# Patient Record
Sex: FEMALE | Race: WHITE | Employment: UNEMPLOYED
[De-identification: names, ages, dates, MRNs, and addresses within clinical notes are randomized per-mention and may not be internally consistent; named-entity substitution may affect disease eponyms.]

---

## 2017-01-24 ENCOUNTER — OFFICE VISIT (OUTPATIENT)
Facility: CLINIC | Age: 62
End: 2017-01-24

## 2017-01-24 VITALS
HEIGHT: 62 IN | SYSTOLIC BLOOD PRESSURE: 130 MMHG | WEIGHT: 250 LBS | BODY MASS INDEX: 46.01 KG/M2 | DIASTOLIC BLOOD PRESSURE: 72 MMHG | TEMPERATURE: 97.8 F

## 2017-01-24 DIAGNOSIS — E11.41 TYPE 2 DIABETES MELLITUS WITH DIABETIC MONONEUROPATHY, UNSPECIFIED LONG TERM INSULIN USE STATUS: Primary | ICD-10-CM

## 2017-01-24 DIAGNOSIS — J02.9 PHARYNGITIS, UNSPECIFIED ETIOLOGY: ICD-10-CM

## 2017-01-24 DIAGNOSIS — Z23 NEED FOR INFLUENZA VACCINATION: ICD-10-CM

## 2017-01-24 DIAGNOSIS — E78.1 HYPERTRIGLYCERIDEMIA: Chronic | ICD-10-CM

## 2017-01-24 DIAGNOSIS — Z23 NEED FOR PNEUMOCOCCAL VACCINATION: ICD-10-CM

## 2017-01-24 LAB — HBA1C MFR BLD: 8.5 %

## 2017-01-24 PROCEDURE — 83036 HEMOGLOBIN GLYCOSYLATED A1C: CPT | Performed by: NURSE PRACTITIONER

## 2017-01-24 PROCEDURE — 90686 IIV4 VACC NO PRSV 0.5 ML IM: CPT | Performed by: NURSE PRACTITIONER

## 2017-01-24 PROCEDURE — 90472 IMMUNIZATION ADMIN EACH ADD: CPT | Performed by: NURSE PRACTITIONER

## 2017-01-24 PROCEDURE — G8427 DOCREV CUR MEDS BY ELIG CLIN: HCPCS | Performed by: NURSE PRACTITIONER

## 2017-01-24 PROCEDURE — 99214 OFFICE O/P EST MOD 30 MIN: CPT | Performed by: NURSE PRACTITIONER

## 2017-01-24 PROCEDURE — 3017F COLORECTAL CA SCREEN DOC REV: CPT | Performed by: NURSE PRACTITIONER

## 2017-01-24 PROCEDURE — 1036F TOBACCO NON-USER: CPT | Performed by: NURSE PRACTITIONER

## 2017-01-24 PROCEDURE — 3045F PR MOST RECENT HEMOGLOBIN A1C LEVEL 7.0-9.0%: CPT | Performed by: NURSE PRACTITIONER

## 2017-01-24 PROCEDURE — G8598 ASA/ANTIPLAT THER USED: HCPCS | Performed by: NURSE PRACTITIONER

## 2017-01-24 PROCEDURE — G8419 CALC BMI OUT NRM PARAM NOF/U: HCPCS | Performed by: NURSE PRACTITIONER

## 2017-01-24 PROCEDURE — G8484 FLU IMMUNIZE NO ADMIN: HCPCS | Performed by: NURSE PRACTITIONER

## 2017-01-24 PROCEDURE — 90732 PPSV23 VACC 2 YRS+ SUBQ/IM: CPT | Performed by: NURSE PRACTITIONER

## 2017-01-24 PROCEDURE — 90471 IMMUNIZATION ADMIN: CPT | Performed by: NURSE PRACTITIONER

## 2017-01-24 PROCEDURE — 3014F SCREEN MAMMO DOC REV: CPT | Performed by: NURSE PRACTITIONER

## 2017-01-24 RX ORDER — AMOXICILLIN 500 MG/1
500 CAPSULE ORAL 3 TIMES DAILY
Qty: 30 CAPSULE | Refills: 0 | Status: SHIPPED | OUTPATIENT
Start: 2017-01-24 | End: 2017-02-03

## 2017-01-24 ASSESSMENT — ENCOUNTER SYMPTOMS
SINUS PRESSURE: 0
SORE THROAT: 1
COUGH: 0
RHINORRHEA: 0

## 2017-02-09 ENCOUNTER — TELEPHONE (OUTPATIENT)
Facility: CLINIC | Age: 62
End: 2017-02-09

## 2017-02-09 RX ORDER — AMOXICILLIN 500 MG/1
500 CAPSULE ORAL 3 TIMES DAILY
Qty: 30 CAPSULE | Refills: 0 | Status: SHIPPED | OUTPATIENT
Start: 2017-02-09 | End: 2017-02-19

## 2017-03-01 ENCOUNTER — OFFICE VISIT (OUTPATIENT)
Facility: CLINIC | Age: 62
End: 2017-03-01

## 2017-03-01 VITALS
DIASTOLIC BLOOD PRESSURE: 78 MMHG | HEIGHT: 62 IN | OXYGEN SATURATION: 96 % | SYSTOLIC BLOOD PRESSURE: 134 MMHG | HEART RATE: 84 BPM | TEMPERATURE: 98 F | WEIGHT: 248 LBS | BODY MASS INDEX: 45.64 KG/M2

## 2017-03-01 DIAGNOSIS — R30.0 DYSURIA: ICD-10-CM

## 2017-03-01 DIAGNOSIS — J06.9 UPPER RESPIRATORY TRACT INFECTION, UNSPECIFIED TYPE: Primary | ICD-10-CM

## 2017-03-01 DIAGNOSIS — N30.00 ACUTE CYSTITIS WITHOUT HEMATURIA: ICD-10-CM

## 2017-03-01 LAB
APPEARANCE FLUID: NORMAL
BILIRUBIN, POC: NORMAL
BLOOD URINE, POC: NORMAL
CLARITY, POC: NORMAL
COLOR, POC: NORMAL
GLUCOSE URINE, POC: >=1000
KETONES, POC: NORMAL
LEUKOCYTE EST, POC: NORMAL
NITRITE, POC: NORMAL
PH, POC: 5
PROTEIN, POC: NORMAL
SPECIFIC GRAVITY, POC: <=1.005
UROBILINOGEN, POC: 0.2

## 2017-03-01 PROCEDURE — 1036F TOBACCO NON-USER: CPT | Performed by: NURSE PRACTITIONER

## 2017-03-01 PROCEDURE — 3014F SCREEN MAMMO DOC REV: CPT | Performed by: NURSE PRACTITIONER

## 2017-03-01 PROCEDURE — G8598 ASA/ANTIPLAT THER USED: HCPCS | Performed by: NURSE PRACTITIONER

## 2017-03-01 PROCEDURE — G8427 DOCREV CUR MEDS BY ELIG CLIN: HCPCS | Performed by: NURSE PRACTITIONER

## 2017-03-01 PROCEDURE — 3017F COLORECTAL CA SCREEN DOC REV: CPT | Performed by: NURSE PRACTITIONER

## 2017-03-01 PROCEDURE — 99214 OFFICE O/P EST MOD 30 MIN: CPT | Performed by: NURSE PRACTITIONER

## 2017-03-01 PROCEDURE — 81003 URINALYSIS AUTO W/O SCOPE: CPT | Performed by: NURSE PRACTITIONER

## 2017-03-01 PROCEDURE — G8417 CALC BMI ABV UP PARAM F/U: HCPCS | Performed by: NURSE PRACTITIONER

## 2017-03-01 PROCEDURE — G8484 FLU IMMUNIZE NO ADMIN: HCPCS | Performed by: NURSE PRACTITIONER

## 2017-03-01 RX ORDER — SULFAMETHOXAZOLE AND TRIMETHOPRIM 800; 160 MG/1; MG/1
1 TABLET ORAL 2 TIMES DAILY
Qty: 20 TABLET | Refills: 0 | Status: SHIPPED | OUTPATIENT
Start: 2017-03-01 | End: 2017-03-11

## 2017-03-01 ASSESSMENT — ENCOUNTER SYMPTOMS
RHINORRHEA: 0
COUGH: 1
SORE THROAT: 1
SINUS PRESSURE: 0

## 2017-04-29 DIAGNOSIS — I48.91 ATRIAL FIBRILLATION, UNSPECIFIED TYPE (HCC): ICD-10-CM

## 2017-05-01 RX ORDER — SOTALOL HYDROCHLORIDE 80 MG/1
TABLET ORAL
Qty: 180 TABLET | Refills: 1 | Status: SHIPPED | OUTPATIENT
Start: 2017-05-01 | End: 2017-05-02 | Stop reason: SDUPTHER

## 2017-11-06 ENCOUNTER — HOSPITAL ENCOUNTER (OUTPATIENT)
Age: 62
Setting detail: SPECIMEN
Discharge: HOME | End: 2017-11-06
Payer: COMMERCIAL

## 2017-11-06 DIAGNOSIS — Z00.00 HEALTHCARE MAINTENANCE: ICD-10-CM

## 2017-11-06 LAB
ALBUMIN SERPL-MCNC: 4.1 G/DL (ref 3.5–5.2)
ALBUMIN/GLOBULIN RATIO: 1.1 (ref 1–2.5)
ALP BLD-CCNC: 70 U/L (ref 35–104)
ALT SERPL-CCNC: 35 U/L (ref 5–33)
ANION GAP SERPL CALCULATED.3IONS-SCNC: 14 MMOL/L (ref 9–17)
ANION GAP SERPL CALCULATED.3IONS-SCNC: 14 MMOL/L (ref 9–17)
AST SERPL-CCNC: 38 U/L
BILIRUB SERPL-MCNC: 0.5 MG/DL (ref 0.3–1.2)
BUN BLDV-MCNC: 13 MG/DL (ref 8–23)
BUN BLDV-MCNC: 13 MG/DL (ref 8–23)
BUN/CREAT BLD: ABNORMAL (ref 9–20)
BUN/CREAT BLD: ABNORMAL (ref 9–20)
CALCIUM SERPL-MCNC: 9.3 MG/DL (ref 8.6–10.4)
CALCIUM SERPL-MCNC: 9.3 MG/DL (ref 8.6–10.4)
CHLORIDE BLD-SCNC: 97 MMOL/L (ref 98–107)
CHLORIDE BLD-SCNC: 97 MMOL/L (ref 98–107)
CHOLESTEROL/HDL RATIO: 4.3
CHOLESTEROL: 171 MG/DL
CO2: 28 MMOL/L (ref 20–31)
CO2: 28 MMOL/L (ref 20–31)
CREAT SERPL-MCNC: 0.64 MG/DL (ref 0.5–0.9)
CREAT SERPL-MCNC: 0.64 MG/DL (ref 0.5–0.9)
GFR AFRICAN AMERICAN: >60 ML/MIN
GFR AFRICAN AMERICAN: >60 ML/MIN
GFR NON-AFRICAN AMERICAN: >60 ML/MIN
GFR NON-AFRICAN AMERICAN: >60 ML/MIN
GFR SERPL CREATININE-BSD FRML MDRD: ABNORMAL ML/MIN/{1.73_M2}
GLUCOSE BLD-MCNC: 96 MG/DL (ref 70–99)
GLUCOSE BLD-MCNC: 96 MG/DL (ref 70–99)
HCT VFR BLD CALC: 54.1 % (ref 36.3–47.1)
HDLC SERPL-MCNC: 40 MG/DL
HEMOGLOBIN: 16.3 G/DL (ref 11.9–15.1)
LDL CHOLESTEROL: 93 MG/DL (ref 0–130)
MCH RBC QN AUTO: 25.9 PG (ref 25.2–33.5)
MCHC RBC AUTO-ENTMCNC: 30.1 G/DL (ref 28.4–34.8)
MCV RBC AUTO: 86 FL (ref 82.6–102.9)
PDW BLD-RTO: 16.7 % (ref 11.8–14.4)
PLATELET # BLD: ABNORMAL K/UL (ref 138–453)
PLATELET, FLUORESCENCE: 141 K/UL (ref 138–453)
PLATELET, IMMATURE FRACTION: 9 % (ref 1.1–10.3)
PMV BLD AUTO: ABNORMAL FL (ref 8.1–13.5)
POTASSIUM SERPL-SCNC: 4.4 MMOL/L (ref 3.7–5.3)
POTASSIUM SERPL-SCNC: 4.4 MMOL/L (ref 3.7–5.3)
RBC # BLD: 6.29 M/UL (ref 3.95–5.11)
SODIUM BLD-SCNC: 139 MMOL/L (ref 135–144)
SODIUM BLD-SCNC: 139 MMOL/L (ref 135–144)
TOTAL PROTEIN: 7.8 G/DL (ref 6.4–8.3)
TRIGL SERPL-MCNC: 192 MG/DL
VLDLC SERPL CALC-MCNC: ABNORMAL MG/DL (ref 1–30)
WBC # BLD: 9.3 K/UL (ref 3.5–11.3)

## 2018-01-05 DIAGNOSIS — J34.89 SINUS PRESSURE: ICD-10-CM

## 2018-01-05 RX ORDER — MOMETASONE FUROATE 50 UG/1
SPRAY, METERED NASAL
Qty: 1 EACH | Refills: 2 | Status: SHIPPED | OUTPATIENT
Start: 2018-01-05 | End: 2018-02-14 | Stop reason: SDUPTHER

## 2018-02-14 ENCOUNTER — HOSPITAL ENCOUNTER (OUTPATIENT)
Age: 63
Setting detail: SPECIMEN
Discharge: HOME | End: 2018-02-14
Payer: COMMERCIAL

## 2018-02-14 DIAGNOSIS — N30.01 ACUTE CYSTITIS WITH HEMATURIA: ICD-10-CM

## 2018-02-14 DIAGNOSIS — Z79.4 TYPE 2 DIABETES MELLITUS WITH DIABETIC MONONEUROPATHY, WITH LONG-TERM CURRENT USE OF INSULIN (HCC): ICD-10-CM

## 2018-02-14 DIAGNOSIS — E11.41 TYPE 2 DIABETES MELLITUS WITH DIABETIC MONONEUROPATHY, WITH LONG-TERM CURRENT USE OF INSULIN (HCC): ICD-10-CM

## 2018-02-14 LAB
CREATININE URINE: 95.8 MG/DL (ref 28–217)
MICROALBUMIN/CREAT 24H UR: 24 MG/L
MICROALBUMIN/CREAT UR-RTO: 25 MCG/MG CREAT

## 2018-02-16 LAB
CULTURE: ABNORMAL
CULTURE: ABNORMAL
Lab: ABNORMAL
ORGANISM: ABNORMAL
SPECIMEN DESCRIPTION: ABNORMAL
STATUS: ABNORMAL

## 2018-03-15 PROBLEM — K62.5 RECTAL BLEEDING: Status: ACTIVE | Noted: 2018-03-15

## 2018-03-17 ENCOUNTER — APPOINTMENT (OUTPATIENT)
Dept: CT IMAGING | Age: 63
End: 2018-03-17
Payer: COMMERCIAL

## 2018-03-17 ENCOUNTER — HOSPITAL ENCOUNTER (EMERGENCY)
Age: 63
Discharge: HOME | End: 2018-03-17
Attending: EMERGENCY MEDICINE
Payer: COMMERCIAL

## 2018-03-17 VITALS
WEIGHT: 247 LBS | RESPIRATION RATE: 18 BRPM | TEMPERATURE: 98.1 F | HEART RATE: 77 BPM | SYSTOLIC BLOOD PRESSURE: 134 MMHG | HEIGHT: 62 IN | BODY MASS INDEX: 45.45 KG/M2 | DIASTOLIC BLOOD PRESSURE: 55 MMHG | OXYGEN SATURATION: 93 %

## 2018-03-17 DIAGNOSIS — R10.11 ABDOMINAL PAIN, RIGHT UPPER QUADRANT: Primary | ICD-10-CM

## 2018-03-17 DIAGNOSIS — N30.00 ACUTE CYSTITIS WITHOUT HEMATURIA: ICD-10-CM

## 2018-03-17 LAB
-: ABNORMAL
ABSOLUTE EOS #: 0.2 K/UL (ref 0–0.4)
ABSOLUTE IMMATURE GRANULOCYTE: ABNORMAL K/UL (ref 0–0.3)
ABSOLUTE LYMPH #: 2.3 K/UL (ref 1–4.8)
ABSOLUTE MONO #: 0.5 K/UL (ref 0.1–1.3)
ALBUMIN SERPL-MCNC: 4.1 G/DL (ref 3.5–5.2)
ALBUMIN/GLOBULIN RATIO: NORMAL (ref 1–2.5)
ALP BLD-CCNC: 70 U/L (ref 35–104)
ALT SERPL-CCNC: 23 U/L (ref 5–33)
AMORPHOUS: ABNORMAL
ANION GAP SERPL CALCULATED.3IONS-SCNC: 10 MMOL/L (ref 9–17)
AST SERPL-CCNC: 19 U/L
BACTERIA: ABNORMAL
BASOPHILS # BLD: 1 % (ref 0–2)
BASOPHILS ABSOLUTE: 0.1 K/UL (ref 0–0.2)
BILIRUB SERPL-MCNC: 0.4 MG/DL (ref 0.3–1.2)
BILIRUBIN DIRECT: 0.12 MG/DL
BILIRUBIN URINE: NEGATIVE
BILIRUBIN, INDIRECT: 0.28 MG/DL (ref 0–1)
BUN BLDV-MCNC: 18 MG/DL (ref 8–23)
BUN/CREAT BLD: ABNORMAL (ref 9–20)
CALCIUM SERPL-MCNC: 9.4 MG/DL (ref 8.6–10.4)
CASTS UA: ABNORMAL /LPF
CHLORIDE BLD-SCNC: 102 MMOL/L (ref 98–107)
CO2: 28 MMOL/L (ref 20–31)
COLOR: YELLOW
COMMENT UA: ABNORMAL
CREAT SERPL-MCNC: 0.63 MG/DL (ref 0.5–0.9)
CRYSTALS, UA: ABNORMAL /HPF
DIFFERENTIAL TYPE: ABNORMAL
EOSINOPHILS RELATIVE PERCENT: 2 % (ref 0–4)
EPITHELIAL CELLS UA: ABNORMAL /HPF
GFR AFRICAN AMERICAN: >60 ML/MIN
GFR NON-AFRICAN AMERICAN: >60 ML/MIN
GFR SERPL CREATININE-BSD FRML MDRD: ABNORMAL ML/MIN/{1.73_M2}
GFR SERPL CREATININE-BSD FRML MDRD: ABNORMAL ML/MIN/{1.73_M2}
GLOBULIN: NORMAL G/DL (ref 1.5–3.8)
GLUCOSE BLD-MCNC: 131 MG/DL (ref 70–99)
GLUCOSE URINE: ABNORMAL
HCT VFR BLD CALC: 47.1 % (ref 36–46)
HEMOGLOBIN: 15.3 G/DL (ref 12–16)
IMMATURE GRANULOCYTES: ABNORMAL %
KETONES, URINE: NEGATIVE
LEUKOCYTE ESTERASE, URINE: ABNORMAL
LIPASE: 28 U/L (ref 13–60)
LYMPHOCYTES # BLD: 28 % (ref 24–44)
MCH RBC QN AUTO: 26.7 PG (ref 26–34)
MCHC RBC AUTO-ENTMCNC: 32.4 G/DL (ref 31–37)
MCV RBC AUTO: 82.2 FL (ref 80–100)
MONOCYTES # BLD: 6 % (ref 1–7)
MUCUS: ABNORMAL
NITRITE, URINE: NEGATIVE
NRBC AUTOMATED: ABNORMAL PER 100 WBC
OTHER OBSERVATIONS UA: ABNORMAL
PDW BLD-RTO: 17.1 % (ref 11.5–14.9)
PH UA: 6 (ref 5–8)
PLATELET # BLD: 144 K/UL (ref 150–450)
PLATELET ESTIMATE: ABNORMAL
PMV BLD AUTO: 9.4 FL (ref 6–12)
POTASSIUM SERPL-SCNC: 4.1 MMOL/L (ref 3.7–5.3)
PROTEIN UA: NEGATIVE
RBC # BLD: 5.73 M/UL (ref 4–5.2)
RBC # BLD: ABNORMAL 10*6/UL
RBC UA: ABNORMAL /HPF
RENAL EPITHELIAL, UA: ABNORMAL /HPF
SEG NEUTROPHILS: 63 % (ref 36–66)
SEGMENTED NEUTROPHILS ABSOLUTE COUNT: 5.4 K/UL (ref 1.3–9.1)
SODIUM BLD-SCNC: 140 MMOL/L (ref 135–144)
SPECIFIC GRAVITY UA: 1.03 (ref 1–1.03)
TOTAL PROTEIN: 7.8 G/DL (ref 6.4–8.3)
TRICHOMONAS: ABNORMAL
TURBIDITY: ABNORMAL
URINE HGB: NEGATIVE
UROBILINOGEN, URINE: NORMAL
WBC # BLD: 8.5 K/UL (ref 3.5–11)
WBC # BLD: ABNORMAL 10*3/UL
WBC UA: ABNORMAL /HPF
YEAST: ABNORMAL

## 2018-03-17 PROCEDURE — 96374 THER/PROPH/DIAG INJ IV PUSH: CPT

## 2018-03-17 PROCEDURE — 81001 URINALYSIS AUTO W/SCOPE: CPT

## 2018-03-17 PROCEDURE — 99284 EMERGENCY DEPT VISIT MOD MDM: CPT

## 2018-03-17 PROCEDURE — 83690 ASSAY OF LIPASE: CPT

## 2018-03-17 PROCEDURE — 74177 CT ABD & PELVIS W/CONTRAST: CPT

## 2018-03-17 PROCEDURE — 87086 URINE CULTURE/COLONY COUNT: CPT

## 2018-03-17 PROCEDURE — 6360000004 HC RX CONTRAST MEDICATION: Performed by: EMERGENCY MEDICINE

## 2018-03-17 PROCEDURE — 6360000002 HC RX W HCPCS: Performed by: EMERGENCY MEDICINE

## 2018-03-17 PROCEDURE — 80076 HEPATIC FUNCTION PANEL: CPT

## 2018-03-17 PROCEDURE — 6370000000 HC RX 637 (ALT 250 FOR IP): Performed by: EMERGENCY MEDICINE

## 2018-03-17 PROCEDURE — 80048 BASIC METABOLIC PNL TOTAL CA: CPT

## 2018-03-17 PROCEDURE — 87077 CULTURE AEROBIC IDENTIFY: CPT

## 2018-03-17 PROCEDURE — 36415 COLL VENOUS BLD VENIPUNCTURE: CPT

## 2018-03-17 PROCEDURE — 85025 COMPLETE CBC W/AUTO DIFF WBC: CPT

## 2018-03-17 PROCEDURE — 87186 SC STD MICRODIL/AGAR DIL: CPT

## 2018-03-17 PROCEDURE — 2580000003 HC RX 258: Performed by: EMERGENCY MEDICINE

## 2018-03-17 RX ORDER — MORPHINE SULFATE 2 MG/ML
4 INJECTION, SOLUTION INTRAMUSCULAR; INTRAVENOUS ONCE
Status: COMPLETED | OUTPATIENT
Start: 2018-03-17 | End: 2018-03-17

## 2018-03-17 RX ORDER — 0.9 % SODIUM CHLORIDE 0.9 %
100 INTRAVENOUS SOLUTION INTRAVENOUS ONCE
Status: COMPLETED | OUTPATIENT
Start: 2018-03-17 | End: 2018-03-17

## 2018-03-17 RX ORDER — SODIUM CHLORIDE 0.9 % (FLUSH) 0.9 %
10 SYRINGE (ML) INJECTION PRN
Status: DISCONTINUED | OUTPATIENT
Start: 2018-03-17 | End: 2018-03-18 | Stop reason: HOSPADM

## 2018-03-17 RX ORDER — CEPHALEXIN 250 MG/1
500 CAPSULE ORAL ONCE
Status: COMPLETED | OUTPATIENT
Start: 2018-03-17 | End: 2018-03-17

## 2018-03-17 RX ORDER — CEPHALEXIN 500 MG/1
500 CAPSULE ORAL 4 TIMES DAILY
Qty: 28 CAPSULE | Refills: 0 | Status: SHIPPED | OUTPATIENT
Start: 2018-03-17 | End: 2018-03-24

## 2018-03-17 RX ADMIN — Medication 10 ML: at 20:49

## 2018-03-17 RX ADMIN — SODIUM CHLORIDE 100 ML: 9 INJECTION, SOLUTION INTRAVENOUS at 20:49

## 2018-03-17 RX ADMIN — CEPHALEXIN 500 MG: 250 CAPSULE ORAL at 22:01

## 2018-03-17 RX ADMIN — IOPAMIDOL 100 ML: 755 INJECTION, SOLUTION INTRAVENOUS at 20:49

## 2018-03-17 RX ADMIN — MORPHINE SULFATE 4 MG: 2 INJECTION, SOLUTION INTRAMUSCULAR; INTRAVENOUS at 19:59

## 2018-03-17 ASSESSMENT — PAIN DESCRIPTION - DESCRIPTORS
DESCRIPTORS: ACHING;CONSTANT
DESCRIPTORS: ACHING

## 2018-03-17 ASSESSMENT — ENCOUNTER SYMPTOMS
BLOOD IN STOOL: 1
EYES NEGATIVE: 1
RESPIRATORY NEGATIVE: 1
ABDOMINAL PAIN: 1
ALLERGIC/IMMUNOLOGIC NEGATIVE: 1

## 2018-03-17 ASSESSMENT — PAIN DESCRIPTION - ORIENTATION: ORIENTATION: RIGHT

## 2018-03-17 ASSESSMENT — PAIN DESCRIPTION - FREQUENCY: FREQUENCY: CONTINUOUS

## 2018-03-17 ASSESSMENT — PAIN SCALES - GENERAL
PAINLEVEL_OUTOF10: 9
PAINLEVEL_OUTOF10: 9
PAINLEVEL_OUTOF10: 7

## 2018-03-17 ASSESSMENT — PAIN DESCRIPTION - PAIN TYPE
TYPE: ACUTE PAIN
TYPE: ACUTE PAIN

## 2018-03-17 ASSESSMENT — PAIN DESCRIPTION - LOCATION
LOCATION: ABDOMEN
LOCATION: ABDOMEN

## 2018-03-17 ASSESSMENT — PAIN DESCRIPTION - PROGRESSION: CLINICAL_PROGRESSION: GRADUALLY IMPROVING

## 2018-03-17 NOTE — ED PROVIDER NOTES
apnea; Tubular adenoma; and Type II or unspecified type diabetes mellitus without mention of complication, not stated as uncontrolled. has a past surgical history that includes Pacemaker insertion; Appendectomy;  section; bladder suspension; Abdominal adhesion surgery; Cholecystectomy; Achilles tendon surgery; Hysterectomy; Upper gastrointestinal endoscopy (7 23 15); ERCP (7-24-15); Cardiac defibrillator placement; Cataract removal with implant (Bilateral); Knee arthroscopy (Left); Upper gastrointestinal endoscopy (7/30/15); skin biopsy; Endoscopy, colon, diagnostic; Colonoscopy; and Colonoscopy (3/16/16). Social History     Social History    Marital status:      Spouse name: N/A    Number of children: N/A    Years of education: N/A     Occupational History    Not on file. Social History Main Topics    Smoking status: Never Smoker    Smokeless tobacco: Never Used    Alcohol use No    Drug use: No    Sexual activity: Not on file     Other Topics Concern    Not on file     Social History Narrative    No narrative on file       Family History   Problem Relation Age of Onset    Cancer Father     Cancer Maternal Uncle     Cancer Paternal Uncle     Cancer Paternal Uncle      lung    Cancer Paternal Uncle      pancreas    Cancer Paternal Uncle      pancreas    Cancer Paternal Uncle      pancreas    Cancer Paternal Uncle     Cancer Maternal Uncle     Cancer Maternal Uncle     Heart Disease Mother     Heart Disease Maternal Grandmother     Heart Disease Maternal Grandfather     Heart Disease Paternal Grandmother        Allergies:  Demerol hcl [meperidine] and Lipitor [atorvastatin]    Home Medications:  Prior to Admission medications    Medication Sig Start Date End Date Taking?  Authorizing Provider   cephALEXin (KEFLEX) 500 MG capsule Take 1 capsule by mouth 4 times daily for 7 days 3/17/18 3/24/18 Yes Rosana Abdullahi MD   Empagliflozin-Linagliptin (GLYXAMBI) 25-5 MG ischemia, inflammatory bowel disease, spontaneous bacterial peritonitis    DIAGNOSTIC RESULTS / EMERGENCY DEPARTMENT COURSE / MDM     LABS:  Results for orders placed or performed during the hospital encounter of 03/17/18   CBC Auto Differential   Result Value Ref Range    WBC 8.5 3.5 - 11.0 k/uL    RBC 5.73 (H) 4.0 - 5.2 m/uL    Hemoglobin 15.3 12.0 - 16.0 g/dL    Hematocrit 47.1 (H) 36 - 46 %    MCV 82.2 80 - 100 fL    MCH 26.7 26 - 34 pg    MCHC 32.4 31 - 37 g/dL    RDW 17.1 (H) 11.5 - 14.9 %    Platelets 244 (L) 697 - 450 k/uL    MPV 9.4 6.0 - 12.0 fL    NRBC Automated NOT REPORTED per 100 WBC    Differential Type NOT REPORTED     Seg Neutrophils 63 36 - 66 %    Lymphocytes 28 24 - 44 %    Monocytes 6 1 - 7 %    Eosinophils % 2 0 - 4 %    Basophils 1 0 - 2 %    Immature Granulocytes NOT REPORTED 0 %    Segs Absolute 5.40 1.3 - 9.1 k/uL    Absolute Lymph # 2.30 1.0 - 4.8 k/uL    Absolute Mono # 0.50 0.1 - 1.3 k/uL    Absolute Eos # 0.20 0.0 - 0.4 k/uL    Basophils # 0.10 0.0 - 0.2 k/uL    Absolute Immature Granulocyte NOT REPORTED 0.00 - 0.30 k/uL    WBC Morphology NOT REPORTED     RBC Morphology NOT REPORTED     Platelet Estimate NOT REPORTED    Basic Metabolic Prof   Result Value Ref Range    Glucose 131 (H) 70 - 99 mg/dL    BUN 18 8 - 23 mg/dL    CREATININE 0.63 0.50 - 0.90 mg/dL    Bun/Cre Ratio NOT REPORTED 9 - 20    Calcium 9.4 8.6 - 10.4 mg/dL    Sodium 140 135 - 144 mmol/L    Potassium 4.1 3.7 - 5.3 mmol/L    Chloride 102 98 - 107 mmol/L    CO2 28 20 - 31 mmol/L    Anion Gap 10 9 - 17 mmol/L    GFR Non-African American >60 >60 mL/min    GFR African American >60 >60 mL/min    GFR Comment          GFR Staging NOT REPORTED    Hepatic Function Panel   Result Value Ref Range    Alb 4.1 3.5 - 5.2 g/dL    Alkaline Phosphatase 70 35 - 104 U/L    ALT 23 5 - 33 U/L    AST 19 <32 U/L    Total Bilirubin 0.40 0.3 - 1.2 mg/dL    Bilirubin, Direct 0.12 <0.31 mg/dL    Bilirubin, Indirect 0.28 0.00 - 1.00 mg/dL    Total NP  [address] [facility]  [address] [facility],[address]  [address] [facility] [address]  [phone]    Call in 1 day      [facility] [place] [facility]  [place] [address]  [phone]  Go to   As needed, If symptoms worsen    MD Donovan [address] [place] [address]  [address] [facility] [address] [phone]    Call   For Follow up appointment      DISCHARGE MEDICATIONS:  Discharge Medication List as of 3/17/2018  9:32 PM      START taking these medications    Details   cephALEXin (KEFLEX) 500 MG capsule Take 1 capsule by mouth 4 times daily for 7 days, Disp-28 capsule, R-0Print             Harjeet Mena MD  Emergency Medicine Resident    (Please note that portions of this note were completed with a voice recognition program.  Efforts were made to edit the dictations but occasionally words are mis-transcribed.)       Harjeet Mena MD  Resident  03/18/18 1517

## 2018-03-19 ENCOUNTER — OFFICE VISIT (OUTPATIENT)
Dept: GASTROENTEROLOGY | Age: 63
End: 2018-03-19
Payer: COMMERCIAL

## 2018-03-19 VITALS
HEART RATE: 81 BPM | SYSTOLIC BLOOD PRESSURE: 138 MMHG | HEIGHT: 62 IN | BODY MASS INDEX: 46.19 KG/M2 | WEIGHT: 251 LBS | OXYGEN SATURATION: 92 % | DIASTOLIC BLOOD PRESSURE: 84 MMHG | RESPIRATION RATE: 14 BRPM

## 2018-03-19 DIAGNOSIS — R10.11 RIGHT UPPER QUADRANT ABDOMINAL PAIN: ICD-10-CM

## 2018-03-19 DIAGNOSIS — D36.9 TUBULAR ADENOMA: ICD-10-CM

## 2018-03-19 DIAGNOSIS — K21.9 GASTROESOPHAGEAL REFLUX DISEASE WITHOUT ESOPHAGITIS: Primary | ICD-10-CM

## 2018-03-19 DIAGNOSIS — K62.5 RECTAL BLEEDING: ICD-10-CM

## 2018-03-19 LAB
CULTURE: ABNORMAL
CULTURE: ABNORMAL
Lab: ABNORMAL
ORGANISM: ABNORMAL
SPECIMEN DESCRIPTION: ABNORMAL
SPECIMEN DESCRIPTION: ABNORMAL
STATUS: ABNORMAL

## 2018-03-19 PROCEDURE — G8427 DOCREV CUR MEDS BY ELIG CLIN: HCPCS | Performed by: INTERNAL MEDICINE

## 2018-03-19 PROCEDURE — 3014F SCREEN MAMMO DOC REV: CPT | Performed by: INTERNAL MEDICINE

## 2018-03-19 PROCEDURE — 3017F COLORECTAL CA SCREEN DOC REV: CPT | Performed by: INTERNAL MEDICINE

## 2018-03-19 PROCEDURE — 1036F TOBACCO NON-USER: CPT | Performed by: INTERNAL MEDICINE

## 2018-03-19 PROCEDURE — G8598 ASA/ANTIPLAT THER USED: HCPCS | Performed by: INTERNAL MEDICINE

## 2018-03-19 PROCEDURE — G8484 FLU IMMUNIZE NO ADMIN: HCPCS | Performed by: INTERNAL MEDICINE

## 2018-03-19 PROCEDURE — 99214 OFFICE O/P EST MOD 30 MIN: CPT | Performed by: INTERNAL MEDICINE

## 2018-03-19 PROCEDURE — G8417 CALC BMI ABV UP PARAM F/U: HCPCS | Performed by: INTERNAL MEDICINE

## 2018-03-19 RX ORDER — SODIUM, POTASSIUM,MAG SULFATES 17.5-3.13G
SOLUTION, RECONSTITUTED, ORAL ORAL
Qty: 1 BOTTLE | Refills: 0 | Status: SHIPPED | OUTPATIENT
Start: 2018-03-19 | End: 2018-05-03

## 2018-03-19 RX ORDER — DICYCLOMINE HYDROCHLORIDE 10 MG/1
10 CAPSULE ORAL 4 TIMES DAILY
Qty: 120 CAPSULE | Refills: 3 | Status: SHIPPED | OUTPATIENT
Start: 2018-03-19 | End: 2018-08-06 | Stop reason: SDUPTHER

## 2018-03-19 ASSESSMENT — ENCOUNTER SYMPTOMS
ABDOMINAL DISTENTION: 1
RECTAL PAIN: 0
ABDOMINAL PAIN: 1
BLOOD IN STOOL: 1
ANAL BLEEDING: 1
DIARRHEA: 0
CONSTIPATION: 0
RESPIRATORY NEGATIVE: 1
VOMITING: 0
EYES NEGATIVE: 1
ALLERGIC/IMMUNOLOGIC NEGATIVE: 1
NAUSEA: 0

## 2018-04-04 ENCOUNTER — HOSPITAL ENCOUNTER (OUTPATIENT)
Age: 63
Setting detail: OUTPATIENT SURGERY
Discharge: HOME | End: 2018-04-04
Attending: INTERNAL MEDICINE | Admitting: INTERNAL MEDICINE
Payer: COMMERCIAL

## 2018-04-04 VITALS
BODY MASS INDEX: 45.45 KG/M2 | HEIGHT: 62 IN | TEMPERATURE: 97.9 F | HEART RATE: 66 BPM | DIASTOLIC BLOOD PRESSURE: 77 MMHG | OXYGEN SATURATION: 99 % | WEIGHT: 247 LBS | RESPIRATION RATE: 16 BRPM | SYSTOLIC BLOOD PRESSURE: 133 MMHG

## 2018-04-04 LAB — GLUCOSE BLD-MCNC: 107 MG/DL (ref 65–105)

## 2018-04-04 PROCEDURE — 88305 TISSUE EXAM BY PATHOLOGIST: CPT

## 2018-04-04 PROCEDURE — 82947 ASSAY GLUCOSE BLOOD QUANT: CPT

## 2018-04-04 PROCEDURE — 45385 COLONOSCOPY W/LESION REMOVAL: CPT | Performed by: INTERNAL MEDICINE

## 2018-04-04 PROCEDURE — 99153 MOD SED SAME PHYS/QHP EA: CPT | Performed by: INTERNAL MEDICINE

## 2018-04-04 PROCEDURE — 3609010600 HC COLONOSCOPY POLYPECTOMY SNARE/COLD BIOPSY: Performed by: INTERNAL MEDICINE

## 2018-04-04 PROCEDURE — 7100000010 HC PHASE II RECOVERY - FIRST 15 MIN: Performed by: INTERNAL MEDICINE

## 2018-04-04 PROCEDURE — 99152 MOD SED SAME PHYS/QHP 5/>YRS: CPT | Performed by: INTERNAL MEDICINE

## 2018-04-04 PROCEDURE — 2580000003 HC RX 258: Performed by: INTERNAL MEDICINE

## 2018-04-04 PROCEDURE — 6360000002 HC RX W HCPCS: Performed by: INTERNAL MEDICINE

## 2018-04-04 PROCEDURE — C1773 RET DEV, INSERTABLE: HCPCS | Performed by: INTERNAL MEDICINE

## 2018-04-04 PROCEDURE — 7100000011 HC PHASE II RECOVERY - ADDTL 15 MIN: Performed by: INTERNAL MEDICINE

## 2018-04-04 RX ORDER — FENTANYL CITRATE 50 UG/ML
INJECTION, SOLUTION INTRAMUSCULAR; INTRAVENOUS PRN
Status: DISCONTINUED | OUTPATIENT
Start: 2018-04-04 | End: 2018-04-04 | Stop reason: HOSPADM

## 2018-04-04 RX ORDER — MIDAZOLAM HYDROCHLORIDE 1 MG/ML
INJECTION INTRAMUSCULAR; INTRAVENOUS PRN
Status: DISCONTINUED | OUTPATIENT
Start: 2018-04-04 | End: 2018-04-04 | Stop reason: HOSPADM

## 2018-04-04 RX ORDER — SODIUM CHLORIDE, SODIUM LACTATE, POTASSIUM CHLORIDE, CALCIUM CHLORIDE 600; 310; 30; 20 MG/100ML; MG/100ML; MG/100ML; MG/100ML
INJECTION, SOLUTION INTRAVENOUS CONTINUOUS
Status: DISCONTINUED | OUTPATIENT
Start: 2018-04-04 | End: 2018-04-04 | Stop reason: HOSPADM

## 2018-04-04 RX ADMIN — SODIUM CHLORIDE, POTASSIUM CHLORIDE, SODIUM LACTATE AND CALCIUM CHLORIDE: 600; 310; 30; 20 INJECTION, SOLUTION INTRAVENOUS at 09:14

## 2018-04-04 ASSESSMENT — PAIN - FUNCTIONAL ASSESSMENT: PAIN_FUNCTIONAL_ASSESSMENT: 0-10

## 2018-04-04 ASSESSMENT — PAIN SCALES - GENERAL: PAINLEVEL_OUTOF10: 0

## 2018-04-05 LAB — SURGICAL PATHOLOGY REPORT: NORMAL

## 2018-05-03 ENCOUNTER — OFFICE VISIT (OUTPATIENT)
Dept: GASTROENTEROLOGY | Age: 63
End: 2018-05-03
Payer: COMMERCIAL

## 2018-05-03 VITALS
WEIGHT: 254 LBS | SYSTOLIC BLOOD PRESSURE: 135 MMHG | BODY MASS INDEX: 46.74 KG/M2 | HEART RATE: 78 BPM | HEIGHT: 62 IN | OXYGEN SATURATION: 95 % | RESPIRATION RATE: 14 BRPM | DIASTOLIC BLOOD PRESSURE: 78 MMHG

## 2018-05-03 DIAGNOSIS — D36.9 TUBULAR ADENOMA: Primary | ICD-10-CM

## 2018-05-03 DIAGNOSIS — K21.9 GASTROESOPHAGEAL REFLUX DISEASE WITHOUT ESOPHAGITIS: ICD-10-CM

## 2018-05-03 PROCEDURE — 1036F TOBACCO NON-USER: CPT | Performed by: INTERNAL MEDICINE

## 2018-05-03 PROCEDURE — 3017F COLORECTAL CA SCREEN DOC REV: CPT | Performed by: INTERNAL MEDICINE

## 2018-05-03 PROCEDURE — 99214 OFFICE O/P EST MOD 30 MIN: CPT | Performed by: INTERNAL MEDICINE

## 2018-05-03 PROCEDURE — G8598 ASA/ANTIPLAT THER USED: HCPCS | Performed by: INTERNAL MEDICINE

## 2018-05-03 PROCEDURE — G8417 CALC BMI ABV UP PARAM F/U: HCPCS | Performed by: INTERNAL MEDICINE

## 2018-05-03 PROCEDURE — G8427 DOCREV CUR MEDS BY ELIG CLIN: HCPCS | Performed by: INTERNAL MEDICINE

## 2018-05-03 ASSESSMENT — ENCOUNTER SYMPTOMS
EYES NEGATIVE: 1
ANAL BLEEDING: 0
ABDOMINAL PAIN: 0
RESPIRATORY NEGATIVE: 1
VOMITING: 0
RECTAL PAIN: 0
BLOOD IN STOOL: 0
GASTROINTESTINAL NEGATIVE: 1
NAUSEA: 0
DIARRHEA: 0
CONSTIPATION: 0
ALLERGIC/IMMUNOLOGIC NEGATIVE: 1
ABDOMINAL DISTENTION: 0

## 2018-05-30 DIAGNOSIS — J34.89 SINUS PRESSURE: ICD-10-CM

## 2018-05-30 RX ORDER — MOMETASONE FUROATE 50 UG/1
SPRAY, METERED NASAL
Qty: 1 INHALER | Refills: 2 | Status: SHIPPED | OUTPATIENT
Start: 2018-05-30 | End: 2018-08-29 | Stop reason: SDUPTHER

## 2018-05-31 ENCOUNTER — HOSPITAL ENCOUNTER (OUTPATIENT)
Facility: CLINIC | Age: 63
Discharge: HOME | End: 2018-06-02
Payer: COMMERCIAL

## 2018-05-31 ENCOUNTER — HOSPITAL ENCOUNTER (OUTPATIENT)
Dept: GENERAL RADIOLOGY | Facility: CLINIC | Age: 63
Discharge: HOME | End: 2018-06-02
Payer: COMMERCIAL

## 2018-05-31 DIAGNOSIS — R05.9 COUGH: ICD-10-CM

## 2018-05-31 PROCEDURE — 71046 X-RAY EXAM CHEST 2 VIEWS: CPT

## 2018-08-08 RX ORDER — DICYCLOMINE HYDROCHLORIDE 10 MG/1
10 CAPSULE ORAL 4 TIMES DAILY
Qty: 120 CAPSULE | Refills: 0 | Status: SHIPPED | OUTPATIENT
Start: 2018-08-08 | End: 2018-11-29

## 2018-11-29 ENCOUNTER — HOSPITAL ENCOUNTER (OUTPATIENT)
Age: 63
Setting detail: SPECIMEN
Discharge: HOME | End: 2018-11-29
Payer: COMMERCIAL

## 2018-11-29 ENCOUNTER — HOSPITAL ENCOUNTER (OUTPATIENT)
Facility: CLINIC | Age: 63
Discharge: HOME | End: 2018-12-01
Payer: COMMERCIAL

## 2018-11-29 ENCOUNTER — HOSPITAL ENCOUNTER (OUTPATIENT)
Dept: GENERAL RADIOLOGY | Facility: CLINIC | Age: 63
Discharge: HOME | End: 2018-12-01
Payer: COMMERCIAL

## 2018-11-29 DIAGNOSIS — M79.601 RIGHT ARM PAIN: ICD-10-CM

## 2018-11-29 DIAGNOSIS — R10.11 RUQ PAIN: ICD-10-CM

## 2018-11-29 DIAGNOSIS — R20.2 NUMBNESS AND TINGLING OF RIGHT ARM: ICD-10-CM

## 2018-11-29 DIAGNOSIS — R20.0 NUMBNESS AND TINGLING OF RIGHT ARM: ICD-10-CM

## 2018-11-29 LAB
AMYLASE: 41 U/L (ref 28–100)
LIPASE: 30 U/L (ref 13–60)

## 2018-11-29 PROCEDURE — 73080 X-RAY EXAM OF ELBOW: CPT

## 2018-11-29 PROCEDURE — 72040 X-RAY EXAM NECK SPINE 2-3 VW: CPT

## 2018-12-10 ENCOUNTER — HOSPITAL ENCOUNTER (OUTPATIENT)
Age: 63
Setting detail: SPECIMEN
Discharge: HOME | End: 2018-12-10
Payer: COMMERCIAL

## 2018-12-10 DIAGNOSIS — E78.1 HYPERTRIGLYCERIDEMIA: Chronic | ICD-10-CM

## 2018-12-10 LAB
ALBUMIN SERPL-MCNC: 4 G/DL (ref 3.5–5.2)
ALBUMIN/GLOBULIN RATIO: 1.3 (ref 1–2.5)
ALP BLD-CCNC: 71 U/L (ref 35–104)
ALT SERPL-CCNC: 20 U/L (ref 5–33)
ANION GAP SERPL CALCULATED.3IONS-SCNC: 12 MMOL/L (ref 9–17)
AST SERPL-CCNC: 19 U/L
BILIRUB SERPL-MCNC: 0.36 MG/DL (ref 0.3–1.2)
BUN BLDV-MCNC: 21 MG/DL (ref 8–23)
BUN/CREAT BLD: NORMAL (ref 9–20)
CALCIUM SERPL-MCNC: 9.5 MG/DL (ref 8.6–10.4)
CHLORIDE BLD-SCNC: 100 MMOL/L (ref 98–107)
CHOLESTEROL/HDL RATIO: 3
CHOLESTEROL: 170 MG/DL
CO2: 31 MMOL/L (ref 20–31)
CREAT SERPL-MCNC: 0.62 MG/DL (ref 0.5–0.9)
GFR AFRICAN AMERICAN: >60 ML/MIN
GFR NON-AFRICAN AMERICAN: >60 ML/MIN
GFR SERPL CREATININE-BSD FRML MDRD: NORMAL ML/MIN/{1.73_M2}
GFR SERPL CREATININE-BSD FRML MDRD: NORMAL ML/MIN/{1.73_M2}
GLUCOSE BLD-MCNC: 93 MG/DL (ref 70–99)
HCT VFR BLD CALC: 49.8 % (ref 36.3–47.1)
HDLC SERPL-MCNC: 56 MG/DL
HEMOGLOBIN: 14.8 G/DL (ref 11.9–15.1)
LDL CHOLESTEROL: 73 MG/DL (ref 0–130)
MCH RBC QN AUTO: 26.9 PG (ref 25.2–33.5)
MCHC RBC AUTO-ENTMCNC: 29.7 G/DL (ref 28.4–34.8)
MCV RBC AUTO: 90.4 FL (ref 82.6–102.9)
NRBC AUTOMATED: 0 PER 100 WBC
PDW BLD-RTO: 16.1 % (ref 11.8–14.4)
PLATELET # BLD: 170 K/UL (ref 138–453)
PMV BLD AUTO: 12.4 FL (ref 8.1–13.5)
POTASSIUM SERPL-SCNC: 5 MMOL/L (ref 3.7–5.3)
RBC # BLD: 5.51 M/UL (ref 3.95–5.11)
SODIUM BLD-SCNC: 143 MMOL/L (ref 135–144)
TOTAL PROTEIN: 7.2 G/DL (ref 6.4–8.3)
TRIGL SERPL-MCNC: 203 MG/DL
VLDLC SERPL CALC-MCNC: ABNORMAL MG/DL (ref 1–30)
WBC # BLD: 8.8 K/UL (ref 3.5–11.3)

## 2018-12-14 ENCOUNTER — CLINICAL DOCUMENTATION (OUTPATIENT)
Dept: PHYSICAL MEDICINE AND REHAB | Age: 63
End: 2018-12-14

## 2019-01-29 ENCOUNTER — HOSPITAL ENCOUNTER (OUTPATIENT)
Dept: NEUROLOGY | Age: 64
Discharge: HOME | End: 2019-01-29
Payer: COMMERCIAL

## 2019-01-29 PROCEDURE — 95908 NRV CNDJ TST 3-4 STUDIES: CPT | Performed by: PHYSICAL MEDICINE & REHABILITATION

## 2019-01-29 PROCEDURE — 95886 MUSC TEST DONE W/N TEST COMP: CPT | Performed by: PHYSICAL MEDICINE & REHABILITATION

## 2019-01-30 DIAGNOSIS — R20.2 NUMBNESS AND TINGLING OF RIGHT ARM: ICD-10-CM

## 2019-01-30 DIAGNOSIS — M79.601 RIGHT ARM PAIN: ICD-10-CM

## 2019-01-30 DIAGNOSIS — R20.0 NUMBNESS AND TINGLING OF RIGHT ARM: ICD-10-CM

## 2019-04-11 ENCOUNTER — OFFICE VISIT (OUTPATIENT)
Dept: ORTHOPEDIC SURGERY | Age: 64
End: 2019-04-11
Payer: COMMERCIAL

## 2019-04-11 VITALS — HEIGHT: 63 IN | BODY MASS INDEX: 45.36 KG/M2 | RESPIRATION RATE: 16 BRPM | OXYGEN SATURATION: 100 %

## 2019-04-11 DIAGNOSIS — G56.01 RIGHT CARPAL TUNNEL SYNDROME: Primary | ICD-10-CM

## 2019-04-11 PROCEDURE — G8598 ASA/ANTIPLAT THER USED: HCPCS | Performed by: ORTHOPAEDIC SURGERY

## 2019-04-11 PROCEDURE — 1036F TOBACCO NON-USER: CPT | Performed by: ORTHOPAEDIC SURGERY

## 2019-04-11 PROCEDURE — G8417 CALC BMI ABV UP PARAM F/U: HCPCS | Performed by: ORTHOPAEDIC SURGERY

## 2019-04-11 PROCEDURE — 99203 OFFICE O/P NEW LOW 30 MIN: CPT | Performed by: ORTHOPAEDIC SURGERY

## 2019-04-11 PROCEDURE — G8427 DOCREV CUR MEDS BY ELIG CLIN: HCPCS | Performed by: ORTHOPAEDIC SURGERY

## 2019-04-11 PROCEDURE — 3017F COLORECTAL CA SCREEN DOC REV: CPT | Performed by: ORTHOPAEDIC SURGERY

## 2019-04-11 NOTE — PROGRESS NOTES
Mariya Hudson M.D.            [address] [place] [address], [address] [facility]               Liana Luna             Dept Phone: [phone]             Dept Fax:  [phone] is a 59-year-old right-hand out of patient seen for right carpal tunnel symptoms. Patient states she's had symptoms for years. She has tried nocturnal splints to no avail. She did have an EMG and NCV which shows that she has severe carpal tunnel syndrome on the right. Examination patient right wrist notes no thenar atrophy. She has ongoing Phalen's. She has a positive Tinel's. She has slightly decreased  strength. Interosseous strength is good no Froment's no other contributory findings    The an EMG and NCV revealed severe carpal tunnel syndrome on the right    Impression  Severe carpal tunnel syndrome right    Plan  Patient has failed nocturnal splints. She wishes to proceed surgically. We discussed the risks and benefits. We'll see her back here postop        XR taken today:  No results found. Review of Systems   Constitutional: Negative. HENT: Negative. Respiratory: Negative. Cardiovascular: Negative. Neurological: Negative.     Musculoskeletal:   Carpal Tunnel          Current Outpatient Medications:     TRESIBA FLEXTOUCH 200 UNIT/ML SOPN, INJECT 65 UNITS INTO THE SKIN DAILY, Disp: 27 mL, Rfl: 3    SITagliptin (JANUVIA) 100 MG tablet, Take 1 tablet by mouth daily, Disp: 90 tablet, Rfl: 1    dapagliflozin (FARXIGA) 10 MG tablet, Take 1 tablet by mouth every morning, Disp: 90 tablet, Rfl: 1    omega-3 acid ethyl esters (LOVAZA) 1 g capsule, Take 2 capsules by mouth 2 times daily, Disp: 360 capsule, Rfl: 1    albuterol (PROVENTIL) (2.5 MG/3ML) 0.083% nebulizer solution, Take 3 mLs by nebulization every 6 hours as needed for Wheezing, Disp: 120 each, Rfl: 3    albuterol sulfate HFA (PROAIR HFA) 108 (90 Base) MCG/ACT inhaler, Inhale 2 puffs into the lungs every 6 hours as needed for Wheezing, Disp: 1 Inhaler, Rfl: 3    sotalol (BETAPACE) 80 MG tablet, TAKE 1 TABLET BY MOUTH 2 TIMES DAILY, Disp: 180 tablet, Rfl: 1    pantoprazole (PROTONIX) 40 MG tablet, Take 1 tablet by mouth daily, Disp: 90 tablet, Rfl: 1    amLODIPine (NORVASC) 5 MG tablet, TAKE 1 TABLET BY MOUTH EVERY DAY, Disp: 90 tablet, Rfl: 1    mometasone (NASONEX) 50 MCG/ACT nasal spray, SPRAY TWO SPRAYS IN EACH NOSTRIL ONCE DAILY, Disp: 1 Inhaler, Rfl: 2    pioglitazone (ACTOS) 30 MG tablet, Take 1 tablet by mouth daily, Disp: 90 tablet, Rfl: 1    budesonide-formoterol (SYMBICORT) 160-4.5 MCG/ACT AERO, TAKE 2 PUFFS BY MOUTH TWICE A DAY, Disp: 10.2 Inhaler, Rfl: 5    Semaglutide (OZEMPIC) 0.25 or 0.5 MG/DOSE SOPN, Inject 0.5 mg into the skin once a week, Disp: 2 pen, Rfl: 5    valsartan (DIOVAN) 320 MG tablet, TAKE 1 TABLET BY MOUTH DAILY, Disp: 90 tablet, Rfl: 1    predniSONE (DELTASONE) 10 MG tablet, 3 tabs for 3 days, 2 tabs for 3 days, 1 tab for 3 days, Disp: 18 tablet, Rfl: 0    Insulin Pen Needle 32G X 4 MM MISC, 1 each by Does not apply route daily, Disp: 90 each, Rfl: 3    aspirin 325 MG tablet, Take 325 mg by mouth every other day DUe 12/12/15, Disp: , Rfl:     Allergies   Allergen Reactions    Demerol Hcl [Meperidine]     Lipitor [Atorvastatin]      General aching  General aching    Metformin And Related        Social History     Socioeconomic History    Marital status:      Spouse name: Not on file    Number of children: Not on file    Years of education: Not on file    Highest education level: Not on file   Occupational History    Not on file   Social Needs    Financial resource strain: Not on file    Food insecurity:     Worry: Not on file     Inability: Not on file    Transportation needs:     Medical: Not on file     Non-medical: Not on file   Tobacco Use    Smoking status: Never Smoker    Smokeless tobacco: Never Used   Substance and Sexual Activity    Alcohol use: No    Drug use: No    Sexual activity: Not on file   Lifestyle    Physical activity:     Days per week: Not on file     Minutes per session: Not on file    Stress: Not on file   Relationships    Social connections:     Talks on phone: Not on file     Gets together: Not on file     Attends Adventism service: Not on file     Active member of club or organization: Not on file     Attends meetings of clubs or organizations: Not on file     Relationship status: Not on file    Intimate partner violence:     Fear of current or ex partner: Not on file     Emotionally abused: Not on file     Physically abused: Not on file     Forced sexual activity: Not on file   Other Topics Concern    Not on file   Social History Narrative    Not on file       Past Medical History:   Diagnosis Date    Asthma     Atrial fibrillation ([facility]     had albation    CAD (coronary artery disease)     Cancer ([facility]     skin    GERD (gastroesophageal reflux disease)     Hypertension     Morbid obesity ([facility]     Sleep apnea     uses cpap    Tubular adenoma     Type II or unspecified type diabetes mellitus without mention of complication, not stated as uncontrolled      Past Surgical History:   Procedure Laterality Date    ABDOMINAL ADHESION SURGERY      ACHILLES TENDON SURGERY      APPENDECTOMY      BLADDER SUSPENSION      CARDIAC DEFIBRILLATOR PLACEMENT      CARDIAC DEFIBRILLATOR PLACEMENT      CARDIAC DEFIBRILLATOR PLACEMENT      CATARACT REMOVAL WITH IMPLANT Bilateral      SECTION      CHOLECYSTECTOMY      COLONOSCOPY      x3    COLONOSCOPY  3/16/16    Poor prep , not optimal for patient with polyps, repeat colonoscopy in 1- 2 years , tubular adenoma     COLONOSCOPY N/A 2018    COLONOSCOPY POLYPECTOMY SNARE performed by López Lee MD at [address] [place] [address], COLON, DIAGNOSTIC      ERCP  7-24-15    biliary sphincterotomy, pacreatic duct stent placement    HYSTERECTOMY      KNEE ARTHROSCOPY Left     PACEMAKER INSERTION      AICD    SKIN BIOPSY      nose    UPPER GASTROINTESTINAL ENDOSCOPY  7 23 15    bx    UPPER GASTROINTESTINAL ENDOSCOPY  7/30/15     Family History   Problem Relation Age of Onset    Cancer Father     Cancer Maternal Uncle     Cancer Paternal Uncle         colon    Cancer Paternal Uncle         lung    Cancer Paternal Uncle         pancreas    Cancer Paternal Uncle         pancreas    Cancer Paternal Uncle         pancreas    Cancer Paternal Uncle     Cancer Maternal Uncle     Cancer Maternal Uncle     Heart Disease Mother     Heart Disease Maternal Grandmother     Heart Disease Maternal Grandfather     Heart Disease Paternal Grandmother          No orders of the defined types were placed in this encounter. 1. Right carpal tunnel syndrome            Sherri Guerra MD    Please note that this chart was generated using voice recognition Dragon dictation software. Although every effort was made to ensure the accuracy of this automated transcription, some errors in transcription may have occurred.

## 2019-04-18 ENCOUNTER — HOSPITAL ENCOUNTER (OUTPATIENT)
Dept: PREADMISSION TESTING | Age: 64
Discharge: HOME | End: 2019-04-22
Payer: COMMERCIAL

## 2019-04-18 VITALS
TEMPERATURE: 98.1 F | HEIGHT: 63 IN | HEART RATE: 69 BPM | OXYGEN SATURATION: 99 % | DIASTOLIC BLOOD PRESSURE: 73 MMHG | RESPIRATION RATE: 16 BRPM | SYSTOLIC BLOOD PRESSURE: 158 MMHG | WEIGHT: 270 LBS | BODY MASS INDEX: 47.84 KG/M2

## 2019-04-18 LAB
ABSOLUTE EOS #: 0.1 K/UL (ref 0–0.4)
ABSOLUTE IMMATURE GRANULOCYTE: ABNORMAL K/UL (ref 0–0.3)
ABSOLUTE LYMPH #: 1.8 K/UL (ref 1–4.8)
ABSOLUTE MONO #: 0.3 K/UL (ref 0.1–1.3)
ANION GAP SERPL CALCULATED.3IONS-SCNC: 10 MMOL/L (ref 9–17)
BASOPHILS # BLD: 1 % (ref 0–2)
BASOPHILS ABSOLUTE: 0 K/UL (ref 0–0.2)
BUN BLDV-MCNC: 16 MG/DL (ref 8–23)
BUN/CREAT BLD: ABNORMAL (ref 9–20)
CALCIUM SERPL-MCNC: 9.1 MG/DL (ref 8.6–10.4)
CHLORIDE BLD-SCNC: 102 MMOL/L (ref 98–107)
CO2: 29 MMOL/L (ref 20–31)
CREAT SERPL-MCNC: 0.91 MG/DL (ref 0.5–0.9)
DIFFERENTIAL TYPE: ABNORMAL
EOSINOPHILS RELATIVE PERCENT: 3 % (ref 0–4)
GFR AFRICAN AMERICAN: >60 ML/MIN
GFR NON-AFRICAN AMERICAN: >60 ML/MIN
GFR SERPL CREATININE-BSD FRML MDRD: ABNORMAL ML/MIN/{1.73_M2}
GFR SERPL CREATININE-BSD FRML MDRD: ABNORMAL ML/MIN/{1.73_M2}
GLUCOSE BLD-MCNC: 123 MG/DL (ref 70–99)
HCT VFR BLD CALC: 43.9 % (ref 36–46)
HEMOGLOBIN: 14.2 G/DL (ref 12–16)
IMMATURE GRANULOCYTES: ABNORMAL %
LYMPHOCYTES # BLD: 35 % (ref 24–44)
MCH RBC QN AUTO: 27.3 PG (ref 26–34)
MCHC RBC AUTO-ENTMCNC: 32.3 G/DL (ref 31–37)
MCV RBC AUTO: 84.5 FL (ref 80–100)
MONOCYTES # BLD: 6 % (ref 1–7)
NRBC AUTOMATED: ABNORMAL PER 100 WBC
PDW BLD-RTO: 16.4 % (ref 11.5–14.9)
PLATELET # BLD: 154 K/UL (ref 150–450)
PLATELET ESTIMATE: ABNORMAL
PMV BLD AUTO: 9.9 FL (ref 6–12)
POTASSIUM SERPL-SCNC: 4.3 MMOL/L (ref 3.7–5.3)
RBC # BLD: 5.2 M/UL (ref 4–5.2)
RBC # BLD: ABNORMAL 10*6/UL
SEG NEUTROPHILS: 55 % (ref 36–66)
SEGMENTED NEUTROPHILS ABSOLUTE COUNT: 3 K/UL (ref 1.3–9.1)
SODIUM BLD-SCNC: 141 MMOL/L (ref 135–144)
WBC # BLD: 5.3 K/UL (ref 3.5–11)
WBC # BLD: ABNORMAL 10*3/UL

## 2019-04-18 PROCEDURE — 80048 BASIC METABOLIC PNL TOTAL CA: CPT

## 2019-04-18 PROCEDURE — 93005 ELECTROCARDIOGRAM TRACING: CPT

## 2019-04-18 PROCEDURE — 85025 COMPLETE CBC W/AUTO DIFF WBC: CPT

## 2019-04-18 PROCEDURE — 36415 COLL VENOUS BLD VENIPUNCTURE: CPT

## 2019-04-18 ASSESSMENT — PAIN SCALES - GENERAL: PAINLEVEL_OUTOF10: 7

## 2019-04-18 ASSESSMENT — PAIN DESCRIPTION - ORIENTATION: ORIENTATION: RIGHT

## 2019-04-18 ASSESSMENT — PAIN DESCRIPTION - LOCATION: LOCATION: ARM;WRIST

## 2019-04-18 ASSESSMENT — PAIN DESCRIPTION - PAIN TYPE: TYPE: CHRONIC PAIN

## 2019-04-18 NOTE — H&P (VIEW-ONLY)
COLONOSCOPY      x3    COLONOSCOPY  3/16/16    Poor prep , not optimal for patient with polyps, repeat colonoscopy in 1- 2 years , tubular adenoma     COLONOSCOPY N/A 4/4/2018    COLONOSCOPY POLYPECTOMY SNARE performed by Brigid Giordano MD at [address] [place] [address] ERCP  07/24/2015    biliary sphincterotomy, pacreatic duct stent placement, it has been removed    EYE SURGERY Bilateral     cataract    HYSTERECTOMY      KNEE ARTHROSCOPY Left     PACEMAKER PLACEMENT  2012    AICD, had ablation first    SKIN BIOPSY      nose    UPPER GASTROINTESTINAL ENDOSCOPY  7 23 15    bx       FAMILY HISTORY       Family History   Problem Relation Age of Onset    Cancer Father     Cancer Maternal Uncle     Cancer Paternal Uncle         colon    Cancer Paternal Uncle         lung    Cancer Paternal Uncle         pancreas    Cancer Paternal Uncle         pancreas    Cancer Paternal Uncle         pancreas    Cancer Paternal Uncle     Cancer Maternal Uncle     Cancer Maternal Uncle     Heart Disease Mother     Heart Disease Maternal Grandmother     Heart Disease Maternal Grandfather     Heart Disease Paternal Grandmother        SOCIAL HISTORY       Social History     Socioeconomic History    Marital status:      Spouse name: None    Number of children: None    Years of education: None    Highest education level: None   Occupational History    None   Social Needs    Financial resource strain: None    Food insecurity:     Worry: None     Inability: None    Transportation needs:     Medical: None     Non-medical: None   Tobacco Use    Smoking status: Never Smoker    Smokeless tobacco: Never Used   Substance and Sexual Activity    Alcohol use: No    Drug use: No    Sexual activity: None   Lifestyle    Physical activity:     Days per week: None     Minutes per session: None    Stress: None   Relationships    Social connections:     Talks on phone: None     Gets together: None     Attends Gnosticism service: None Active member of club or organization: None     Attends meetings of clubs or organizations: None     Relationship status: None    Intimate partner violence:     Fear of current or ex partner: None     Emotionally abused: None     Physically abused: None     Forced sexual activity: None   Other Topics Concern    None   Social History Narrative    None           REVIEW OF SYSTEMS      Allergies   Allergen Reactions    Demerol Hcl [Meperidine]     Lipitor [Atorvastatin]      General aching  General aching    Metformin And Related        Current Outpatient Medications on File Prior to Encounter   Medication Sig Dispense Refill    TRESIBA FLEXTOUCH 200 UNIT/ML SOPN INJECT 65 UNITS INTO THE SKIN DAILY 27 mL 3    albuterol (PROVENTIL) (2.5 MG/3ML) 0.083% nebulizer solution Take 3 mLs by nebulization every 6 hours as needed for Wheezing 120 each 3    albuterol sulfate HFA (PROAIR HFA) 108 (90 Base) MCG/ACT inhaler Inhale 2 puffs into the lungs every 6 hours as needed for Wheezing 1 Inhaler 3    sotalol (BETAPACE) 80 MG tablet TAKE 1 TABLET BY MOUTH 2 TIMES DAILY 180 tablet 1    pantoprazole (PROTONIX) 40 MG tablet Take 1 tablet by mouth daily 90 tablet 1    amLODIPine (NORVASC) 5 MG tablet TAKE 1 TABLET BY MOUTH EVERY DAY 90 tablet 1    pioglitazone (ACTOS) 30 MG tablet Take 1 tablet by mouth daily 90 tablet 1    budesonide-formoterol (SYMBICORT) 160-4.5 MCG/ACT AERO TAKE 2 PUFFS BY MOUTH TWICE A DAY 10.2 Inhaler 5    Semaglutide (OZEMPIC) 0.25 or 0.5 MG/DOSE SOPN Inject 0.5 mg into the skin once a week 2 pen 5    valsartan (DIOVAN) 320 MG tablet TAKE 1 TABLET BY MOUTH DAILY 90 tablet 1    aspirin 325 MG tablet Take 325 mg by mouth every other day DUe 12/12/15      Insulin Pen Needle 32G X 4 MM MISC 1 each by Does not apply route daily 90 each 3     No current facility-administered medications on file prior to encounter. General health:  Fairly good. No fever or chills.                  Skin: No itching, redness or rash. HEENT:  No headache, epistaxis or sore throat. Neck:  No pain, stiffness or masses. Cardiovascular/Respiratory system:  Dyspnea on exertion, hx asthma. No chest pain, palpitation. Gastrointestinal tract: No abdominal pain, nausea, vomiting, diarrhea or constipation. Genitourinary:  No burning on micturition. No hesitancy, urgency, frequency or discoloration of urine. Locomotor:  See HPI. Neuropsychiatric:  No referable complaints. GENERAL PHYSICAL EXAM:     Vitals: BP (!) 158/73   Pulse 69   Temp 98.1 °F (36.7 °C) (Oral)   Resp 16   Ht 5' 2.5\" (1.588 m)   Wt 270 lb (122.5 kg)   SpO2 99%   BMI 48.60 kg/m²  Body mass index is 48.6 kg/m². GENERAL APPEARANCE:   Levar Lazo is 58 y.o.,  female, severely obese, nourished, conscious, alert. Does not appear to be distress or pain at this time. SKIN:  Warm, dry, no cyanosis or jaundice. HEAD:  Normocephalic, atraumatic, no swelling or tenderness. EYES:  Pupils equal, reactive to light. EARS:  No discharge, no marked hearing loss. NOSE:  No rhinorrhea, epistaxis or septal deformity. THROAT:  Not congested. No ulceration bleeding or discharge. NECK:  No stiffness, trachea central.                 CHEST:  AICD to chest. Symmetrical and equal on expansion. HEART:  Hx atrial fib, currently RRR S1 > S2. No audible murmurs or gallops. LUNGS:  Equal on expansion, normal breath sounds. No adventitious sounds. ABDOMEN:  Obese. Soft on palpation. No localized tenderness. No guarding or rigidity. LYMPHATICS:  No palpable cervical lymphadenopathy. LOCOMOTOR, BACK AND SPINE:  No tenderness or deformities.                  EXTREMITIES: +Tinel's on right, + Phalen's on right.  strength weaker on right as compared to left. Symmetrical, no pedal edema. No calf tenderness. No discoloration or ulcerations. NEUROLOGIC:  The patient is conscious, alert, oriented, No apparent focal sensory or motor deficits.                                                                                    PROVISIONAL DIAGNOSES / SURGERY:      Right Carpal Tunnel Syndrome  Right Carpal Tunnel Release     FLORIDALMA Wylie - CNP on 4/18/2019 at 9:03 AM

## 2019-04-18 NOTE — H&P
HISTORY and [facility] [address]       NAME:  Aren Kaur  MRN: [id number]   YOB: 1955   Date: 2019   Age: 58 y.o. Gender: female       COMPLAINT AND PRESENT HISTORY:     Aren Kaur is 58 y.o. , female, Preadmission Testing for right carpal tunnel release. Pt states she has been dealing with this for over 1 year. She is right handed. The pain is described as aching, throbbing and at times burning. Pain radiates to the right elbow laterally. She was wearing a nocturnal brace, but states this did not improve the pain. Pain 5/10. Also reports numbness and tingling to the right 1st, 2nd and 3rd fingers. She has weakened  strength on right as compared to the left. She had EMG which showed carpal tunnel on right. She denies any other symptoms today. She has history of atrial fibrillation, has pacemaker/defibrillator that was last interrogated 2019. She denies any cardiac related symptoms on exam. Sees cardiology next month. PAST MEDICAL HISTORY     Past Medical History:   Diagnosis Date    Anesthesia     severe breathing issues(stopped breathing) after EGD    Asthma     Atrial fibrillation ([address]     had ablation and pacemaker/defibrillator.      CAD (coronary artery disease)     pt denies    Cancer ([address]     skin on face    GERD (gastroesophageal reflux disease)     Hypertension     Morbid obesity ([address]     Prolonged emergence from general anesthesia     Seasonal allergies     Sleep apnea     uses cpap    Tubular adenoma     Type II or unspecified type diabetes mellitus without mention of complication, not stated as uncontrolled      SURGICAL HISTORY       Past Surgical History:   Procedure Laterality Date    ABDOMEN SURGERY      abdominal adhesion surgery on bowel    ACHILLES TENDON SURGERY Right     APPENDECTOMY      BLADDER SUSPENSION      CARDIAC DEFIBRILLATOR PLACEMENT      AICD     SECTION      CHOLECYSTECTOMY      COLONOSCOPY      x3    COLONOSCOPY  3/16/16    Poor prep , not optimal for patient with polyps, repeat colonoscopy in 1- 2 years , tubular adenoma     COLONOSCOPY N/A 4/4/2018    COLONOSCOPY POLYPECTOMY SNARE performed by Kingsley Garcia MD at [address] [place] [address] ERCP  07/24/2015    biliary sphincterotomy, pacreatic duct stent placement, it has been removed    EYE SURGERY Bilateral     cataract    HYSTERECTOMY      KNEE ARTHROSCOPY Left     PACEMAKER PLACEMENT  2012    AICD, had ablation first    SKIN BIOPSY      nose    UPPER GASTROINTESTINAL ENDOSCOPY  7 23 15    bx       FAMILY HISTORY       Family History   Problem Relation Age of Onset    Cancer Father     Cancer Maternal Uncle     Cancer Paternal Uncle         colon    Cancer Paternal Uncle         lung    Cancer Paternal Uncle         pancreas    Cancer Paternal Uncle         pancreas    Cancer Paternal Uncle         pancreas    Cancer Paternal Uncle     Cancer Maternal Uncle     Cancer Maternal Uncle     Heart Disease Mother     Heart Disease Maternal Grandmother     Heart Disease Maternal Grandfather     Heart Disease Paternal Grandmother        SOCIAL HISTORY       Social History     Socioeconomic History    Marital status:      Spouse name: None    Number of children: None    Years of education: None    Highest education level: None   Occupational History    None   Social Needs    Financial resource strain: None    Food insecurity:     Worry: None     Inability: None    Transportation needs:     Medical: None     Non-medical: None   Tobacco Use    Smoking status: Never Smoker    Smokeless tobacco: Never Used   Substance and Sexual Activity    Alcohol use: No    Drug use: No    Sexual activity: None   Lifestyle    Physical activity:     Days per week: None     Minutes per session: None    Stress: None   Relationships    Social connections:     Talks on phone: None     Gets together: None     Attends Yazidi service: None Active member of club or organization: None     Attends meetings of clubs or organizations: None     Relationship status: None    Intimate partner violence:     Fear of current or ex partner: None     Emotionally abused: None     Physically abused: None     Forced sexual activity: None   Other Topics Concern    None   Social History Narrative    None           REVIEW OF SYSTEMS      Allergies   Allergen Reactions    Demerol Hcl [Meperidine]     Lipitor [Atorvastatin]      General aching  General aching    Metformin And Related        Current Outpatient Medications on File Prior to Encounter   Medication Sig Dispense Refill    TRESIBA FLEXTOUCH 200 UNIT/ML SOPN INJECT 65 UNITS INTO THE SKIN DAILY 27 mL 3    albuterol (PROVENTIL) (2.5 MG/3ML) 0.083% nebulizer solution Take 3 mLs by nebulization every 6 hours as needed for Wheezing 120 each 3    albuterol sulfate HFA (PROAIR HFA) 108 (90 Base) MCG/ACT inhaler Inhale 2 puffs into the lungs every 6 hours as needed for Wheezing 1 Inhaler 3    sotalol (BETAPACE) 80 MG tablet TAKE 1 TABLET BY MOUTH 2 TIMES DAILY 180 tablet 1    pantoprazole (PROTONIX) 40 MG tablet Take 1 tablet by mouth daily 90 tablet 1    amLODIPine (NORVASC) 5 MG tablet TAKE 1 TABLET BY MOUTH EVERY DAY 90 tablet 1    pioglitazone (ACTOS) 30 MG tablet Take 1 tablet by mouth daily 90 tablet 1    budesonide-formoterol (SYMBICORT) 160-4.5 MCG/ACT AERO TAKE 2 PUFFS BY MOUTH TWICE A DAY 10.2 Inhaler 5    Semaglutide (OZEMPIC) 0.25 or 0.5 MG/DOSE SOPN Inject 0.5 mg into the skin once a week 2 pen 5    valsartan (DIOVAN) 320 MG tablet TAKE 1 TABLET BY MOUTH DAILY 90 tablet 1    aspirin 325 MG tablet Take 325 mg by mouth every other day DUe 12/12/15      Insulin Pen Needle 32G X 4 MM MISC 1 each by Does not apply route daily 90 each 3     No current facility-administered medications on file prior to encounter. General health:  Fairly good. No fever or chills.                  Skin: +Tinel's on right, + Phalen's on right.  strength weaker on right as compared to left. Symmetrical, no pedal edema. No calf tenderness. No discoloration or ulcerations. NEUROLOGIC:  The patient is conscious, alert, oriented, No apparent focal sensory or motor deficits.                                                                                    PROVISIONAL DIAGNOSES / SURGERY:      Right Carpal Tunnel Syndrome  Right Carpal Tunnel Release     FLORIDALMA Lopez - CNP on 4/18/2019 at 9:03 AM

## 2019-04-19 ENCOUNTER — ANESTHESIA EVENT (OUTPATIENT)
Dept: OPERATING ROOM | Age: 64
End: 2019-04-19
Payer: COMMERCIAL

## 2019-04-19 RX ORDER — SODIUM CHLORIDE 0.9 % (FLUSH) 0.9 %
10 SYRINGE (ML) INJECTION PRN
Status: CANCELLED | OUTPATIENT
Start: 2019-04-19

## 2019-04-19 RX ORDER — SODIUM CHLORIDE 0.9 % (FLUSH) 0.9 %
10 SYRINGE (ML) INJECTION EVERY 12 HOURS SCHEDULED
Status: CANCELLED | OUTPATIENT
Start: 2019-04-19

## 2019-04-19 RX ORDER — LIDOCAINE HYDROCHLORIDE 10 MG/ML
1 INJECTION, SOLUTION EPIDURAL; INFILTRATION; INTRACAUDAL; PERINEURAL
Status: CANCELLED | OUTPATIENT
Start: 2019-04-19 | End: 2019-04-19

## 2019-04-19 RX ORDER — SODIUM CHLORIDE 9 MG/ML
INJECTION, SOLUTION INTRAVENOUS CONTINUOUS
Status: CANCELLED | OUTPATIENT
Start: 2019-04-19

## 2019-05-02 ENCOUNTER — HOSPITAL ENCOUNTER (OUTPATIENT)
Age: 64
Setting detail: OUTPATIENT SURGERY
Discharge: HOME | End: 2019-05-02
Attending: ORTHOPAEDIC SURGERY | Admitting: ORTHOPAEDIC SURGERY
Payer: COMMERCIAL

## 2019-05-02 ENCOUNTER — ANESTHESIA (OUTPATIENT)
Dept: OPERATING ROOM | Age: 64
End: 2019-05-02
Payer: COMMERCIAL

## 2019-05-02 VITALS
OXYGEN SATURATION: 94 % | SYSTOLIC BLOOD PRESSURE: 126 MMHG | TEMPERATURE: 97 F | BODY MASS INDEX: 47.84 KG/M2 | DIASTOLIC BLOOD PRESSURE: 74 MMHG | RESPIRATION RATE: 13 BRPM | WEIGHT: 270 LBS | HEIGHT: 63 IN | HEART RATE: 69 BPM

## 2019-05-02 VITALS
DIASTOLIC BLOOD PRESSURE: 67 MMHG | SYSTOLIC BLOOD PRESSURE: 136 MMHG | RESPIRATION RATE: 17 BRPM | OXYGEN SATURATION: 93 %

## 2019-05-02 DIAGNOSIS — G56.01 CARPAL TUNNEL SYNDROME, RIGHT: Primary | ICD-10-CM

## 2019-05-02 LAB
GLUCOSE BLD-MCNC: 83 MG/DL (ref 65–105)
GLUCOSE BLD-MCNC: 92 MG/DL (ref 65–105)

## 2019-05-02 PROCEDURE — 6360000002 HC RX W HCPCS: Performed by: NURSE ANESTHETIST, CERTIFIED REGISTERED

## 2019-05-02 PROCEDURE — 7100000001 HC PACU RECOVERY - ADDTL 15 MIN: Performed by: ORTHOPAEDIC SURGERY

## 2019-05-02 PROCEDURE — 2580000003 HC RX 258: Performed by: ANESTHESIOLOGY

## 2019-05-02 PROCEDURE — 3700000000 HC ANESTHESIA ATTENDED CARE: Performed by: ORTHOPAEDIC SURGERY

## 2019-05-02 PROCEDURE — 7100000000 HC PACU RECOVERY - FIRST 15 MIN: Performed by: ORTHOPAEDIC SURGERY

## 2019-05-02 PROCEDURE — 7100000031 HC ASPR PHASE II RECOVERY - ADDTL 15 MIN: Performed by: ORTHOPAEDIC SURGERY

## 2019-05-02 PROCEDURE — 7100000030 HC ASPR PHASE II RECOVERY - FIRST 15 MIN: Performed by: ORTHOPAEDIC SURGERY

## 2019-05-02 PROCEDURE — 3600000002 HC SURGERY LEVEL 2 BASE: Performed by: ORTHOPAEDIC SURGERY

## 2019-05-02 PROCEDURE — 82947 ASSAY GLUCOSE BLOOD QUANT: CPT

## 2019-05-02 PROCEDURE — 2580000003 HC RX 258: Performed by: NURSE ANESTHETIST, CERTIFIED REGISTERED

## 2019-05-02 PROCEDURE — 3700000001 HC ADD 15 MINUTES (ANESTHESIA): Performed by: ORTHOPAEDIC SURGERY

## 2019-05-02 PROCEDURE — 6360000002 HC RX W HCPCS: Performed by: ORTHOPAEDIC SURGERY

## 2019-05-02 PROCEDURE — 2500000003 HC RX 250 WO HCPCS: Performed by: NURSE ANESTHETIST, CERTIFIED REGISTERED

## 2019-05-02 PROCEDURE — 3600000012 HC SURGERY LEVEL 2 ADDTL 15MIN: Performed by: ORTHOPAEDIC SURGERY

## 2019-05-02 PROCEDURE — 64721 CARPAL TUNNEL SURGERY: CPT | Performed by: ORTHOPAEDIC SURGERY

## 2019-05-02 PROCEDURE — 2709999900 HC NON-CHARGEABLE SUPPLY: Performed by: ORTHOPAEDIC SURGERY

## 2019-05-02 RX ORDER — DIPHENHYDRAMINE HYDROCHLORIDE 50 MG/ML
12.5 INJECTION INTRAMUSCULAR; INTRAVENOUS
Status: DISCONTINUED | OUTPATIENT
Start: 2019-05-02 | End: 2019-05-02 | Stop reason: HOSPADM

## 2019-05-02 RX ORDER — SODIUM CHLORIDE 9 MG/ML
INJECTION, SOLUTION INTRAVENOUS CONTINUOUS
Status: DISCONTINUED | OUTPATIENT
Start: 2019-05-02 | End: 2019-05-02 | Stop reason: HOSPADM

## 2019-05-02 RX ORDER — LABETALOL HYDROCHLORIDE 5 MG/ML
INJECTION, SOLUTION INTRAVENOUS PRN
Status: DISCONTINUED | OUTPATIENT
Start: 2019-05-02 | End: 2019-05-02 | Stop reason: SDUPTHER

## 2019-05-02 RX ORDER — ONDANSETRON 2 MG/ML
4 INJECTION INTRAMUSCULAR; INTRAVENOUS
Status: DISCONTINUED | OUTPATIENT
Start: 2019-05-02 | End: 2019-05-02 | Stop reason: HOSPADM

## 2019-05-02 RX ORDER — SODIUM CHLORIDE 9 MG/ML
INJECTION, SOLUTION INTRAVENOUS CONTINUOUS PRN
Status: DISCONTINUED | OUTPATIENT
Start: 2019-05-02 | End: 2019-05-02 | Stop reason: SDUPTHER

## 2019-05-02 RX ORDER — MEPERIDINE HYDROCHLORIDE 50 MG/ML
12.5 INJECTION INTRAMUSCULAR; INTRAVENOUS; SUBCUTANEOUS EVERY 5 MIN PRN
Status: DISCONTINUED | OUTPATIENT
Start: 2019-05-02 | End: 2019-05-02 | Stop reason: HOSPADM

## 2019-05-02 RX ORDER — SODIUM CHLORIDE 0.9 % (FLUSH) 0.9 %
10 SYRINGE (ML) INJECTION EVERY 12 HOURS SCHEDULED
Status: DISCONTINUED | OUTPATIENT
Start: 2019-05-02 | End: 2019-05-02 | Stop reason: HOSPADM

## 2019-05-02 RX ORDER — HYDROCODONE BITARTRATE AND ACETAMINOPHEN 5; 325 MG/1; MG/1
1 TABLET ORAL EVERY 6 HOURS PRN
Qty: 20 TABLET | Refills: 0 | Status: SHIPPED | OUTPATIENT
Start: 2019-05-02 | End: 2019-05-09

## 2019-05-02 RX ORDER — LIDOCAINE HYDROCHLORIDE 10 MG/ML
1 INJECTION, SOLUTION EPIDURAL; INFILTRATION; INTRACAUDAL; PERINEURAL
Status: DISCONTINUED | OUTPATIENT
Start: 2019-05-02 | End: 2019-05-02 | Stop reason: HOSPADM

## 2019-05-02 RX ORDER — SODIUM CHLORIDE 0.9 % (FLUSH) 0.9 %
10 SYRINGE (ML) INJECTION PRN
Status: DISCONTINUED | OUTPATIENT
Start: 2019-05-02 | End: 2019-05-02 | Stop reason: HOSPADM

## 2019-05-02 RX ORDER — FENTANYL CITRATE 50 UG/ML
INJECTION, SOLUTION INTRAMUSCULAR; INTRAVENOUS PRN
Status: DISCONTINUED | OUTPATIENT
Start: 2019-05-02 | End: 2019-05-02 | Stop reason: SDUPTHER

## 2019-05-02 RX ORDER — ROPIVACAINE HYDROCHLORIDE 5 MG/ML
INJECTION, SOLUTION EPIDURAL; INFILTRATION; PERINEURAL PRN
Status: DISCONTINUED | OUTPATIENT
Start: 2019-05-02 | End: 2019-05-02 | Stop reason: ALTCHOICE

## 2019-05-02 RX ORDER — MIDAZOLAM HYDROCHLORIDE 1 MG/ML
INJECTION INTRAMUSCULAR; INTRAVENOUS PRN
Status: DISCONTINUED | OUTPATIENT
Start: 2019-05-02 | End: 2019-05-02 | Stop reason: SDUPTHER

## 2019-05-02 RX ORDER — LABETALOL 20 MG/4 ML (5 MG/ML) INTRAVENOUS SYRINGE
5 EVERY 10 MIN PRN
Status: DISCONTINUED | OUTPATIENT
Start: 2019-05-02 | End: 2019-05-02 | Stop reason: HOSPADM

## 2019-05-02 RX ORDER — MORPHINE SULFATE 2 MG/ML
2 INJECTION, SOLUTION INTRAMUSCULAR; INTRAVENOUS EVERY 5 MIN PRN
Status: DISCONTINUED | OUTPATIENT
Start: 2019-05-02 | End: 2019-05-02 | Stop reason: HOSPADM

## 2019-05-02 RX ADMIN — LABETALOL HYDROCHLORIDE 10 MG: 5 INJECTION, SOLUTION INTRAVENOUS at 09:00

## 2019-05-02 RX ADMIN — MIDAZOLAM 2 MG: 1 INJECTION INTRAMUSCULAR; INTRAVENOUS at 08:51

## 2019-05-02 RX ADMIN — SODIUM CHLORIDE: 9 INJECTION, SOLUTION INTRAVENOUS at 08:51

## 2019-05-02 RX ADMIN — SODIUM CHLORIDE: 9 INJECTION, SOLUTION INTRAVENOUS at 08:14

## 2019-05-02 RX ADMIN — FENTANYL CITRATE 50 MCG: 50 INJECTION, SOLUTION INTRAMUSCULAR; INTRAVENOUS at 09:10

## 2019-05-02 RX ADMIN — FENTANYL CITRATE 50 MCG: 50 INJECTION, SOLUTION INTRAMUSCULAR; INTRAVENOUS at 09:18

## 2019-05-02 ASSESSMENT — PULMONARY FUNCTION TESTS
PIF_VALUE: 1
PIF_VALUE: 0
PIF_VALUE: 1
PIF_VALUE: 1
PIF_VALUE: 0
PIF_VALUE: 0
PIF_VALUE: 1
PIF_VALUE: 0
PIF_VALUE: 0
PIF_VALUE: 1
PIF_VALUE: 0
PIF_VALUE: 1
PIF_VALUE: 0
PIF_VALUE: 1
PIF_VALUE: 0
PIF_VALUE: 0
PIF_VALUE: 1

## 2019-05-02 ASSESSMENT — PAIN SCALES - GENERAL
PAINLEVEL_OUTOF10: 0
PAINLEVEL_OUTOF10: 0

## 2019-05-02 ASSESSMENT — ENCOUNTER SYMPTOMS
SHORTNESS OF BREATH: 0
STRIDOR: 0

## 2019-05-02 ASSESSMENT — LIFESTYLE VARIABLES: SMOKING_STATUS: 0

## 2019-05-02 ASSESSMENT — PAIN DESCRIPTION - LOCATION: LOCATION: WRIST

## 2019-05-02 ASSESSMENT — PAIN DESCRIPTION - PAIN TYPE: TYPE: SURGICAL PAIN

## 2019-05-02 ASSESSMENT — PAIN DESCRIPTION - ORIENTATION: ORIENTATION: RIGHT

## 2019-05-02 NOTE — ANESTHESIA PRE PROCEDURE
Department of Anesthesiology  Preprocedure Note       Name:  Lyndsey Wyatt   Age:  58 y.o.  :  1955                                          MRN:  [id number]         Date:  2019      Surgeon: Ian Kramer):  Brigette Owusu MD    Procedure: CARPAL TUNNEL RELEASE (Right Hand)    Medications prior to admission:   Prior to Admission medications    Medication Sig Start Date End Date Taking? Authorizing Provider   HYDROcodone-acetaminophen (NORCO) 5-325 MG per tablet Take 1 tablet by mouth every 6 hours as needed for Pain for up to 7 days. Intended supply: 5 days.  Take lowest dose possible to manage pain 19 Yes Brigette Owusu MD   TRESIBA FLEXTOUCH 200 UNIT/ML SOPN INJECT 65 UNITS INTO THE SKIN DAILY 19 Yes FLORIDALMA Trent CNP   sotalol (BETAPACE) 80 MG tablet TAKE 1 TABLET BY MOUTH 2 TIMES DAILY 18  Yes FLORIDALMA Trent CNP   pantoprazole (PROTONIX) 40 MG tablet Take 1 tablet by mouth daily 18  Yes FLORIDALMA Trent CNP   amLODIPine (NORVASC) 5 MG tablet TAKE 1 TABLET BY MOUTH EVERY DAY 18  Yes FLORIDALMA Trent CNP   pioglitazone (ACTOS) 30 MG tablet Take 1 tablet by mouth daily 18  Yes FLORIDALMA Trent CNP   valsartan (DIOVAN) 320 MG tablet TAKE 1 TABLET BY MOUTH DAILY 18  Yes FLORIDALMA Trent CNP   albuterol (PROVENTIL) (2.5 MG/3ML) 0.083% nebulizer solution Take 3 mLs by nebulization every 6 hours as needed for Wheezing 18   FLORIDALMA Trent CNP   albuterol sulfate HFA (PROAIR HFA) 108 (90 Base) MCG/ACT inhaler Inhale 2 puffs into the lungs every 6 hours as needed for Wheezing 18   FLORIDALMA Trent CNP   budesonide-formoterol (SYMBICORT) 160-4.5 MCG/ACT AERO TAKE 2 PUFFS BY MOUTH TWICE A DAY 18   FLORIDALMA Trent CNP   Semaglutide [facility]) 0.25 or 0.5 MG/DOSE SOPN Inject 0.5 mg into the skin once a week 18   FLORIDALMA Trent - CNP Insulin Pen Needle 32G X 4 MM MISC 1 each by Does not apply route daily 2/14/18   Yelena Bernabe, APRN - CNP   aspirin 325 MG tablet Take 325 mg by mouth every other day DUe 12/12/15    Historical Provider, MD       Current medications:    Current Facility-Administered Medications   Medication Dose Route Frequency Provider Last Rate Last Dose    0.9 % sodium chloride infusion   Intravenous Continuous Elana Parsons  mL/hr at 05/02/19 0814      lidocaine PF 1 % injection 1 mL  1 mL Intradermal Once PRN Elana Parsons MD        sodium chloride flush 0.9 % injection 10 mL  10 mL Intravenous 2 times per day Elana Parsons MD        sodium chloride flush 0.9 % injection 10 mL  10 mL Intravenous PRN Elana Parsons MD           Allergies: Allergies   Allergen Reactions    Demerol Hcl [Meperidine]     Lipitor [Atorvastatin]      General aching  General aching    Metformin And Related        Problem List:    Patient Active Problem List   Diagnosis Code    CAD (coronary artery disease) I25.10    Type 2 diabetes mellitus with diabetic polyneuropathy, with long-term current use of insulin (HCC) E11.42, Z79.4    GERD (gastroesophageal reflux disease) K21.9    Hypertension I10    Atrial fibrillation ([address] I48.91    Liver cyst K76.89    Colon polyps K63.5    Fatty liver K76.0    Abdominal pain R10.9    Nausea R11.0    Diarrhea R19.7    Hypertriglyceridemia E78.1    VIVIANA on CPAP G47.33, Z99.89    RUQ abdominal pain R10.11    Asthma J45.909    Obstructive sleep apnea syndrome G47.33    Tubular adenoma D36.9    Gastroesophageal reflux disease without esophagitis K21.9    Rectal bleeding K62.5       Past Medical History:        Diagnosis Date    Anesthesia     severe breathing issues(stopped breathing) after EGD    Asthma     Atrial fibrillation ([address]     had ablation and pacemaker/defibrillator.      CAD (coronary artery disease)     pt denies    Cancer ([address]     skin on face 05/01/19                        Date of last solid food consumption: 05/01/19    BMI:   Wt Readings from Last 3 Encounters:   05/02/19 270 lb (122.5 kg)   04/18/19 270 lb (122.5 kg)   11/29/18 252 lb (114.3 kg)     Body mass index is 48.6 kg/m². CBC:   Lab Results   Component Value Date    WBC 5.3 04/18/2019    RBC 5.20 04/18/2019    RBC 4.23 06/05/2012    HGB 14.2 04/18/2019    HCT 43.9 04/18/2019    MCV 84.5 04/18/2019    RDW 16.4 04/18/2019     04/18/2019     06/05/2012       CMP:   Lab Results   Component Value Date     04/18/2019    K 4.3 04/18/2019     04/18/2019    CO2 29 04/18/2019    BUN 16 04/18/2019    CREATININE 0.91 04/18/2019    GFRAA >60 04/18/2019    LABGLOM >60 04/18/2019    GLUCOSE 123 04/18/2019    GLUCOSE 224 06/05/2012    PROT 7.2 12/10/2018    CALCIUM 9.1 04/18/2019    BILITOT 0.36 12/10/2018    ALKPHOS 71 12/10/2018    AST 19 12/10/2018    ALT 20 12/10/2018       POC Tests:   Recent Labs     05/02/19  0811   POCGLU 92       Coags:   Lab Results   Component Value Date    PROTIME 10.2 06/17/2012    PROTIME 10.7 04/24/2012    INR 1.0 06/17/2012    APTT 24.5 06/17/2012       HCG (If Applicable): No results found for: PREGTESTUR, PREGSERUM, HCG, HCGQUANT     ABGs: No results found for: PHART, PO2ART, YZG6YNY, RLU7QRH, BEART, W2BOGCRV     Type & Screen (If Applicable):  No results found for: LABABO, [address]    Anesthesia Evaluation  Patient summary reviewed   history of anesthetic complications: PONV.   Airway: Mallampati: III  TM distance: >3 FB   Neck ROM: full  Mouth opening: > = 3 FB Dental: normal exam         Pulmonary:normal exam  breath sounds clear to auscultation  (+) sleep apnea:  asthma: allergic asthma,     (-) pneumonia, COPD, shortness of breath, recent URI, rhonchi, wheezes, rales, stridor and not a current smoker                           Cardiovascular:Negative CV ROS  Exercise tolerance: good (>4 METS),   (+) hypertension: no interval change, CAD: no interval change,     (-) pacemaker, valvular problems/murmurs, past MI, CABG/stent, dysrhythmias,  angina,  CHF, orthopnea, PND,  ARTEAGA, murmur, weak pulses,  friction rub, systolic click, carotid bruit,  JVD and peripheral edema    ECG reviewed  Rhythm: regular  Rate: normal           Beta Blocker:  Dose within 24 Hrs         Neuro/Psych:   (+) neuromuscular disease:,    (-) seizures, TIA, CVA, headaches, psychiatric history and depression/anxiety            GI/Hepatic/Renal:   (+) GERD: no interval change, liver disease:,      (-) hiatal hernia, PUD, hepatitis, no renal disease, bowel prep and no morbid obesity       Endo/Other:    (+) DiabetesType II DM, , no malignancy/cancer. (-) hypothyroidism, hyperthyroidism, blood dyscrasia, arthritis, no electrolyte abnormalities, no malignancy/cancer               Abdominal:           Vascular: negative vascular ROS. - PVD, DVT and PE. Anesthesia Plan      MAC, general and regional     ASA 3       Induction: intravenous. MIPS: Postoperative opioids intended and Prophylactic antiemetics administered. Anesthetic plan and risks discussed with patient. Plan discussed with CRNA.                   Pantera Knox MD   5/2/2019

## 2019-05-02 NOTE — INTERVAL H&P NOTE
HISTORY and [address] [facility]       NAME:  Riley Corona  MRN: [id number]   YOB: 1955   Date: 5/2/2019   Age: 58 y.o. Gender: female     H&P Update Note    H&P from 4/18/2019 reviewed and updated. Patient examined. INTERVAL HISTORY:     Patient is feeling well today, denies any fever/chills, chest pain, shortness of breath. No interval changes to past medical history, social history, family history. Review of systems as stated above and otherwise negative. PHYSICAL EXAM:     Vitals: BP (!) 147/81   Pulse 75   Temp 97.5 °F (36.4 °C) (Oral)   Resp 16   Ht 5' 2.5\" (1.588 m)   Wt 270 lb (122.5 kg)   SpO2 96%   BMI 48.60 kg/m²  Body mass index is 48.6 kg/m². Patient is alert and oriented, in no distress. Hypertensive. Heart rate and rhythm are regular. Lungs clear to auscultation bilaterally. Abdomen is soft, non tender. No pedal edema. No interval changes. I concur with the findings.      Connie Skaggs PA-C on 5/2/2019 at 8:06 AM

## 2019-05-09 ENCOUNTER — TELEPHONE (OUTPATIENT)
Dept: GASTROENTEROLOGY | Age: 64
End: 2019-05-09

## 2019-05-09 NOTE — TELEPHONE ENCOUNTER
Called patient to r/s appointment that was for 5/13/19 due to provider conflict. PER patient good because she just had surgery and had to cancel anyway and will call back to r/s.

## 2019-05-15 ENCOUNTER — OFFICE VISIT (OUTPATIENT)
Dept: ORTHOPEDIC SURGERY | Age: 64
End: 2019-05-15

## 2019-05-15 DIAGNOSIS — Z98.890 S/P CARPAL TUNNEL RELEASE: Primary | ICD-10-CM

## 2019-05-15 LAB
EKG ATRIAL RATE: 69 BPM
EKG P AXIS: 57 DEGREES
EKG P-R INTERVAL: 234 MS
EKG Q-T INTERVAL: 510 MS
EKG QRS DURATION: 176 MS
EKG QTC CALCULATION (BAZETT): 546 MS
EKG R AXIS: -32 DEGREES
EKG T AXIS: 85 DEGREES
EKG VENTRICULAR RATE: 69 BPM

## 2019-05-15 PROCEDURE — 99024 POSTOP FOLLOW-UP VISIT: CPT | Performed by: ORTHOPAEDIC SURGERY

## 2019-05-15 NOTE — PROGRESS NOTES
Keysha Angelica Ndiaye [facility], [address] [facility],[address], [address] [facility]             Dept Phone: [phone]             Dept Fax:  [phone] returns today status post right carpal tunnel release on 5/2/19. She states all the numbness and tingling is gone and she sleeping better    Examination notes her wound is pristine. She's covered with Steri-Strips she can move her fingers well good sensation good     Impression  Status post right carpal tunnel release    Plan  Patient was given post carpal tunnel instructions. Back here when necessary  Review of Systems   Constitutional: Negative. HENT: Negative. Respiratory: Negative. Cardiovascular: Negative. Neurological: Negative.     Musculoskeletal:   Post-Op Check (Rt CTS Release 5/2/19)          Current Outpatient Medications:     albuterol (PROVENTIL) (2.5 MG/3ML) 0.083% nebulizer solution, Take 3 mLs by nebulization every 6 hours as needed for Wheezing, Disp: 120 each, Rfl: 3    albuterol sulfate HFA (PROAIR HFA) 108 (90 Base) MCG/ACT inhaler, Inhale 2 puffs into the lungs every 6 hours as needed for Wheezing, Disp: 1 Inhaler, Rfl: 3    sotalol (BETAPACE) 80 MG tablet, TAKE 1 TABLET BY MOUTH 2 TIMES DAILY, Disp: 180 tablet, Rfl: 1    pantoprazole (PROTONIX) 40 MG tablet, Take 1 tablet by mouth daily, Disp: 90 tablet, Rfl: 1    amLODIPine (NORVASC) 5 MG tablet, TAKE 1 TABLET BY MOUTH EVERY DAY, Disp: 90 tablet, Rfl: 1    pioglitazone (ACTOS) 30 MG tablet, Take 1 tablet by mouth daily, Disp: 90 tablet, Rfl: 1    budesonide-formoterol (SYMBICORT) 160-4.5 MCG/ACT AERO, TAKE 2 PUFFS BY MOUTH TWICE A DAY, Disp: 10.2 Inhaler, Rfl: 5    Semaglutide (OZEMPIC) 0.25 or 0.5 MG/DOSE SOPN, Inject 0.5 mg into the skin once a week, Disp: 2 pen, Rfl: 5    valsartan (DIOVAN) 320 MG tablet, TAKE 1 TABLET BY MOUTH DAILY, Disp: 90 tablet, Rfl: 1    Insulin Pen Needle 32G X 4 MM MISC, 1 each by Does not apply route daily, Disp: 90 each, Rfl: 3    aspirin 325 MG tablet, Take 325 mg by mouth every other day DUe 12/12/15, Disp: , Rfl:     Allergies   Allergen Reactions    Demerol Hcl [Meperidine]     Lipitor [Atorvastatin]      General aching  General aching    Metformin And Related        Social History     Socioeconomic History    Marital status:      Spouse name: Not on file    Number of children: Not on file    Years of education: Not on file    Highest education level: Not on file   Occupational History    Not on file   Social Needs    Financial resource strain: Not on file    Food insecurity:     Worry: Not on file     Inability: Not on file    Transportation needs:     Medical: Not on file     Non-medical: Not on file   Tobacco Use    Smoking status: Never Smoker    Smokeless tobacco: Never Used   Substance and Sexual Activity    Alcohol use: No    Drug use: No    Sexual activity: Not on file   Lifestyle    Physical activity:     Days per week: Not on file     Minutes per session: Not on file    Stress: Not on file   Relationships    Social connections:     Talks on phone: Not on file     Gets together: Not on file     Attends Protestant service: Not on file     Active member of club or organization: Not on file     Attends meetings of clubs or organizations: Not on file     Relationship status: Not on file    Intimate partner violence:     Fear of current or ex partner: Not on file     Emotionally abused: Not on file     Physically abused: Not on file     Forced sexual activity: Not on file   Other Topics Concern    Not on file   Social History Narrative    Not on file       Past Medical History:   Diagnosis Date    Anesthesia     severe breathing issues(stopped breathing) after EGD    Asthma     Atrial fibrillation ([facility]     had ablation and pacemaker/defibrillator.      CAD (coronary artery disease)     pt denies    Cancer ([facility]     skin on face    GERD (gastroesophageal reflux disease)     Hypertension     Morbid obesity ([facility]     Prolonged emergence from general anesthesia     Seasonal allergies     Sleep apnea     uses cpap    Tubular adenoma     Type II or unspecified type diabetes mellitus without mention of complication, not stated as uncontrolled      Past Surgical History:   Procedure Laterality Date    ABDOMEN SURGERY      abdominal adhesion surgery on bowel    ACHILLES TENDON SURGERY Right     APPENDECTOMY      BLADDER SUSPENSION      CARDIAC DEFIBRILLATOR PLACEMENT  2012    AICD    CARPAL TUNNEL RELEASE Right 5/2/2019    CARPAL TUNNEL RELEASE performed by Jillian Walker MD at [address] [place] [address] Road COLONOSCOPY      x3    COLONOSCOPY  3/16/16    Poor prep , not optimal for patient with polyps, repeat colonoscopy in 1- 2 years , tubular adenoma     COLONOSCOPY N/A 4/4/2018    COLONOSCOPY POLYPECTOMY SNARE performed by Milagros Hogan MD at [address] [facility]CP  07/24/2015    biliary sphincterotomy, pacreatic duct stent placement, it has been removed    EYE SURGERY Bilateral     cataract    HYSTERECTOMY      KNEE ARTHROSCOPY Left     PACEMAKER PLACEMENT  2012    AICD, had ablation first    SKIN BIOPSY      nose    UPPER GASTROINTESTINAL ENDOSCOPY  7 23 15    bx     Family History   Problem Relation Age of Onset    Cancer Father     Cancer Maternal Uncle     Cancer Paternal Uncle         colon    Cancer Paternal Uncle         lung    Cancer Paternal Uncle         pancreas    Cancer Paternal Uncle         pancreas    Cancer Paternal Uncle         pancreas    Cancer Paternal Uncle     Cancer Maternal Uncle     Cancer Maternal Uncle     Heart Disease Mother     Heart Disease Maternal Grandmother     Heart Disease Maternal Grandfather     Heart Disease Paternal Grandmother          No orders of the defined types were placed in this encounter.       1. S/P carpal tunnel release

## 2019-06-18 ENCOUNTER — HOSPITAL ENCOUNTER (OUTPATIENT)
Dept: NUCLEAR MEDICINE | Age: 64
Discharge: HOME | End: 2019-06-20
Payer: COMMERCIAL

## 2019-06-18 ENCOUNTER — HOSPITAL ENCOUNTER (OUTPATIENT)
Dept: NON INVASIVE DIAGNOSTICS | Age: 64
Discharge: HOME | End: 2019-06-18
Payer: COMMERCIAL

## 2019-06-18 VITALS — BODY MASS INDEX: 49.69 KG/M2 | HEIGHT: 62 IN | WEIGHT: 270 LBS

## 2019-06-18 DIAGNOSIS — R06.02 SOB (SHORTNESS OF BREATH): ICD-10-CM

## 2019-06-18 DIAGNOSIS — R53.83 FATIGUE, UNSPECIFIED TYPE: ICD-10-CM

## 2019-06-18 LAB
LV EF: 45 %
LV EF: 48 %
LVEF MODALITY: NORMAL
LVEF MODALITY: NORMAL

## 2019-06-18 PROCEDURE — 6360000004 HC RX CONTRAST MEDICATION: Performed by: INTERNAL MEDICINE

## 2019-06-18 PROCEDURE — 3430000000 HC RX DIAGNOSTIC RADIOPHARMACEUTICAL: Performed by: NURSE PRACTITIONER

## 2019-06-18 PROCEDURE — 78452 HT MUSCLE IMAGE SPECT MULT: CPT

## 2019-06-18 PROCEDURE — 93017 CV STRESS TEST TRACING ONLY: CPT

## 2019-06-18 PROCEDURE — 2580000003 HC RX 258: Performed by: NURSE PRACTITIONER

## 2019-06-18 PROCEDURE — 6360000002 HC RX W HCPCS: Performed by: NURSE PRACTITIONER

## 2019-06-18 PROCEDURE — C8929 TTE W OR WO FOL WCON,DOPPLER: HCPCS

## 2019-06-18 PROCEDURE — A9500 TC99M SESTAMIBI: HCPCS | Performed by: NURSE PRACTITIONER

## 2019-06-18 RX ORDER — SODIUM CHLORIDE 0.9 % (FLUSH) 0.9 %
10 SYRINGE (ML) INJECTION PRN
Status: ACTIVE | OUTPATIENT
Start: 2019-06-18 | End: 2019-06-18

## 2019-06-18 RX ORDER — SODIUM CHLORIDE 0.9 % (FLUSH) 0.9 %
10 SYRINGE (ML) INJECTION PRN
Status: DISCONTINUED | OUTPATIENT
Start: 2019-06-18 | End: 2019-06-21 | Stop reason: HOSPADM

## 2019-06-18 RX ORDER — ALBUTEROL SULFATE 90 UG/1
2 AEROSOL, METERED RESPIRATORY (INHALATION) PRN
Status: ACTIVE | OUTPATIENT
Start: 2019-06-18 | End: 2019-06-18

## 2019-06-18 RX ORDER — SODIUM CHLORIDE 9 MG/ML
500 INJECTION, SOLUTION INTRAVENOUS CONTINUOUS PRN
Status: ACTIVE | OUTPATIENT
Start: 2019-06-18 | End: 2019-06-18

## 2019-06-18 RX ORDER — AMINOPHYLLINE DIHYDRATE 25 MG/ML
50 INJECTION, SOLUTION INTRAVENOUS PRN
Status: ACTIVE | OUTPATIENT
Start: 2019-06-18 | End: 2019-06-18

## 2019-06-18 RX ORDER — NITROGLYCERIN 0.4 MG/1
0.4 TABLET SUBLINGUAL EVERY 5 MIN PRN
Status: ACTIVE | OUTPATIENT
Start: 2019-06-18 | End: 2019-06-18

## 2019-06-18 RX ORDER — ATROPINE SULFATE 0.1 MG/ML
0.5 INJECTION INTRAVENOUS EVERY 5 MIN PRN
Status: ACTIVE | OUTPATIENT
Start: 2019-06-18 | End: 2019-06-18

## 2019-06-18 RX ORDER — METOPROLOL TARTRATE 5 MG/5ML
5 INJECTION INTRAVENOUS EVERY 5 MIN PRN
Status: ACTIVE | OUTPATIENT
Start: 2019-06-18 | End: 2019-06-18

## 2019-06-18 RX ADMIN — TETRAKIS(2-METHOXYISOBUTYLISOCYANIDE)COPPER(I) TETRAFLUOROBORATE 12.5 MILLICURIE: 1 INJECTION, POWDER, LYOPHILIZED, FOR SOLUTION INTRAVENOUS at 07:11

## 2019-06-18 RX ADMIN — PERFLUTREN 2.2 MG: 6.52 INJECTION, SUSPENSION INTRAVENOUS at 08:26

## 2019-06-18 RX ADMIN — Medication 10 ML: at 09:08

## 2019-06-18 RX ADMIN — REGADENOSON 0.4 MG: 0.08 INJECTION, SOLUTION INTRAVENOUS at 09:08

## 2019-06-18 RX ADMIN — Medication 10 ML: at 08:41

## 2019-06-18 RX ADMIN — TETRAKIS(2-METHOXYISOBUTYLISOCYANIDE)COPPER(I) TETRAFLUOROBORATE 36.9 MILLICURIE: 1 INJECTION, POWDER, LYOPHILIZED, FOR SOLUTION INTRAVENOUS at 09:11

## 2019-06-18 RX ADMIN — Medication 10 ML: at 07:11

## 2019-06-18 NOTE — PROGRESS NOTES
PATIENT PLACED ON ECG AND VITALS MACHINE, IV FLUSHED WITH NORMAL SALINE.  PATIENT DENIES CHEST PAIN AND SHORTNESS OF BREATH, PATIENT NSR WITH LBBB/1AVB ON MONITO HR 68, AND /85

## 2019-06-18 NOTE — PROGRESS NOTES
LEXISCAN COMPLETED, PATIENT C/O LIGHTHEADEDNESS AND C/O SHORTNESS OF BREATH, HR 78, AND /90, PATIENT REMAINS NSR WITH LBBB/1AVB ON MONITOR

## 2019-06-18 NOTE — PROGRESS NOTES
PATIENT BACK TO BASELINE, HR 72, /93, AND IV REMOVED, DR HUBBARD REVIEWED, ECG AND WAITING FOR FINAL SCANS IN RADIOLOGY

## 2019-06-18 NOTE — PROGRESS NOTES
CAFFEINE GIVEN AFTER ONE MINUTE, PATIENT STATES THE LIGHTHEADEDNESS AND SHORTNESS OF BREATH ARE GOING AWAY, DENEIS CHEST PAIN.   HR 75, /93

## 2019-07-15 ENCOUNTER — HOSPITAL ENCOUNTER (OUTPATIENT)
Age: 64
Setting detail: SPECIMEN
Discharge: HOME | End: 2019-07-15
Payer: COMMERCIAL

## 2019-07-15 DIAGNOSIS — Z79.4 TYPE 2 DIABETES MELLITUS WITH DIABETIC MONONEUROPATHY, WITH LONG-TERM CURRENT USE OF INSULIN (HCC): ICD-10-CM

## 2019-07-15 DIAGNOSIS — E11.41 TYPE 2 DIABETES MELLITUS WITH DIABETIC MONONEUROPATHY, WITH LONG-TERM CURRENT USE OF INSULIN (HCC): ICD-10-CM

## 2019-07-15 LAB
CREATININE URINE: 131.4 MG/DL (ref 28–217)
MICROALBUMIN/CREAT 24H UR: 33 MG/L
MICROALBUMIN/CREAT UR-RTO: 25 MCG/MG CREAT

## 2019-10-01 ENCOUNTER — INITIAL CONSULT (OUTPATIENT)
Dept: ONCOLOGY | Age: 64
End: 2019-10-01
Payer: COMMERCIAL

## 2019-10-01 DIAGNOSIS — Z80.0 FAMILY HISTORY OF COLON CANCER: Primary | ICD-10-CM

## 2019-10-01 PROCEDURE — 96040 PR GENETIC COUNSELING, EACH 30 MIN: CPT | Performed by: GENETIC COUNSELOR, MS

## 2019-10-18 ENCOUNTER — TELEPHONE (OUTPATIENT)
Dept: ONCOLOGY | Age: 64
End: 2019-10-18

## 2019-11-06 ENCOUNTER — HOSPITAL ENCOUNTER (OUTPATIENT)
Age: 64
Setting detail: SPECIMEN
Discharge: HOME | End: 2019-11-06
Payer: COMMERCIAL

## 2019-11-06 ENCOUNTER — HOSPITAL ENCOUNTER (OUTPATIENT)
Facility: CLINIC | Age: 64
Discharge: HOME | End: 2019-11-08
Payer: COMMERCIAL

## 2019-11-06 ENCOUNTER — HOSPITAL ENCOUNTER (OUTPATIENT)
Dept: GENERAL RADIOLOGY | Facility: CLINIC | Age: 64
Discharge: HOME | End: 2019-11-08
Payer: COMMERCIAL

## 2019-11-06 DIAGNOSIS — R10.84 GENERALIZED ABDOMINAL PAIN: ICD-10-CM

## 2019-11-06 DIAGNOSIS — R19.8 ALTERNATING CONSTIPATION AND DIARRHEA: ICD-10-CM

## 2019-11-06 LAB
ABSOLUTE EOS #: 0.2 K/UL (ref 0–0.44)
ABSOLUTE IMMATURE GRANULOCYTE: 0.03 K/UL (ref 0–0.3)
ABSOLUTE LYMPH #: 2.1 K/UL (ref 1.1–3.7)
ABSOLUTE MONO #: 0.37 K/UL (ref 0.1–1.2)
ALBUMIN SERPL-MCNC: 3.9 G/DL (ref 3.5–5.2)
ALBUMIN/GLOBULIN RATIO: 1 (ref 1–2.5)
ALP BLD-CCNC: 71 U/L (ref 35–104)
ALT SERPL-CCNC: 30 U/L (ref 5–33)
AMYLASE: 43 U/L (ref 28–100)
ANION GAP SERPL CALCULATED.3IONS-SCNC: 13 MMOL/L (ref 9–17)
AST SERPL-CCNC: 33 U/L
BASOPHILS # BLD: 0 % (ref 0–2)
BASOPHILS ABSOLUTE: 0.03 K/UL (ref 0–0.2)
BILIRUB SERPL-MCNC: 0.29 MG/DL (ref 0.3–1.2)
BUN BLDV-MCNC: 24 MG/DL (ref 8–23)
BUN/CREAT BLD: ABNORMAL (ref 9–20)
CALCIUM SERPL-MCNC: 9.5 MG/DL (ref 8.6–10.4)
CHLORIDE BLD-SCNC: 100 MMOL/L (ref 98–107)
CO2: 28 MMOL/L (ref 20–31)
CREAT SERPL-MCNC: 0.73 MG/DL (ref 0.5–0.9)
DIFFERENTIAL TYPE: ABNORMAL
EOSINOPHILS RELATIVE PERCENT: 3 % (ref 1–4)
GFR AFRICAN AMERICAN: >60 ML/MIN
GFR NON-AFRICAN AMERICAN: >60 ML/MIN
GFR SERPL CREATININE-BSD FRML MDRD: ABNORMAL ML/MIN/{1.73_M2}
GFR SERPL CREATININE-BSD FRML MDRD: ABNORMAL ML/MIN/{1.73_M2}
GLUCOSE BLD-MCNC: 120 MG/DL (ref 70–99)
HCT VFR BLD CALC: 51.2 % (ref 36.3–47.1)
HEMOGLOBIN: 15.5 G/DL (ref 11.9–15.1)
IMMATURE GRANULOCYTES: 0 %
LIPASE: 43 U/L (ref 13–60)
LYMPHOCYTES # BLD: 30 % (ref 24–43)
MCH RBC QN AUTO: 26.5 PG (ref 25.2–33.5)
MCHC RBC AUTO-ENTMCNC: 30.3 G/DL (ref 28.4–34.8)
MCV RBC AUTO: 87.5 FL (ref 82.6–102.9)
MONOCYTES # BLD: 5 % (ref 3–12)
NRBC AUTOMATED: 0 PER 100 WBC
PDW BLD-RTO: 16 % (ref 11.8–14.4)
PLATELET # BLD: 169 K/UL (ref 138–453)
PLATELET ESTIMATE: ABNORMAL
PMV BLD AUTO: 12.1 FL (ref 8.1–13.5)
POTASSIUM SERPL-SCNC: 4.4 MMOL/L (ref 3.7–5.3)
RBC # BLD: 5.85 M/UL (ref 3.95–5.11)
RBC # BLD: ABNORMAL 10*6/UL
SEG NEUTROPHILS: 61 % (ref 36–65)
SEGMENTED NEUTROPHILS ABSOLUTE COUNT: 4.22 K/UL (ref 1.5–8.1)
SODIUM BLD-SCNC: 141 MMOL/L (ref 135–144)
TOTAL PROTEIN: 7.9 G/DL (ref 6.4–8.3)
WBC # BLD: 7 K/UL (ref 3.5–11.3)
WBC # BLD: ABNORMAL 10*3/UL

## 2019-11-06 PROCEDURE — 74019 RADEX ABDOMEN 2 VIEWS: CPT

## 2019-11-15 ENCOUNTER — HOSPITAL ENCOUNTER (OUTPATIENT)
Dept: CT IMAGING | Age: 64
Discharge: HOME | End: 2019-11-17
Payer: COMMERCIAL

## 2019-11-15 DIAGNOSIS — R63.4 WEIGHT LOSS: ICD-10-CM

## 2019-11-15 DIAGNOSIS — R10.84 GENERALIZED ABDOMINAL PAIN: ICD-10-CM

## 2019-11-15 DIAGNOSIS — R11.0 NAUSEA: ICD-10-CM

## 2019-11-15 DIAGNOSIS — R19.8 ALTERNATING CONSTIPATION AND DIARRHEA: ICD-10-CM

## 2019-11-15 DIAGNOSIS — R63.0 LACK OF APPETITE: ICD-10-CM

## 2019-11-15 PROCEDURE — 6360000004 HC RX CONTRAST MEDICATION: Performed by: NURSE PRACTITIONER

## 2019-11-15 PROCEDURE — 74177 CT ABD & PELVIS W/CONTRAST: CPT

## 2019-11-15 PROCEDURE — 2580000003 HC RX 258: Performed by: NURSE PRACTITIONER

## 2019-11-15 RX ORDER — SODIUM CHLORIDE 0.9 % (FLUSH) 0.9 %
10 SYRINGE (ML) INJECTION PRN
Status: DISCONTINUED | OUTPATIENT
Start: 2019-11-15 | End: 2019-11-18 | Stop reason: HOSPADM

## 2019-11-15 RX ORDER — 0.9 % SODIUM CHLORIDE 0.9 %
80 INTRAVENOUS SOLUTION INTRAVENOUS ONCE
Status: COMPLETED | OUTPATIENT
Start: 2019-11-15 | End: 2019-11-15

## 2019-11-15 RX ADMIN — IOPAMIDOL 75 ML: 755 INJECTION, SOLUTION INTRAVENOUS at 09:44

## 2019-11-15 RX ADMIN — IOHEXOL 50 ML: 240 INJECTION, SOLUTION INTRATHECAL; INTRAVASCULAR; INTRAVENOUS; ORAL at 09:45

## 2019-11-15 RX ADMIN — SODIUM CHLORIDE 80 ML: 9 INJECTION, SOLUTION INTRAVENOUS at 09:44

## 2019-11-15 RX ADMIN — Medication 10 ML: at 09:44

## 2020-02-05 ENCOUNTER — OFFICE VISIT (OUTPATIENT)
Dept: BARIATRICS/WEIGHT MGMT | Age: 65
End: 2020-02-05
Payer: COMMERCIAL

## 2020-02-05 VITALS
DIASTOLIC BLOOD PRESSURE: 72 MMHG | HEART RATE: 76 BPM | WEIGHT: 261 LBS | RESPIRATION RATE: 18 BRPM | HEIGHT: 62 IN | SYSTOLIC BLOOD PRESSURE: 122 MMHG | BODY MASS INDEX: 48.03 KG/M2

## 2020-02-05 PROCEDURE — 1036F TOBACCO NON-USER: CPT | Performed by: SURGERY

## 2020-02-05 PROCEDURE — 1090F PRES/ABSN URINE INCON ASSESS: CPT | Performed by: SURGERY

## 2020-02-05 PROCEDURE — 3046F HEMOGLOBIN A1C LEVEL >9.0%: CPT | Performed by: SURGERY

## 2020-02-05 PROCEDURE — 99204 OFFICE O/P NEW MOD 45 MIN: CPT | Performed by: SURGERY

## 2020-02-05 PROCEDURE — G8427 DOCREV CUR MEDS BY ELIG CLIN: HCPCS | Performed by: SURGERY

## 2020-02-05 PROCEDURE — 2022F DILAT RTA XM EVC RTNOPTHY: CPT | Performed by: SURGERY

## 2020-02-05 PROCEDURE — 1123F ACP DISCUSS/DSCN MKR DOCD: CPT | Performed by: SURGERY

## 2020-02-05 PROCEDURE — 4040F PNEUMOC VAC/ADMIN/RCVD: CPT | Performed by: SURGERY

## 2020-02-05 PROCEDURE — 3017F COLORECTAL CA SCREEN DOC REV: CPT | Performed by: SURGERY

## 2020-02-05 PROCEDURE — G8400 PT W/DXA NO RESULTS DOC: HCPCS | Performed by: SURGERY

## 2020-02-05 PROCEDURE — G8417 CALC BMI ABV UP PARAM F/U: HCPCS | Performed by: SURGERY

## 2020-02-05 PROCEDURE — G8484 FLU IMMUNIZE NO ADMIN: HCPCS | Performed by: SURGERY

## 2020-02-05 RX ORDER — LORATADINE 10 MG/1
10 CAPSULE, LIQUID FILLED ORAL DAILY
COMMUNITY
End: 2020-02-18 | Stop reason: SDUPTHER

## 2020-02-05 NOTE — LETTER
Visit Date: 2020    Patient: Elizabet Welch  YOB: 1955    Dear Dr. Caitlyn Robin, APRN - CNP,      I had the pleasure of seeing Jennie Avery in the office today for a consult for weight loss surgery. Her current Weight: 261 lb (118.4 kg), which gives her a Body mass index is 47.73 kg/m². .  We had a long discussion regarding surgical options for weight loss and improvement in co-morbidities. She is considering a bariatric  procedure. We will start the preoperative workup, which includes bloodwork, psychological evaluation, support group attendance, and preoperative medical clearance from you. We will also initiate pre-certification for surgery, which requires a letter of medical necessity from you and often office notes documenting a weight history and co-morbidities. Jennie Avery will require 6 months of medically supervised visits as specified by the patient's insurance. Thank you for allowing me to participate in the care of your patient. If you have any questions or concerns, please do not hesitate to call.       Sincerely,     Nataliya Holder DO  Director of Bariatric and Minimally Invasive Surgery  [facility]IOJ[facility], [address] [facility], [address] [place] [address] [phone]  F: [phone]

## 2020-02-05 NOTE — PATIENT INSTRUCTIONS
Smoking before and after Bariatric Surgery    What we need to know. .. As you are aware smoking can increase everyone's risk of : heart attack, stroke, lung cancer, infections, increase blood pressure, COPD and other breathing problems. But did you know when it is combined with bariatric surgery , cigarette smoking complications increase 2-5EM more than those who do not smoke. Cigarette use can cause ulcer formation and increase the risk of ulcer complications such as bleeding, stomach obstruction ( blockage) and perforation ( holes in the pouch) and can require life threatening emergency surgery to fix it. Cigarette smoking is also a leading cause of ulcer medication treatment not working. Cigarette smoking can also cause strictures ( narrowing ) of the entrance of your new stomach (pouch ) causing sever pain, vomiting and nausea. Here is why. .. Tobacco smoke causes the blood vessels to constrict ( shrink) which decreases blood flow to your new stomach (pouch) . Carbon monoxide ( a substance produced when smoking) reduces the ability of your blood red cells to carry oxygen. Both of the factors can cause slow healing after bariatric surgery and can continue to be a concern years after surgery. Smoking also has been shown to increase the risks of blood clots in people having any type of surgery. Blood clots can be deadly causing strokes and heart attacks. We have seen patients years after surgery start smoking again and they have some of the complications listed above and have resulted in further surgery and even death. Prior and after surgery we stress that you can not smoke. We ask that you abstain from all nicotine use including, vaping, e-cigarette, nicotine patches and nicotine gum. Please be aware of all the risks.

## 2020-02-16 NOTE — PROGRESS NOTES
[facility] PHYSICIANS  [facility] INVASIVE BARIATRIC SURG  [address] [place] [address] [place]  [address] [place] [id number]  Dept: [phone]    SURGICAL WEIGHT MANAGEMENT PROGRAM  PROGRESS NOTE INITIAL EVALUATION     Patient: Jessika Kimball        Service Date: 2/5/2020     HPI:     Chief Complaint   Patient presents with    Bariatric, Initial Visit    Weight Loss       The patient is a pleasant 71y.o. year old female  with morbid obesity, who stands Height: 5' 2.01\" (157.5 cm) tall with a weight of Weight: 261 lb (118.4 kg) , resulting in a BMI of Body mass index is 47.73 kg/m². . The patient suffers from multiple co-morbidities as a result of morbid obesity, including: Type 2 Diabetes Mellitus, Hypertension, Asthma, Obstructive Sleep Apnea, GERD and Degenerative Joint Disease (DJD). She has suffered from obesity for many years. The patient denies  a history of deep vein thrombosis, pulmonary embolism, renal failure, hepatic failure, stroke and seizure. She has a history of Atrial Fibrillation    The patient has failed multiple attempts at non-surgical weight loss, and is now seeking surgical intervention to promote permanent and consistent weight loss. She  has chosen Sleeve Gastrectomy. She is well educated regarding it, as she has recently viewed our weight loss surgery informational seminar . Medical History:  Past Medical History:   Diagnosis Date    Anesthesia     severe breathing issues(stopped breathing) after EGD    Asthma     Atrial fibrillation ([address]     had ablation and pacemaker/defibrillator.      CAD (coronary artery disease)     pt denies    Cancer ([address]     skin on face    GERD (gastroesophageal reflux disease)     Hypertension     Morbid obesity ([address]     Prolonged emergence from general anesthesia     Seasonal allergies     Sleep apnea     uses cpap    Tubular adenoma     Type II or unspecified type diabetes mellitus without mention of complication, not stated as uncontrolled Surgical History:  Past Surgical History:   Procedure Laterality Date    ABDOMEN SURGERY      abdominal adhesion surgery on bowel    ACHILLES TENDON SURGERY Right     APPENDECTOMY      BLADDER SUSPENSION      CARDIAC DEFIBRILLATOR PLACEMENT  2012    AICD    CARPAL TUNNEL RELEASE Right 5/2/2019    CARPAL TUNNEL RELEASE performed by Jose Jensen MD at [address] [place] [address] Road COLONOSCOPY      x3    COLONOSCOPY  3/16/16    Poor prep , not optimal for patient with polyps, repeat colonoscopy in 1- 2 years , tubular adenoma     COLONOSCOPY N/A 4/4/2018    COLONOSCOPY POLYPECTOMY SNARE performed by Roberto Tolliver MD at [address] [facility]  07/24/2015    biliary sphincterotomy, pacreatic duct stent placement, it has been removed    EYE SURGERY Bilateral     cataract    HYSTERECTOMY      KNEE ARTHROSCOPY Left     PACEMAKER PLACEMENT  2012    AICD, had ablation first    SKIN BIOPSY      nose    UPPER GASTROINTESTINAL ENDOSCOPY  7 23 15    bx       Family History:      Problem Relation Age of Onset    Cancer Father     Cancer Maternal Uncle     Cancer Paternal Uncle         colon    Cancer Paternal Uncle         lung    Cancer Paternal Uncle         pancreas    Cancer Paternal Uncle         pancreas    Cancer Paternal Uncle         pancreas    Cancer Paternal Uncle     Cancer Maternal Uncle     Cancer Maternal Uncle     Heart Disease Mother     Heart Disease Maternal Grandmother     Heart Disease Maternal Grandfather     Heart Disease Paternal Grandmother        Social History:   Social History     Tobacco Use    Smoking status: Never Smoker    Smokeless tobacco: Never Used   Substance Use Topics    Alcohol use: No    Drug use: No       Current Med List:  Current Outpatient Medications   Medication Sig Dispense Refill    Insulin Degludec (TRESIBA FLEXTOUCH) 100 UNIT/ML SOPN Inject 33 Units into the skin 2 times daily      loratadine (CLARITIN) 10 MG capsule Take 10 mg by mouth daily      sotalol (BETAPACE) 80 MG tablet TAKE 1 TABLET BY MOUTH 2 TIMES DAILY 180 tablet 1    pantoprazole (PROTONIX) 40 MG tablet Take 1 tablet by mouth daily 90 tablet 1    amLODIPine (NORVASC) 5 MG tablet TAKE 1 TABLET BY MOUTH EVERY DAY 90 tablet 1    budesonide-formoterol (SYMBICORT) 160-4.5 MCG/ACT AERO TAKE 2 PUFFS BY MOUTH TWICE A DAY 1 Inhaler 5    valsartan-hydrochlorothiazide (DIOVAN-HCT) 320-12.5 MG per tablet Take 1 tablet by mouth daily 90 tablet 1    dapagliflozin (FARXIGA) 5 MG tablet Take 1 tablet by mouth every morning 30 tablet 5    albuterol (PROVENTIL) (2.5 MG/3ML) 0.083% nebulizer solution Take 3 mLs by nebulization every 6 hours as needed for Wheezing 120 each 3    albuterol sulfate HFA (PROAIR HFA) 108 (90 Base) MCG/ACT inhaler Inhale 2 puffs into the lungs every 6 hours as needed for Wheezing 1 Inhaler 3    Insulin Pen Needle 32G X 4 MM MISC 1 each by Does not apply route daily 90 each 3    aspirin 325 MG tablet Take 325 mg by mouth every other day DUe 12/12/15       No current facility-administered medications for this visit. Allergies   Allergen Reactions    Demerol Hcl [Meperidine]     Lipitor [Atorvastatin]      General aching  General aching    Metformin And Related        SOCIAL:      This patient is alone for the evaluation today.       [] HIV Risk Factors (i.e.) intravenous drug abuser; at risk sexual behavior; received blood products    [] TB Risk Factors (i.e.) Medically underserved, institutional care, foreign born, endemic area; exposure to active case    [] Hepatitis B&C Risk Factors (i.e.) Received blood transfusion prior to 1992; recreational drug use; high risk sexual behaviors; tattoos or body piercings; contact with blood or needle sticks in the workplace    Comprehension    Ability to grasp concepts and respond to questions:   [x] High   [] Medium   [] Low    Motivation    [x] Asks Questions; eager to learn   [] Needs education [] Extreme anxiety    [] uncooperative   [] Denies need for education    English Speaking Ability    [x] Speaks English well   [x] Reads English well   [x] Understands spoken english    [] Understands written English   [] No need for interpretive support      [] Might benefit from interpretive support   []  required for all services     REVIEW OF SYSTEMS: (Negative unless marked otherwise)       Do you or have you had any of the following?   Cardiovascular YES NO Respiratory YES NO   High Blood Pressure   [x]   [] COPD   []   []   Heart Attack   []   [] TB/Positive skin Test   []   []   Congestive Heart Failure   []   [] Obstructive Sleep Apnea   [x]   []   Coronary Artery Disease   [x]   [] Asthma   [x]   []   Circulation Problems   []   []      Activity Intolerance   [x]   [] Gastrointestinal YES NO   Peripheral Vascular Disease   []   [] Gastric Problems   [x]   []        Colorectal problems   [x]   []   Hematological YES NO Ulcer disease   []   []   Bleeding Tendencies   []   [] Liver disease   []   []   Blood Transfusion last 30d   []   [] Gallstones   []   []   Anemia   []   [] Refulx or Heartburn   [x]   []   Blood Clots   []   []      High Cholesterol   []   [] Muscoloskeletal YES NO   High Triglycerides   []   [] Joint Limitations   [x]   []      Muscle Weakness   []   []   Eyes, Ears, Nose, Throat YES NO Multiple Sclerosis   []   []   Cataracts   []   [] Arthritis   [x]   []   Glasses   []   []      Blurred Vision   []   [] Cancer   []   []   Hearing Aids   []   [] Type:     Ringing in Ears   []   []      Difficulty Swallowing   []   [] Encodrine YES NO      Diabetes   [x]   []   Neurological YES NO Thyroid   []   []   Stroke   []   []      Seizure   []   [] Psychiatric Disorder YES NO   Dizziness/Blackouts/Fainting   []   [] Depression   []   []   Memory Impairement   []   [] Bipolar   []   []   Parkinson's   []   [] Anxiety disorder   []   []           Genitourinary/Gyn YES NO Skin Intact   [x]

## 2020-02-18 ENCOUNTER — HOSPITAL ENCOUNTER (OUTPATIENT)
Facility: CLINIC | Age: 65
Discharge: HOME | End: 2020-02-20
Payer: COMMERCIAL

## 2020-02-18 ENCOUNTER — HOSPITAL ENCOUNTER (OUTPATIENT)
Dept: GENERAL RADIOLOGY | Facility: CLINIC | Age: 65
Discharge: HOME | End: 2020-02-20
Payer: COMMERCIAL

## 2020-02-18 PROCEDURE — 71046 X-RAY EXAM CHEST 2 VIEWS: CPT

## 2020-02-19 ENCOUNTER — TELEPHONE (OUTPATIENT)
Dept: BARIATRICS/WEIGHT MGMT | Age: 65
End: 2020-02-19

## 2020-03-25 PROBLEM — K21.9 GERD (GASTROESOPHAGEAL REFLUX DISEASE): Status: RESOLVED | Noted: 2020-03-25 | Resolved: 2020-03-24

## 2020-07-15 ENCOUNTER — HOSPITAL ENCOUNTER (OUTPATIENT)
Dept: WOMENS IMAGING | Age: 65
Discharge: HOME | End: 2020-07-17
Payer: COMMERCIAL

## 2020-07-15 PROCEDURE — 77063 BREAST TOMOSYNTHESIS BI: CPT

## 2020-07-23 ENCOUNTER — HOSPITAL ENCOUNTER (OUTPATIENT)
Dept: WOMENS IMAGING | Age: 65
End: 2020-07-23
Payer: COMMERCIAL

## 2020-07-23 ENCOUNTER — HOSPITAL ENCOUNTER (OUTPATIENT)
Dept: WOMENS IMAGING | Age: 65
Discharge: HOME | End: 2020-07-25
Payer: COMMERCIAL

## 2020-07-23 PROCEDURE — G0279 TOMOSYNTHESIS, MAMMO: HCPCS

## 2020-09-16 ENCOUNTER — HOSPITAL ENCOUNTER (OUTPATIENT)
Age: 65
Setting detail: SPECIMEN
Discharge: HOME | End: 2020-09-16
Payer: COMMERCIAL

## 2020-09-17 LAB
CREATININE URINE: 27.8 MG/DL (ref 28–217)
MICROALBUMIN/CREAT 24H UR: <12 MG/L
MICROALBUMIN/CREAT UR-RTO: ABNORMAL MCG/MG CREAT

## 2020-10-01 ENCOUNTER — HOSPITAL ENCOUNTER (OUTPATIENT)
Dept: WOMENS IMAGING | Age: 65
Discharge: HOME | End: 2020-10-03
Payer: COMMERCIAL

## 2020-10-01 PROCEDURE — 77080 DXA BONE DENSITY AXIAL: CPT

## 2020-10-12 ENCOUNTER — HOSPITAL ENCOUNTER (OUTPATIENT)
Dept: NON INVASIVE DIAGNOSTICS | Age: 65
Discharge: HOME | End: 2020-10-12
Payer: COMMERCIAL

## 2020-10-12 LAB
LV EF: 63 %
LVEF MODALITY: NORMAL

## 2020-10-12 PROCEDURE — C8929 TTE W OR WO FOL WCON,DOPPLER: HCPCS

## 2020-10-12 PROCEDURE — 6360000004 HC RX CONTRAST MEDICATION: Performed by: NURSE PRACTITIONER

## 2020-10-12 RX ADMIN — PERFLUTREN 2.2 MG: 6.52 INJECTION, SUSPENSION INTRAVENOUS at 09:42

## 2020-10-16 ENCOUNTER — HOSPITAL ENCOUNTER (OUTPATIENT)
Dept: PREADMISSION TESTING | Age: 65
Setting detail: SPECIMEN
Discharge: HOME | End: 2020-10-20
Payer: COMMERCIAL

## 2020-10-16 PROCEDURE — U0003 INFECTIOUS AGENT DETECTION BY NUCLEIC ACID (DNA OR RNA); SEVERE ACUTE RESPIRATORY SYNDROME CORONAVIRUS 2 (SARS-COV-2) (CORONAVIRUS DISEASE [COVID-19]), AMPLIFIED PROBE TECHNIQUE, MAKING USE OF HIGH THROUGHPUT TECHNOLOGIES AS DESCRIBED BY CMS-2020-01-R: HCPCS

## 2020-10-18 LAB — SARS-COV-2, NAA: NOT DETECTED

## 2020-10-19 ENCOUNTER — TELEPHONE (OUTPATIENT)
Dept: PRIMARY CARE CLINIC | Age: 65
End: 2020-10-19

## 2020-10-20 ENCOUNTER — HOSPITAL ENCOUNTER (OUTPATIENT)
Age: 65
Setting detail: OUTPATIENT SURGERY
Discharge: HOME | End: 2020-10-20
Attending: SURGERY | Admitting: SURGERY
Payer: COMMERCIAL

## 2020-10-20 VITALS
HEART RATE: 68 BPM | SYSTOLIC BLOOD PRESSURE: 142 MMHG | HEIGHT: 62 IN | OXYGEN SATURATION: 92 % | BODY MASS INDEX: 48.58 KG/M2 | RESPIRATION RATE: 14 BRPM | DIASTOLIC BLOOD PRESSURE: 61 MMHG | TEMPERATURE: 97.9 F | WEIGHT: 264 LBS

## 2020-10-20 LAB — GLUCOSE BLD-MCNC: 182 MG/DL (ref 65–105)

## 2020-10-20 PROCEDURE — 88304 TISSUE EXAM BY PATHOLOGIST: CPT

## 2020-10-20 PROCEDURE — 3600000002 HC SURGERY LEVEL 2 BASE: Performed by: SURGERY

## 2020-10-20 PROCEDURE — 3600000012 HC SURGERY LEVEL 2 ADDTL 15MIN: Performed by: SURGERY

## 2020-10-20 PROCEDURE — 82947 ASSAY GLUCOSE BLOOD QUANT: CPT

## 2020-10-20 PROCEDURE — 2709999900 HC NON-CHARGEABLE SUPPLY: Performed by: SURGERY

## 2020-10-20 PROCEDURE — 7100000010 HC PHASE II RECOVERY - FIRST 15 MIN: Performed by: SURGERY

## 2020-10-20 PROCEDURE — 99999 PR OFFICE/OUTPT VISIT,PROCEDURE ONLY: CPT | Performed by: PHYSICIAN ASSISTANT

## 2020-10-20 PROCEDURE — 2500000003 HC RX 250 WO HCPCS: Performed by: SURGERY

## 2020-10-20 RX ORDER — BUPIVACAINE HYDROCHLORIDE 5 MG/ML
INJECTION, SOLUTION EPIDURAL; INTRACAUDAL PRN
Status: DISCONTINUED | OUTPATIENT
Start: 2020-10-20 | End: 2020-10-20 | Stop reason: ALTCHOICE

## 2020-10-20 RX ORDER — ONDANSETRON 4 MG/1
TABLET, FILM COATED ORAL
Qty: 20 TABLET | Refills: 0 | Status: SHIPPED | OUTPATIENT
Start: 2020-10-20 | End: 2021-02-23

## 2020-10-20 RX ORDER — CEPHALEXIN 500 MG/1
CAPSULE ORAL
Qty: 21 CAPSULE | Refills: 0 | Status: SHIPPED | OUTPATIENT
Start: 2020-10-20 | End: 2021-02-23

## 2020-10-20 ASSESSMENT — PAIN SCALES - GENERAL: PAINLEVEL_OUTOF10: 0

## 2020-10-20 ASSESSMENT — PAIN - FUNCTIONAL ASSESSMENT: PAIN_FUNCTIONAL_ASSESSMENT: 0-10

## 2020-10-20 NOTE — H&P
HISTORY and [address]       NAME:  Katelyn Ervin  MRN: [id number]   YOB: 1955   Date: 10/20/2020   Age: 76 y.o. Gender: female       COMPLAINT AND PRESENT HISTORY:     Katelyn Ervin is 76 y.o.,  female, here for left forearm lipomas with scheduled excision. The symptoms started 1-2 years ago. Reports starting to feel discomfort in left wrist and hand as a result. Pt c/o pain to left wrist and radiating into left thumb. Rating pain 10/10 if she is using her left hand. Pain is worse with any use. Taking acetaminophen with no relief of symptoms. Pt reports decreased  strength to left hand. Denies numbness and tingling. No redness, swelling or rashes. Denies chest pain/pressure, palpitations, SOB, recent URI, N/V/D or constipation, fever or chills. PAST MEDICAL HISTORY     Past Medical History:   Diagnosis Date    Anesthesia     severe breathing issues(stopped breathing) after EGD    Asthma     Atrial fibrillation ([address]     had ablation and pacemaker/defibrillator.      CAD (coronary artery disease)     pt denies    Cancer ([address]     skin on face    GERD (gastroesophageal reflux disease)     Hypertension     Morbid obesity ([address]     Prolonged emergence from general anesthesia     Seasonal allergies     Sleep apnea     uses cpap    Tubular adenoma     Type II or unspecified type diabetes mellitus without mention of complication, not stated as uncontrolled        SURGICAL HISTORY       Past Surgical History:   Procedure Laterality Date    ABDOMEN SURGERY      abdominal adhesion surgery on bowel    ACHILLES TENDON SURGERY Right     APPENDECTOMY      BLADDER SUSPENSION      CARDIAC DEFIBRILLATOR PLACEMENT  2012    AICD    CARPAL TUNNEL RELEASE Right 5/2/2019    CARPAL TUNNEL RELEASE performed by Kirby Reed MD at [address] [place] [address] Road COLONOSCOPY      x3    COLONOSCOPY  3/16/16    Poor prep , not optimal for patient with polyps, repeat colonoscopy in 1- 2 years , tubular adenoma     COLONOSCOPY N/A 4/4/2018    COLONOSCOPY POLYPECTOMY SNARE performed by Roman Groves MD at [facility]  07/24/2015    biliary sphincterotomy, pacreatic duct stent placement, it has been removed    EYE SURGERY Bilateral     cataract    HYSTERECTOMY      KNEE ARTHROSCOPY Left     PACEMAKER PLACEMENT  2012    AICD, had ablation first    SKIN BIOPSY      nose    UPPER GASTROINTESTINAL ENDOSCOPY  7 23 15    bx       FAMILY HISTORY       Family History   Problem Relation Age of Onset    Cancer Father     Cancer Maternal Uncle     Cancer Paternal Uncle         colon    Cancer Paternal Uncle         lung    Cancer Paternal Uncle         pancreas    Cancer Paternal Uncle         pancreas    Cancer Paternal Uncle         pancreas    Cancer Paternal Uncle     Cancer Maternal Uncle     Cancer Maternal Uncle     Heart Disease Mother     Heart Disease Maternal Grandmother     Heart Disease Maternal Grandfather     Heart Disease Paternal Grandmother        SOCIAL HISTORY       Social History     Socioeconomic History    Marital status:      Spouse name: Not on file    Number of children: Not on file    Years of education: Not on file    Highest education level: Not on file   Occupational History    Not on file   Social Needs    Financial resource strain: Not on file    Food insecurity     Worry: Not on file     Inability: Not on file    Transportation needs     Medical: Not on file     Non-medical: Not on file   Tobacco Use    Smoking status: Never Smoker    Smokeless tobacco: Never Used   Substance and Sexual Activity    Alcohol use: No    Drug use: No    Sexual activity: Not on file   Lifestyle    Physical activity     Days per week: Not on file     Minutes per session: Not on file    Stress: Not on file   Relationships    Social connections     Talks on phone: Not on file     Gets together: Not on file     Attends Muslim service: Not on file     Active member of club or organization: Not on file     Attends meetings of clubs or organizations: Not on file     Relationship status: Not on file    Intimate partner violence     Fear of current or ex partner: Not on file     Emotionally abused: Not on file     Physically abused: Not on file     Forced sexual activity: Not on file   Other Topics Concern    Not on file   Social History Narrative    Not on file        REVIEW OF SYSTEMS      Allergies   Allergen Reactions    Demerol Hcl [Meperidine]     Lipitor [Atorvastatin]      General aching  General aching    Metformin And Related        No current facility-administered medications on file prior to encounter. Current Outpatient Medications on File Prior to Encounter   Medication Sig Dispense Refill    SYMBICORT 160-4.5 MCG/ACT AERO TAKE 2 PUFFS BY MOUTH TWICE A DAY 30.6 Inhaler 1    valsartan-hydrochlorothiazide (DIOVAN-HCT) 320-12.5 MG per tablet TAKE 1 TABLET BY MOUTH EVERY DAY 90 tablet 1    dapagliflozin (FARXIGA) 10 MG tablet Take 1 tablet by mouth every morning 90 tablet 1    amLODIPine (NORVASC) 5 MG tablet Take 1 tab po daily 90 tablet 1    albuterol sulfate  (90 Base) MCG/ACT inhaler TAKE 2 PUFFS BY MOUTH EVERY 6 HOURS AS NEEDED FOR WHEEZE 6.7 Inhaler 3    albuterol (PROVENTIL) (2.5 MG/3ML) 0.083% nebulizer solution Take 3 mLs by nebulization every 6 hours as needed for Wheezing 120 each 3    Insulin Pen Needle 32G X 4 MM MISC 1 each by Does not apply route daily 90 each 3    sotalol (BETAPACE) 80 MG tablet TAKE 1 TABLET BY MOUTH 2 TIMES DAILY 180 tablet 1    loratadine (CLARITIN) 10 MG capsule Take 1 capsule by mouth daily 30 capsule 3    aspirin 325 MG tablet Take 1 tablet by mouth every other day DUe 12/12/15 30 tablet 5       Negative except for what is mentioned in the HPI. GENERAL PHYSICAL EXAM     Vitals: Review vitals per RN flowsheet.      GENERAL APPEARANCE: Ciara Hughes is 76 y.o.,  female, moderately obese, nourished, conscious, alert. Does not appear to be in distress or pain at this time. SKIN:  Left forearm lipomas x 3 with intact overlying skin, no erythema or drainage. Warm, dry, no cyanosis or jaundice. HEAD:  Normocephalic, atraumatic. EYES:  Pupils equal, reactive to light. EARS:  No discharge, no marked hearing loss. NOSE:  No rhinorrhea, epistaxis or septal deformity. THROAT:  Not congested. No ulceration bleeding or discharge. NECK:  No stiffness, trachea central.  No palpable masses or L.N.                 CHEST:  Symmetrical and equal on expansion. HEART:  RRR S1 > S2. No audible murmurs or gallops. LUNGS:  Equal on expansion, normal breath sounds. No wheezing, rhonchi or rales. ABDOMEN:  Obese. Soft on palpation. No localized tenderness. No guarding or rigidity. LYMPHATICS:  No palpable cervical lymphadenopathy. LOCOMOTOR, BACK AND SPINE:  No tenderness or deformities. EXTREMITIES:  Symmetrical, no pretibial edema. No calf tenderness. No discoloration or ulcerations. NEUROLOGIC:  The patient is conscious, alert, oriented. No facial drooping. Speech clear. No apparent focal sensory or motor deficits.              PROVISIONAL DIAGNOSES / SURGERY:      LIPOMAS LEFT FOREARM    EXCISION LIPOMA FOREARM X 3  Left    Patient Active Problem List    Diagnosis Date Noted    Rectal bleeding 03/15/2018    Gastroesophageal reflux disease without esophagitis 04/11/2016    Tubular adenoma     Asthma 12/02/2015    Obstructive sleep apnea syndrome 12/02/2015    Abdominal pain 07/22/2015    Nausea 07/22/2015    Diarrhea 07/22/2015    Hypertriglyceridemia 07/22/2015    VIVIANA on CPAP 07/22/2015    RUQ abdominal pain     Liver cyst 03/27/2013    Colon polyps

## 2020-10-22 LAB — SURGICAL PATHOLOGY REPORT: NORMAL

## 2020-12-09 ENCOUNTER — HOSPITAL ENCOUNTER (OUTPATIENT)
Age: 65
Setting detail: SPECIMEN
Discharge: HOME | End: 2020-12-09
Payer: COMMERCIAL

## 2020-12-09 LAB
ABSOLUTE EOS #: 0.19 K/UL (ref 0–0.44)
ABSOLUTE IMMATURE GRANULOCYTE: 0.03 K/UL (ref 0–0.3)
ABSOLUTE LYMPH #: 2 K/UL (ref 1.1–3.7)
ABSOLUTE MONO #: 0.36 K/UL (ref 0.1–1.2)
ALBUMIN SERPL-MCNC: 3.8 G/DL (ref 3.5–5.2)
ALBUMIN/GLOBULIN RATIO: 1.1 (ref 1–2.5)
ALP BLD-CCNC: 57 U/L (ref 35–104)
ALT SERPL-CCNC: 32 U/L (ref 5–33)
ANION GAP SERPL CALCULATED.3IONS-SCNC: 9 MMOL/L (ref 9–17)
AST SERPL-CCNC: 28 U/L
BASOPHILS # BLD: 1 % (ref 0–2)
BASOPHILS ABSOLUTE: 0.04 K/UL (ref 0–0.2)
BILIRUB SERPL-MCNC: 0.39 MG/DL (ref 0.3–1.2)
BUN BLDV-MCNC: 18 MG/DL (ref 8–23)
BUN/CREAT BLD: ABNORMAL (ref 9–20)
CALCIUM SERPL-MCNC: 9.4 MG/DL (ref 8.6–10.4)
CHLORIDE BLD-SCNC: 101 MMOL/L (ref 98–107)
CHOLESTEROL, FASTING: 171 MG/DL
CHOLESTEROL/HDL RATIO: 4
CO2: 31 MMOL/L (ref 20–31)
CREAT SERPL-MCNC: 0.68 MG/DL (ref 0.5–0.9)
DIFFERENTIAL TYPE: ABNORMAL
EOSINOPHILS RELATIVE PERCENT: 3 % (ref 1–4)
GFR AFRICAN AMERICAN: >60 ML/MIN
GFR NON-AFRICAN AMERICAN: >60 ML/MIN
GFR SERPL CREATININE-BSD FRML MDRD: ABNORMAL ML/MIN/{1.73_M2}
GFR SERPL CREATININE-BSD FRML MDRD: ABNORMAL ML/MIN/{1.73_M2}
GLUCOSE BLD-MCNC: 148 MG/DL (ref 70–99)
HCT VFR BLD CALC: 52.6 % (ref 36.3–47.1)
HDLC SERPL-MCNC: 43 MG/DL
HEMOGLOBIN: 15.8 G/DL (ref 11.9–15.1)
IMMATURE GRANULOCYTES: 1 %
LDL CHOLESTEROL: 94 MG/DL (ref 0–130)
LYMPHOCYTES # BLD: 33 % (ref 24–43)
MCH RBC QN AUTO: 27.2 PG (ref 25.2–33.5)
MCHC RBC AUTO-ENTMCNC: 30 G/DL (ref 28.4–34.8)
MCV RBC AUTO: 90.5 FL (ref 82.6–102.9)
MONOCYTES # BLD: 6 % (ref 3–12)
NRBC AUTOMATED: 0 PER 100 WBC
PDW BLD-RTO: 16.1 % (ref 11.8–14.4)
PLATELET # BLD: 146 K/UL (ref 138–453)
PLATELET ESTIMATE: ABNORMAL
PMV BLD AUTO: 12.5 FL (ref 8.1–13.5)
POTASSIUM SERPL-SCNC: 4.7 MMOL/L (ref 3.7–5.3)
RBC # BLD: 5.81 M/UL (ref 3.95–5.11)
RBC # BLD: ABNORMAL 10*6/UL
SEG NEUTROPHILS: 56 % (ref 36–65)
SEGMENTED NEUTROPHILS ABSOLUTE COUNT: 3.54 K/UL (ref 1.5–8.1)
SODIUM BLD-SCNC: 141 MMOL/L (ref 135–144)
TOTAL PROTEIN: 7.2 G/DL (ref 6.4–8.3)
TRIGLYCERIDE, FASTING: 169 MG/DL
VLDLC SERPL CALC-MCNC: ABNORMAL MG/DL (ref 1–30)
WBC # BLD: 6.2 K/UL (ref 3.5–11.3)
WBC # BLD: ABNORMAL 10*3/UL

## 2021-02-23 ENCOUNTER — HOSPITAL ENCOUNTER (OUTPATIENT)
Age: 66
Setting detail: SPECIMEN
Discharge: HOME | End: 2021-02-23
Payer: COMMERCIAL

## 2021-02-23 ENCOUNTER — HOSPITAL ENCOUNTER (OUTPATIENT)
Facility: CLINIC | Age: 66
Discharge: HOME | End: 2021-02-25
Payer: COMMERCIAL

## 2021-02-23 ENCOUNTER — HOSPITAL ENCOUNTER (OUTPATIENT)
Dept: GENERAL RADIOLOGY | Facility: CLINIC | Age: 66
Discharge: HOME | End: 2021-02-25
Payer: COMMERCIAL

## 2021-02-23 DIAGNOSIS — R22.32 NODULE OF SKIN OF LEFT HAND: ICD-10-CM

## 2021-02-23 PROCEDURE — 73120 X-RAY EXAM OF HAND: CPT

## 2021-02-24 DIAGNOSIS — N30.01 ACUTE CYSTITIS WITH HEMATURIA: ICD-10-CM

## 2021-02-26 LAB
CULTURE: ABNORMAL
CULTURE: ABNORMAL
Lab: ABNORMAL
SPECIMEN DESCRIPTION: ABNORMAL

## 2021-03-02 ENCOUNTER — HOSPITAL ENCOUNTER (OUTPATIENT)
Dept: CT IMAGING | Age: 66
Discharge: HOME | End: 2021-03-04
Payer: COMMERCIAL

## 2021-03-02 DIAGNOSIS — R22.32 MASS OF LEFT HAND: ICD-10-CM

## 2021-03-02 LAB
BUN BLDV-MCNC: 23 MG/DL (ref 8–23)
CREAT SERPL-MCNC: 1.21 MG/DL (ref 0.5–0.9)
GFR AFRICAN AMERICAN: 54 ML/MIN
GFR NON-AFRICAN AMERICAN: 45 ML/MIN
GFR SERPL CREATININE-BSD FRML MDRD: ABNORMAL ML/MIN/{1.73_M2}
GFR SERPL CREATININE-BSD FRML MDRD: ABNORMAL ML/MIN/{1.73_M2}

## 2021-03-02 PROCEDURE — 6360000004 HC RX CONTRAST MEDICATION: Performed by: PLASTIC SURGERY

## 2021-03-02 PROCEDURE — 2580000003 HC RX 258: Performed by: PLASTIC SURGERY

## 2021-03-02 PROCEDURE — 73201 CT UPPER EXTREMITY W/DYE: CPT

## 2021-03-02 PROCEDURE — 82565 ASSAY OF CREATININE: CPT

## 2021-03-02 PROCEDURE — 36415 COLL VENOUS BLD VENIPUNCTURE: CPT

## 2021-03-02 PROCEDURE — 84520 ASSAY OF UREA NITROGEN: CPT

## 2021-03-02 RX ORDER — SODIUM CHLORIDE 0.9 % (FLUSH) 0.9 %
10 SYRINGE (ML) INJECTION PRN
Status: DISCONTINUED | OUTPATIENT
Start: 2021-03-02 | End: 2021-03-05 | Stop reason: HOSPADM

## 2021-03-02 RX ORDER — 0.9 % SODIUM CHLORIDE 0.9 %
80 INTRAVENOUS SOLUTION INTRAVENOUS ONCE
Status: COMPLETED | OUTPATIENT
Start: 2021-03-02 | End: 2021-03-02

## 2021-03-02 RX ADMIN — Medication 10 ML: at 18:00

## 2021-03-02 RX ADMIN — SODIUM CHLORIDE 80 ML: 9 INJECTION, SOLUTION INTRAVENOUS at 18:00

## 2021-03-02 RX ADMIN — IOPAMIDOL 75 ML: 755 INJECTION, SOLUTION INTRAVENOUS at 17:59

## 2021-03-16 NOTE — PROGRESS NOTES
Pt came in for emg/ncv testing today but told Dr. Yevgeniy Hogan that she had a pacemaker and defibrillator. We called the cardiologist, Dr. Marilin Fernandez, but he was not in, so Dr. Deidre Cornelius said that the patient would need to have a magnet placed over the defibrillator for the testing. We do not have this device. Patient was informed that appt will need to be postponed and the rep for her [employer] defib. Will need to be present to monitor the patient during testing. I will call [employer] to find the rep and ask to be here for her rs appt on [phone]. Pt understood and if there are any problems with getting a rep here I will contact her in advance. Cheiloplasty (Complex) Text: A decision was made to reconstruct the defect with a  cheiloplasty.  The defect was undermined extensively.  Additional obicularis oris muscle was excised with a 15 blade scalpel.  The defect was converted into a full thickness wedge to facilite a better cosmetic result.  Small vessels were then tied off with 5-0 monocyrl. The obicularis oris, superficial fascia, adipose and dermis were then reapproximated.  After the deeper layers were approximated the epidermis was reapproximated with particular care given to realign the vermilion border.

## 2021-03-22 ENCOUNTER — HOSPITAL ENCOUNTER (OUTPATIENT)
Dept: GENERAL RADIOLOGY | Facility: CLINIC | Age: 66
Discharge: HOME | End: 2021-03-24
Payer: COMMERCIAL

## 2021-03-22 ENCOUNTER — HOSPITAL ENCOUNTER (OUTPATIENT)
Age: 66
Setting detail: SPECIMEN
Discharge: HOME | End: 2021-03-22
Payer: COMMERCIAL

## 2021-03-22 ENCOUNTER — HOSPITAL ENCOUNTER (OUTPATIENT)
Facility: CLINIC | Age: 66
Discharge: HOME | End: 2021-03-24
Payer: COMMERCIAL

## 2021-03-22 DIAGNOSIS — M79.675 GREAT TOE PAIN, LEFT: ICD-10-CM

## 2021-03-22 LAB
ANION GAP SERPL CALCULATED.3IONS-SCNC: 12 MMOL/L (ref 9–17)
BUN BLDV-MCNC: 23 MG/DL (ref 8–23)
BUN/CREAT BLD: ABNORMAL (ref 9–20)
CALCIUM SERPL-MCNC: 8.8 MG/DL (ref 8.6–10.4)
CHLORIDE BLD-SCNC: 100 MMOL/L (ref 98–107)
CO2: 27 MMOL/L (ref 20–31)
CREAT SERPL-MCNC: 0.68 MG/DL (ref 0.5–0.9)
GFR AFRICAN AMERICAN: >60 ML/MIN
GFR NON-AFRICAN AMERICAN: >60 ML/MIN
GFR SERPL CREATININE-BSD FRML MDRD: ABNORMAL ML/MIN/{1.73_M2}
GFR SERPL CREATININE-BSD FRML MDRD: ABNORMAL ML/MIN/{1.73_M2}
GLUCOSE BLD-MCNC: 230 MG/DL (ref 70–99)
POTASSIUM SERPL-SCNC: 4.6 MMOL/L (ref 3.7–5.3)
SODIUM BLD-SCNC: 139 MMOL/L (ref 135–144)
URIC ACID: 7 MG/DL (ref 2.4–5.7)

## 2021-03-22 PROCEDURE — 73630 X-RAY EXAM OF FOOT: CPT

## 2021-04-27 ENCOUNTER — HOSPITAL ENCOUNTER (EMERGENCY)
Age: 66
Discharge: HOME | End: 2021-04-28
Attending: EMERGENCY MEDICINE
Payer: COMMERCIAL

## 2021-04-27 DIAGNOSIS — R10.9 ABDOMINAL PAIN, UNSPECIFIED ABDOMINAL LOCATION: Primary | ICD-10-CM

## 2021-04-27 PROCEDURE — 99285 EMERGENCY DEPT VISIT HI MDM: CPT

## 2021-04-27 PROCEDURE — 96374 THER/PROPH/DIAG INJ IV PUSH: CPT

## 2021-04-27 PROCEDURE — 96375 TX/PRO/DX INJ NEW DRUG ADDON: CPT

## 2021-04-27 ASSESSMENT — PAIN SCALES - GENERAL: PAINLEVEL_OUTOF10: 9

## 2021-04-28 ENCOUNTER — APPOINTMENT (OUTPATIENT)
Dept: CT IMAGING | Age: 66
End: 2021-04-28
Payer: COMMERCIAL

## 2021-04-28 ENCOUNTER — APPOINTMENT (OUTPATIENT)
Dept: GENERAL RADIOLOGY | Age: 66
End: 2021-04-28
Payer: COMMERCIAL

## 2021-04-28 VITALS
HEIGHT: 62 IN | DIASTOLIC BLOOD PRESSURE: 55 MMHG | BODY MASS INDEX: 48.4 KG/M2 | RESPIRATION RATE: 22 BRPM | OXYGEN SATURATION: 97 % | HEART RATE: 63 BPM | SYSTOLIC BLOOD PRESSURE: 101 MMHG | TEMPERATURE: 98.8 F | WEIGHT: 263 LBS

## 2021-04-28 LAB
ABSOLUTE EOS #: 0.1 K/UL (ref 0–0.4)
ABSOLUTE IMMATURE GRANULOCYTE: ABNORMAL K/UL (ref 0–0.3)
ABSOLUTE LYMPH #: 1.7 K/UL (ref 1–4.8)
ABSOLUTE MONO #: 0.5 K/UL (ref 0.1–1.3)
ALBUMIN SERPL-MCNC: 3.6 G/DL (ref 3.5–5.2)
ALBUMIN/GLOBULIN RATIO: ABNORMAL (ref 1–2.5)
ALP BLD-CCNC: 63 U/L (ref 35–104)
ALT SERPL-CCNC: 31 U/L (ref 5–33)
ANION GAP SERPL CALCULATED.3IONS-SCNC: 12 MMOL/L (ref 9–17)
AST SERPL-CCNC: 25 U/L
BASOPHILS # BLD: 1 % (ref 0–2)
BASOPHILS ABSOLUTE: 0.1 K/UL (ref 0–0.2)
BILIRUB SERPL-MCNC: 0.26 MG/DL (ref 0.3–1.2)
BILIRUBIN DIRECT: 0.09 MG/DL
BILIRUBIN, INDIRECT: 0.17 MG/DL (ref 0–1)
BNP INTERPRETATION: NORMAL
BUN BLDV-MCNC: 17 MG/DL (ref 8–23)
BUN/CREAT BLD: ABNORMAL (ref 9–20)
CALCIUM SERPL-MCNC: 9.9 MG/DL (ref 8.6–10.4)
CHLORIDE BLD-SCNC: 99 MMOL/L (ref 98–107)
CO2: 27 MMOL/L (ref 20–31)
CREAT SERPL-MCNC: 0.77 MG/DL (ref 0.5–0.9)
DIFFERENTIAL TYPE: ABNORMAL
EKG ATRIAL RATE: 67 BPM
EKG P AXIS: 48 DEGREES
EKG P-R INTERVAL: 202 MS
EKG Q-T INTERVAL: 452 MS
EKG QRS DURATION: 118 MS
EKG QTC CALCULATION (BAZETT): 477 MS
EKG R AXIS: -36 DEGREES
EKG T AXIS: 94 DEGREES
EKG VENTRICULAR RATE: 67 BPM
EOSINOPHILS RELATIVE PERCENT: 1 % (ref 0–4)
GFR AFRICAN AMERICAN: >60 ML/MIN
GFR NON-AFRICAN AMERICAN: >60 ML/MIN
GFR SERPL CREATININE-BSD FRML MDRD: ABNORMAL ML/MIN/{1.73_M2}
GFR SERPL CREATININE-BSD FRML MDRD: ABNORMAL ML/MIN/{1.73_M2}
GLOBULIN: ABNORMAL G/DL (ref 1.5–3.8)
GLUCOSE BLD-MCNC: 260 MG/DL (ref 70–99)
HCT VFR BLD CALC: 44.8 % (ref 36–46)
HEMOGLOBIN: 14.5 G/DL (ref 12–16)
IMMATURE GRANULOCYTES: ABNORMAL %
INR BLD: 1
LIPASE: 64 U/L (ref 13–60)
LYMPHOCYTES # BLD: 19 % (ref 24–44)
MAGNESIUM: 2.4 MG/DL (ref 1.6–2.6)
MCH RBC QN AUTO: 28 PG (ref 26–34)
MCHC RBC AUTO-ENTMCNC: 32.3 G/DL (ref 31–37)
MCV RBC AUTO: 86.6 FL (ref 80–100)
MONOCYTES # BLD: 6 % (ref 1–7)
NRBC AUTOMATED: ABNORMAL PER 100 WBC
PARTIAL THROMBOPLASTIN TIME: 27.2 SEC (ref 24–36)
PDW BLD-RTO: 16.1 % (ref 11.5–14.9)
PLATELET # BLD: 241 K/UL (ref 150–450)
PLATELET ESTIMATE: ABNORMAL
PMV BLD AUTO: 8.7 FL (ref 6–12)
POTASSIUM SERPL-SCNC: 4.2 MMOL/L (ref 3.7–5.3)
PRO-BNP: 232 PG/ML
PROTHROMBIN TIME: 13.6 SEC (ref 11.8–14.6)
RBC # BLD: 5.17 M/UL (ref 4–5.2)
RBC # BLD: ABNORMAL 10*6/UL
SEG NEUTROPHILS: 73 % (ref 36–66)
SEGMENTED NEUTROPHILS ABSOLUTE COUNT: 6.8 K/UL (ref 1.3–9.1)
SODIUM BLD-SCNC: 138 MMOL/L (ref 135–144)
TOTAL PROTEIN: 7.9 G/DL (ref 6.4–8.3)
TROPONIN INTERP: NORMAL
TROPONIN INTERP: NORMAL
TROPONIN T: NORMAL NG/ML
TROPONIN T: NORMAL NG/ML
TROPONIN, HIGH SENSITIVITY: 10 NG/L (ref 0–14)
TROPONIN, HIGH SENSITIVITY: 9 NG/L (ref 0–14)
WBC # BLD: 9.3 K/UL (ref 3.5–11)
WBC # BLD: ABNORMAL 10*3/UL

## 2021-04-28 PROCEDURE — 6360000004 HC RX CONTRAST MEDICATION: Performed by: EMERGENCY MEDICINE

## 2021-04-28 PROCEDURE — 85025 COMPLETE CBC W/AUTO DIFF WBC: CPT

## 2021-04-28 PROCEDURE — 93010 ELECTROCARDIOGRAM REPORT: CPT | Performed by: INTERNAL MEDICINE

## 2021-04-28 PROCEDURE — 36415 COLL VENOUS BLD VENIPUNCTURE: CPT

## 2021-04-28 PROCEDURE — 83690 ASSAY OF LIPASE: CPT

## 2021-04-28 PROCEDURE — 84484 ASSAY OF TROPONIN QUANT: CPT

## 2021-04-28 PROCEDURE — 83880 ASSAY OF NATRIURETIC PEPTIDE: CPT

## 2021-04-28 PROCEDURE — 93005 ELECTROCARDIOGRAM TRACING: CPT | Performed by: EMERGENCY MEDICINE

## 2021-04-28 PROCEDURE — 85730 THROMBOPLASTIN TIME PARTIAL: CPT

## 2021-04-28 PROCEDURE — 6360000002 HC RX W HCPCS: Performed by: EMERGENCY MEDICINE

## 2021-04-28 PROCEDURE — 85610 PROTHROMBIN TIME: CPT

## 2021-04-28 PROCEDURE — 2580000003 HC RX 258: Performed by: EMERGENCY MEDICINE

## 2021-04-28 PROCEDURE — 80048 BASIC METABOLIC PNL TOTAL CA: CPT

## 2021-04-28 PROCEDURE — 83735 ASSAY OF MAGNESIUM: CPT

## 2021-04-28 PROCEDURE — 71045 X-RAY EXAM CHEST 1 VIEW: CPT

## 2021-04-28 PROCEDURE — 74177 CT ABD & PELVIS W/CONTRAST: CPT

## 2021-04-28 PROCEDURE — 80076 HEPATIC FUNCTION PANEL: CPT

## 2021-04-28 RX ORDER — ONDANSETRON 2 MG/ML
4 INJECTION INTRAMUSCULAR; INTRAVENOUS ONCE
Status: COMPLETED | OUTPATIENT
Start: 2021-04-28 | End: 2021-04-28

## 2021-04-28 RX ORDER — MORPHINE SULFATE 4 MG/ML
4 INJECTION, SOLUTION INTRAMUSCULAR; INTRAVENOUS ONCE
Status: COMPLETED | OUTPATIENT
Start: 2021-04-28 | End: 2021-04-28

## 2021-04-28 RX ORDER — 0.9 % SODIUM CHLORIDE 0.9 %
1000 INTRAVENOUS SOLUTION INTRAVENOUS ONCE
Status: DISCONTINUED | OUTPATIENT
Start: 2021-04-28 | End: 2021-04-28

## 2021-04-28 RX ORDER — 0.9 % SODIUM CHLORIDE 0.9 %
80 INTRAVENOUS SOLUTION INTRAVENOUS ONCE
Status: COMPLETED | OUTPATIENT
Start: 2021-04-28 | End: 2021-04-28

## 2021-04-28 RX ORDER — SODIUM CHLORIDE 0.9 % (FLUSH) 0.9 %
10 SYRINGE (ML) INJECTION PRN
Status: DISCONTINUED | OUTPATIENT
Start: 2021-04-28 | End: 2021-04-28 | Stop reason: HOSPADM

## 2021-04-28 RX ADMIN — ONDANSETRON 4 MG: 2 INJECTION INTRAMUSCULAR; INTRAVENOUS at 01:43

## 2021-04-28 RX ADMIN — SODIUM CHLORIDE 80 ML: 9 INJECTION, SOLUTION INTRAVENOUS at 02:34

## 2021-04-28 RX ADMIN — IOPAMIDOL 75 ML: 755 INJECTION, SOLUTION INTRAVENOUS at 02:34

## 2021-04-28 RX ADMIN — MORPHINE SULFATE 4 MG: 4 INJECTION, SOLUTION INTRAMUSCULAR; INTRAVENOUS at 01:44

## 2021-04-28 RX ADMIN — SODIUM CHLORIDE, PRESERVATIVE FREE 10 ML: 5 INJECTION INTRAVENOUS at 02:34

## 2021-04-28 ASSESSMENT — ENCOUNTER SYMPTOMS
ABDOMINAL PAIN: 1
SHORTNESS OF BREATH: 0
DIARRHEA: 0
NAUSEA: 1
BACK PAIN: 0
VOMITING: 0
COUGH: 0

## 2021-04-28 NOTE — ED PROVIDER NOTES
EMERGENCY DEPARTMENT ENCOUNTER    Pt Name: Parmjit Long  MRN: [id number]  [place] 1955  Date of evaluation: 4/28/21  CHIEF COMPLAINT       Chief Complaint   Patient presents with    Abdominal Pain    Emesis     HISTORY OF PRESENT ILLNESS   HPI     This is a 60-year-old female with a history of heart failure with preserved ejection fraction grade 2 diastolic dysfunction status post AICD who comes in today. The patient states that she has developed right upper quadrant abdominal pain for the last 2 days nothing makes it better or worse she did try Tylenol without any improvement of her symptoms she feels nauseated without any vomiting no diarrhea. She denies any chest pain or shortness of breath says that she had a similar episode when she had pancreatitis and she had a cholecystectomy at that time. She denies any alcohol use    REVIEW OF SYSTEMS     Review of Systems   Constitutional: Negative for fever. HENT: Negative for congestion. Respiratory: Negative for cough and shortness of breath. Cardiovascular: Negative for chest pain. Gastrointestinal: Positive for abdominal pain and nausea. Negative for diarrhea and vomiting. Genitourinary: Negative for dysuria. Musculoskeletal: Negative for back pain. Skin: Negative for rash. Neurological: Negative for headaches. All other systems reviewed and are negative. PASTMEDICAL HISTORY     Past Medical History:   Diagnosis Date    Anesthesia     severe breathing issues(stopped breathing) after EGD    Asthma     Atrial fibrillation ([address]     had ablation and pacemaker/defibrillator.      CAD (coronary artery disease)     pt denies    Cancer ([address]     skin on face    GERD (gastroesophageal reflux disease)     Hypertension     Morbid obesity ([address]     Prolonged emergence from general anesthesia     Seasonal allergies     Sleep apnea     uses cpap    Tubular adenoma     Type II or unspecified type diabetes mellitus without mention of complication, not stated as uncontrolled      SURGICAL HISTORY       Past Surgical History:   Procedure Laterality Date    ABDOMEN SURGERY      abdominal adhesion surgery on bowel    ACHILLES TENDON SURGERY Right     APPENDECTOMY      ARM SURGERY Left 10/20/2020    EXCISION LIPOMA FOREARM X 3 performed by Robi Perez MD at [facility]      2012   [facility]  2012    [address] Right 5/2/2019    CARPAL TUNNEL RELEASE performed by Ivan Shone, MD at [address]      x3   Andrea Passey COLONOSCOPY      x3    COLONOSCOPY  3/16/16    Poor prep , not optimal for patient with polyps, repeat colonoscopy in 1- 2 years , tubular adenoma     COLONOSCOPY N/A 4/4/2018    COLONOSCOPY POLYPECTOMY SNARE performed by Cristobal Heimlich, MD at [address] [facility]  07/24/2015    biliary sphincterotomy, pacreatic duct stent placement, it has been removed    EYE SURGERY Bilateral     cataract    HYSTERECTOMY      KNEE ARTHROSCOPY Left     PACEMAKER PLACEMENT  2012    AICD, had ablation first    SKIN BIOPSY      nose    UPPER GASTROINTESTINAL ENDOSCOPY  7 23 15    bx     CURRENT MEDICATIONS       Previous Medications    ALBUTEROL (PROVENTIL) (2.5 MG/3ML) 0.083% NEBULIZER SOLUTION    Take 3 mLs by nebulization every 6 hours as needed for Wheezing    ALBUTEROL SULFATE  (90 BASE) MCG/ACT INHALER    TAKE 2 PUFFS BY MOUTH EVERY 6 HOURS AS NEEDED FOR WHEEZE    ALLOPURINOL (ZYLOPRIM) 300 MG TABLET    Take 1 tablet by mouth daily    AMLODIPINE (NORVASC) 5 MG TABLET    Take 1 tab po daily    AMOXICILLIN (AMOXIL) 500 MG CAPSULE    Take 1 capsule by mouth 3 times daily for 10 days    ASPIRIN 325 MG TABLET    Take 1 tablet by mouth every other day DUe 12/12/15    FARXIGA 10 MG TABLET    TAKE 1 TABLET BY MOUTH EVERY DAY IN THE MORNING    GLIPIZIDE (GLUCOTROL) 5 MG TABLET    Take 1 tablet by mouth 2 times daily INDOMETHACIN (INDOCIN) 25 MG CAPSULE    Take 1 capsule by mouth 3 times daily as needed for Pain    INSULIN PEN NEEDLE 32G X 4 MM MISC    1 each by Does not apply route daily    LORATADINE (CLARITIN) 10 MG CAPSULE    Take 1 capsule by mouth daily    MOMETASONE (NASONEX) 50 MCG/ACT NASAL SPRAY    SPRAY TWO SPRAYS IN EACH NOSTRIL ONCE DAILY    PANTOPRAZOLE (PROTONIX) 40 MG TABLET    TAKE 1 TABLET BY MOUTH EVERY DAY    SOTALOL (BETAPACE) 80 MG TABLET    TAKE 1 TABLET BY MOUTH TWICE A DAY    SYMBICORT 160-4.5 MCG/ACT AERO    TAKE 2 PUFFS BY MOUTH TWICE A DAY    VALSARTAN-HYDROCHLOROTHIAZIDE (DIOVAN-HCT) 320-12.5 MG PER TABLET    TAKE 1 TABLET BY MOUTH EVERY DAY     ALLERGIES     is allergic to demerol hcl [meperidine]; lipitor [atorvastatin]; and metformin and related. FAMILY HISTORY     She indicated that the status of her mother is unknown. She indicated that her father is . She indicated that the status of her maternal grandmother is unknown. She indicated that the status of her maternal grandfather is unknown. She indicated that the status of her paternal grandmother is unknown. She indicated that all of her three maternal uncles are . She indicated that all of her six paternal uncles are . SOCIAL HISTORY       Social History     Tobacco Use    Smoking status: Never Smoker    Smokeless tobacco: Never Used   Substance Use Topics    Alcohol use: No    Drug use: No     PHYSICAL EXAM     INITIAL VITALS: BP (!) 117/56   Pulse 64   Temp 98.8 °F (37.1 °C) (Oral)   Resp 20   Ht 5' 2\" (1.575 m)   Wt 263 lb (119.3 kg)   SpO2 96%   BMI 48.10 kg/m²    Physical Exam  Vitals signs and nursing note reviewed. Constitutional:       General: She is not in acute distress. Appearance: She is well-developed. HENT:      Head: Normocephalic and atraumatic. Eyes:      Conjunctiva/sclera: Conjunctivae normal.   Neck:      Musculoskeletal: Neck supple.    Cardiovascular:      Rate and Rhythm: Normal rate and regular rhythm. Heart sounds: No murmur. No friction rub. Pulmonary:      Effort: Pulmonary effort is normal. No respiratory distress. Breath sounds: Normal breath sounds. No stridor. No wheezing or rhonchi. Abdominal:      Palpations: Abdomen is soft. Comments: Abdominal swelling, global abdominal tenderness to palpation   Skin:     General: Skin is warm and dry. Capillary Refill: Capillary refill takes less than 2 seconds. Neurological:      Mental Status: She is alert. DIAGNOSTIC RESULTS   RADIOLOGY:All plain film, CT, MRI, and formal ultrasound images (except ED bedside ultrasound) are read by the radiologist, see reports below, unless otherwisenoted in MDM or here. XR CHEST PORTABLE   Final Result   No acute cardiopulmonary process identified. CT ABDOMEN PELVIS W IV CONTRAST Additional Contrast? None   Final Result   1. No acute findings within the abdomen or pelvis. 2. Status post cholecystectomy. 3. Pneumobilia, unchanged. 4. Enlarged, fatty liver. LABS: All lab results were reviewed by myself, and all abnormals are listed below. Labs Reviewed   CBC WITH AUTO DIFFERENTIAL - Abnormal; Notable for the following components:       Result Value    RDW 16.1 (*)     Seg Neutrophils 73 (*)     Lymphocytes 19 (*)     All other components within normal limits   BASIC METABOLIC PANEL - Abnormal; Notable for the following components:    Glucose 260 (*)     All other components within normal limits   LIPASE - Abnormal; Notable for the following components:    Lipase 64 (*)     All other components within normal limits   HEPATIC FUNCTION PANEL - Abnormal; Notable for the following components:     Total Bilirubin 0.26 (*)     All other components within normal limits   MAGNESIUM   APTT   PROTIME-INR   TROPONIN   TROPONIN   BRAIN NATRIURETIC PEPTIDE       EMERGENCY DEPARTMENTCOURSE:   Differential diagnosis includes appendicitis diverticulitis small bowel obstruction choledocholithiasis heart failure exacerbation pancreatitis.   The patient appears to have anasarca she says that when she has had heart failure exacerbations in the past it is to her abdomen that fills with fluid and not her leg she does not have lower extremity edema but she does have soft tissue edema of her abdomen concerning for anasarca will obtain a cardiac and abdominal work-up    4:48 AM EDT  There is no elevation in creatinine no electrolyte abnormality no anion gap elevation troponin is 10 repeat is 9 there is no elevation in BNP no LFT elevation she does have a lipase elevation at 64 but this is not 3 times the upper limit of normal there is no leukocytosis no anemia CT scan is negative for acute process chest x-ray is negative for acute process I did speak to the patient about these results she says that she feels much better will discharge patient and have her follow-up with her primary care doctor       EKG:All EKG's are interpreted by the Emergency Department Physician who either signs or Co-signs this chart in the absence of a cardiologist.  Atrial sensed ventricular paced rhythm with a heart rate of 67 bpm prolonged NV interval at 202 ms prolonged QTC of 477 ms left axis deviation no acute ST or T wave changes    Vitals:    Vitals:    04/27/21 2308 04/28/21 0150 04/28/21 0151 04/28/21 0315   BP: (!) 148/60 (!) 121/53  (!) 117/56   Pulse: 74 66  64   Resp: 16 18  20   Temp: 98.8 °F (37.1 °C)      TempSrc: Oral      SpO2: 93% (!) 89% 92% 96%   Weight: 263 lb (119.3 kg)      Height: 5' 2\" (1.575 m)          The patient was given the following medications while in the emergency department:  Orders Placed This Encounter   Medications    DISCONTD: 0.9 % sodium chloride bolus    ondansetron (ZOFRAN) injection 4 mg    morphine sulfate (PF) injection 4 mg    iopamidol (ISOVUE-370) 76 % injection 75 mL    0.9 % sodium chloride bolus    sodium chloride flush 0.9 % injection 10 mL FINAL IMPRESSION      1.  Abdominal pain, unspecified abdominal location         DISPOSITION/PLAN   DISPOSITION Decision To Discharge 04/28/2021 04:43:59 AM      PATIENT REFERRED TO:  Anuel Mendez, APRN - CNP  [facility]  [address] [facility] [address]  [address] [facility] [address]  [phone]    In 3 days    Destiney Bazzi MD  Attending Emergency Physician    This charting supersedes any ED resident or staff charting and was written using speech recognition software       Destiney Bazzi MD  04/28/21 [id number]

## 2021-05-12 NOTE — PROCEDURES
[address] [facility] [address]                              CARDIAC STRESS TEST    PATIENT NAME: Yolanda Cobian                     :        1955  MED REC NO:   [id number]                              ROOM:  ACCOUNT NO:   [de-identified]                           ADMIT DATE: 2019  PROVIDER:     Eusebio Kemp DO    DATE OF STUDY:  2019    ORDERING PROVIDER:  ROHIT Alford    INTERPRETING PHYSICIAN:  MD EMERSON CELESTIN STRESS TEST    INDICATION:  SHORTNESS OF BREATH    INTERPRETATION:  Resting heart rate:  68 bpm.  Resting blood pressure:  156/85 mmHg. Resting EKG:  Sinus rhythm. Left bundle branch block. Stress heart response:  Normal response. Uninterpretable ischemic EKG changes. IMPRESSION:  UNINTERPRETABLE FOR EKG CHANGES. THE NUCLEAR SCAN TO FOLLOW.       YAYA HUBBARD DO    D: 2019 11:28:39       T: 2019 11:29:52     MABLE  Job#: [id number]     Doc#: Unknown    CC:    (Retain this field even if not dictated or not decipherable) [employer]ium

## 2021-05-14 ENCOUNTER — HOSPITAL ENCOUNTER (OUTPATIENT)
Dept: CT IMAGING | Facility: CLINIC | Age: 66
Discharge: HOME | End: 2021-05-16
Payer: COMMERCIAL

## 2021-05-14 ENCOUNTER — HOSPITAL ENCOUNTER (OUTPATIENT)
Age: 66
Setting detail: SPECIMEN
Discharge: HOME | End: 2021-05-14
Payer: COMMERCIAL

## 2021-05-14 DIAGNOSIS — Z87.19 HISTORY OF PANCREATITIS: ICD-10-CM

## 2021-05-14 DIAGNOSIS — R11.2 NON-INTRACTABLE VOMITING WITH NAUSEA, UNSPECIFIED VOMITING TYPE: ICD-10-CM

## 2021-05-14 DIAGNOSIS — R63.4 WEIGHT LOSS: ICD-10-CM

## 2021-05-14 DIAGNOSIS — R63.0 APPETITE LOSS: ICD-10-CM

## 2021-05-14 DIAGNOSIS — R10.9 CONTINUOUS SEVERE ABDOMINAL PAIN: ICD-10-CM

## 2021-05-14 DIAGNOSIS — Z90.49 HISTORY OF CHOLECYSTECTOMY: ICD-10-CM

## 2021-05-14 DIAGNOSIS — R19.00 ABDOMINAL SWELLING: ICD-10-CM

## 2021-05-14 LAB
ABSOLUTE EOS #: 0.24 K/UL (ref 0–0.44)
ABSOLUTE IMMATURE GRANULOCYTE: 0.04 K/UL (ref 0–0.3)
ABSOLUTE LYMPH #: 1.8 K/UL (ref 1.1–3.7)
ABSOLUTE MONO #: 0.51 K/UL (ref 0.1–1.2)
ALBUMIN SERPL-MCNC: 3.8 G/DL (ref 3.5–5.2)
ALBUMIN/GLOBULIN RATIO: 0.9 (ref 1–2.5)
ALP BLD-CCNC: 67 U/L (ref 35–104)
ALT SERPL-CCNC: 25 U/L (ref 5–33)
AMYLASE: 68 U/L (ref 28–100)
ANION GAP SERPL CALCULATED.3IONS-SCNC: 15 MMOL/L (ref 9–17)
AST SERPL-CCNC: 31 U/L
BASOPHILS # BLD: 1 % (ref 0–2)
BASOPHILS ABSOLUTE: 0.06 K/UL (ref 0–0.2)
BILIRUB SERPL-MCNC: 0.61 MG/DL (ref 0.3–1.2)
BUN BLDV-MCNC: 16 MG/DL (ref 8–23)
BUN/CREAT BLD: ABNORMAL (ref 9–20)
CALCIUM SERPL-MCNC: 9.7 MG/DL (ref 8.6–10.4)
CHLORIDE BLD-SCNC: 94 MMOL/L (ref 98–107)
CO2: 26 MMOL/L (ref 20–31)
CREAT SERPL-MCNC: 0.72 MG/DL (ref 0.5–0.9)
DIFFERENTIAL TYPE: ABNORMAL
EOSINOPHILS RELATIVE PERCENT: 3 % (ref 1–4)
GFR AFRICAN AMERICAN: >60 ML/MIN
GFR NON-AFRICAN AMERICAN: >60 ML/MIN
GFR SERPL CREATININE-BSD FRML MDRD: ABNORMAL ML/MIN/{1.73_M2}
GFR SERPL CREATININE-BSD FRML MDRD: ABNORMAL ML/MIN/{1.73_M2}
GLUCOSE BLD-MCNC: 84 MG/DL (ref 70–99)
HCT VFR BLD CALC: 56.8 % (ref 36.3–47.1)
HEMOGLOBIN: 17.4 G/DL (ref 11.9–15.1)
IMMATURE GRANULOCYTES: 0 %
LIPASE: 119 U/L (ref 13–60)
LYMPHOCYTES # BLD: 19 % (ref 24–43)
MCH RBC QN AUTO: 27.5 PG (ref 25.2–33.5)
MCHC RBC AUTO-ENTMCNC: 30.6 G/DL (ref 28.4–34.8)
MCV RBC AUTO: 89.9 FL (ref 82.6–102.9)
MONOCYTES # BLD: 6 % (ref 3–12)
NRBC AUTOMATED: 0 PER 100 WBC
PDW BLD-RTO: 16.6 % (ref 11.8–14.4)
PLATELET # BLD: 167 K/UL (ref 138–453)
PLATELET ESTIMATE: ABNORMAL
PMV BLD AUTO: 12.4 FL (ref 8.1–13.5)
POTASSIUM SERPL-SCNC: 3.8 MMOL/L (ref 3.7–5.3)
RBC # BLD: 6.32 M/UL (ref 3.95–5.11)
RBC # BLD: ABNORMAL 10*6/UL
SEG NEUTROPHILS: 71 % (ref 36–65)
SEGMENTED NEUTROPHILS ABSOLUTE COUNT: 6.68 K/UL (ref 1.5–8.1)
SODIUM BLD-SCNC: 135 MMOL/L (ref 135–144)
TOTAL PROTEIN: 8.1 G/DL (ref 6.4–8.3)
WBC # BLD: 9.3 K/UL (ref 3.5–11.3)
WBC # BLD: ABNORMAL 10*3/UL

## 2021-05-14 PROCEDURE — 6360000004 HC RX CONTRAST MEDICATION: Performed by: NURSE PRACTITIONER

## 2021-05-14 PROCEDURE — 74177 CT ABD & PELVIS W/CONTRAST: CPT

## 2021-05-14 PROCEDURE — 2580000003 HC RX 258: Performed by: NURSE PRACTITIONER

## 2021-05-14 RX ORDER — SODIUM CHLORIDE 0.9 % (FLUSH) 0.9 %
10 SYRINGE (ML) INJECTION PRN
Status: COMPLETED | OUTPATIENT
Start: 2021-05-14 | End: 2021-05-14

## 2021-05-14 RX ORDER — 0.9 % SODIUM CHLORIDE 0.9 %
80 INTRAVENOUS SOLUTION INTRAVENOUS ONCE
Status: COMPLETED | OUTPATIENT
Start: 2021-05-14 | End: 2021-05-14

## 2021-05-14 RX ADMIN — SODIUM CHLORIDE, PRESERVATIVE FREE 10 ML: 5 INJECTION INTRAVENOUS at 13:03

## 2021-05-14 RX ADMIN — IOPAMIDOL 75 ML: 755 INJECTION, SOLUTION INTRAVENOUS at 13:03

## 2021-05-14 RX ADMIN — IOHEXOL 50 ML: 240 INJECTION, SOLUTION INTRATHECAL; INTRAVASCULAR; INTRAVENOUS; ORAL at 13:03

## 2021-05-14 RX ADMIN — SODIUM CHLORIDE 80 ML: 9 INJECTION, SOLUTION INTRAVENOUS at 13:03

## 2021-05-18 ENCOUNTER — OFFICE VISIT (OUTPATIENT)
Dept: GASTROENTEROLOGY | Age: 66
End: 2021-05-18
Payer: COMMERCIAL

## 2021-05-18 VITALS
SYSTOLIC BLOOD PRESSURE: 134 MMHG | BODY MASS INDEX: 46.56 KG/M2 | DIASTOLIC BLOOD PRESSURE: 85 MMHG | WEIGHT: 253 LBS | HEIGHT: 62 IN | HEART RATE: 73 BPM | OXYGEN SATURATION: 96 %

## 2021-05-18 DIAGNOSIS — K76.0 FATTY LIVER: ICD-10-CM

## 2021-05-18 DIAGNOSIS — K76.89 LIVER CYST: ICD-10-CM

## 2021-05-18 DIAGNOSIS — E78.1 HYPERTRIGLYCERIDEMIA: ICD-10-CM

## 2021-05-18 DIAGNOSIS — R10.11 RIGHT UPPER QUADRANT ABDOMINAL PAIN: Primary | ICD-10-CM

## 2021-05-18 DIAGNOSIS — D36.9 TUBULAR ADENOMA: ICD-10-CM

## 2021-05-18 DIAGNOSIS — K21.9 GASTROESOPHAGEAL REFLUX DISEASE WITHOUT ESOPHAGITIS: ICD-10-CM

## 2021-05-18 PROCEDURE — G8427 DOCREV CUR MEDS BY ELIG CLIN: HCPCS | Performed by: INTERNAL MEDICINE

## 2021-05-18 PROCEDURE — G8399 PT W/DXA RESULTS DOCUMENT: HCPCS | Performed by: INTERNAL MEDICINE

## 2021-05-18 PROCEDURE — 3017F COLORECTAL CA SCREEN DOC REV: CPT | Performed by: INTERNAL MEDICINE

## 2021-05-18 PROCEDURE — 1090F PRES/ABSN URINE INCON ASSESS: CPT | Performed by: INTERNAL MEDICINE

## 2021-05-18 PROCEDURE — 1123F ACP DISCUSS/DSCN MKR DOCD: CPT | Performed by: INTERNAL MEDICINE

## 2021-05-18 PROCEDURE — 1036F TOBACCO NON-USER: CPT | Performed by: INTERNAL MEDICINE

## 2021-05-18 PROCEDURE — G8417 CALC BMI ABV UP PARAM F/U: HCPCS | Performed by: INTERNAL MEDICINE

## 2021-05-18 PROCEDURE — 4040F PNEUMOC VAC/ADMIN/RCVD: CPT | Performed by: INTERNAL MEDICINE

## 2021-05-18 PROCEDURE — 99214 OFFICE O/P EST MOD 30 MIN: CPT | Performed by: INTERNAL MEDICINE

## 2021-05-18 RX ORDER — DICYCLOMINE HYDROCHLORIDE 10 MG/1
10 CAPSULE ORAL 4 TIMES DAILY
Qty: 120 CAPSULE | Refills: 3 | Status: SHIPPED | OUTPATIENT
Start: 2021-05-18 | End: 2021-07-15

## 2021-05-18 ASSESSMENT — ENCOUNTER SYMPTOMS
ANAL BLEEDING: 0
BLOOD IN STOOL: 0
EYES NEGATIVE: 1
DIARRHEA: 0
RESPIRATORY NEGATIVE: 1
VOMITING: 1
ABDOMINAL PAIN: 1
ABDOMINAL DISTENTION: 0
NAUSEA: 1
ALLERGIC/IMMUNOLOGIC NEGATIVE: 1
RECTAL PAIN: 0
CONSTIPATION: 1

## 2021-05-18 NOTE — PROGRESS NOTES
GI CLINIC FOLLOW UP    INTERVAL HISTORY:   No referring provider defined for this encounter. Chief Complaint   Patient presents with    Abdominal Pain           HISTORY OF PRESENT ILLNESS: Osito Gómez is a 76 y.o. female , referred for evaluation of abdominal pain. Here for f/u    not seen since 2018   abd pain epi gastric and RUQ saw Gabriella her CMP, ct scan and labs on Friday 5/14/2021   ct negative    labs elevated lipase  119  Normal LFTs   normal CBC   Ct as below   US pending   In the past without of symptoms or dysfunction on her and will give her empiric sphincterotomy, and since then she says she has been doing very well and did not have any pain until recently. She had an EGD colonoscopy in 2016 which showed polyps and she was supposed to have repeat in 3 years    Ct abd 5/14/2021  :  Impression   No acute intra-abdominal process.  No significant change in abdominopelvic   findings compared to the prior study from April 28, 2021.  No CT evidence of   acute pancreatitis or sequela.               Past Medical,Family, and Social History reviewed and does contribute to the patient presentingcondition. Patient's PMH/PSH,SH,PSYCH Hx, MEDs, ALLERGIES, and ROS were all reviewed and updated in the appropriate sections. PAST MEDICAL HISTORY:  Past Medical History:   Diagnosis Date    Anesthesia     severe breathing issues(stopped breathing) after EGD    Asthma     Atrial fibrillation ([address]     had ablation and pacemaker/defibrillator.      CAD (coronary artery disease)     pt denies    Cancer ([address]     skin on face    GERD (gastroesophageal reflux disease)     Hypertension     Morbid obesity ([address]     Prolonged emergence from general anesthesia     Seasonal allergies     Sleep apnea     uses cpap    Tubular adenoma     Type II or unspecified type diabetes mellitus without mention of complication, not stated as uncontrolled        Past Surgical History:   Procedure Laterality Date  ABDOMEN SURGERY      abdominal adhesion surgery on bowel    ACHILLES TENDON SURGERY Right     APPENDECTOMY      ARM SURGERY Left 10/20/2020    EXCISION LIPOMA FOREARM X 3 performed by Juanito Maldonado MD at [address]      2012    BLADDER SUSPENSION      CARDIAC DEFIBRILLATOR PLACEMENT  2012    AICD    CARPAL TUNNEL RELEASE Right 5/2/2019    CARPAL TUNNEL RELEASE performed by Capri Hernandez MD at [facility]      x3   [address] COLONOSCOPY      x3    COLONOSCOPY  3/16/16    Poor prep , not optimal for patient with polyps, repeat colonoscopy in 1- 2 years , tubular adenoma     COLONOSCOPY N/A 4/4/2018    COLONOSCOPY POLYPECTOMY SNARE performed by Gissel Guido MD at [facility]  07/24/2015    biliary sphincterotomy, pacreatic duct stent placement, it has been removed    EYE SURGERY Bilateral     cataract    HYSTERECTOMY      KNEE ARTHROSCOPY Left     PACEMAKER PLACEMENT  2012    AICD, had ablation first    SKIN BIOPSY      nose    UPPER GASTROINTESTINAL ENDOSCOPY  7 23 15    bx       CURRENT MEDICATIONS:    Current Outpatient Medications:     dicyclomine (BENTYL) 10 MG capsule, Take 1 capsule by mouth 4 times daily, Disp: 120 capsule, Rfl: 3    amLODIPine (NORVASC) 5 MG tablet, TAKE 1 TABLET BY MOUTH EVERY DAY, Disp: 90 tablet, Rfl: 1    doxycycline hyclate (VIBRA-TABS) 100 MG tablet, Take 1 tablet by mouth 2 times daily for 10 days, Disp: 20 tablet, Rfl: 0    FARXIGA 10 MG tablet, TAKE 1 TABLET BY MOUTH EVERY DAY IN THE MORNING, Disp: 90 tablet, Rfl: 1    pantoprazole (PROTONIX) 40 MG tablet, TAKE 1 TABLET BY MOUTH EVERY DAY, Disp: 90 tablet, Rfl: 1    sotalol (BETAPACE) 80 MG tablet, TAKE 1 TABLET BY MOUTH TWICE A DAY, Disp: 180 tablet, Rfl: 1    SYMBICORT 160-4.5 MCG/ACT AERO, TAKE 2 PUFFS BY MOUTH TWICE A DAY, Disp: 30.6 Inhaler, Rfl: 1    valsartan-hydroCHLOROthiazide (DIOVAN-HCT) 320-12.5 MG per tablet, TAKE 1 TABLET BY MOUTH EVERY DAY, Disp: 90 tablet, Rfl: 1    indomethacin (INDOCIN) 25 MG capsule, Take 1 capsule by mouth 3 times daily as needed for Pain, Disp: 30 capsule, Rfl: 0    mometasone (NASONEX) 50 MCG/ACT nasal spray, SPRAY TWO SPRAYS IN EACH NOSTRIL ONCE DAILY, Disp: 1 Inhaler, Rfl: 5    glipiZIDE (GLUCOTROL) 5 MG tablet, Take 1 tablet by mouth 2 times daily, Disp: 60 tablet, Rfl: 3    albuterol sulfate  (90 Base) MCG/ACT inhaler, TAKE 2 PUFFS BY MOUTH EVERY 6 HOURS AS NEEDED FOR WHEEZE, Disp: 6.7 Inhaler, Rfl: 3    albuterol (PROVENTIL) (2.5 MG/3ML) 0.083% nebulizer solution, Take 3 mLs by nebulization every 6 hours as needed for Wheezing, Disp: 120 each, Rfl: 3    Insulin Pen Needle 32G X 4 MM MISC, 1 each by Does not apply route daily, Disp: 90 each, Rfl: 3    loratadine (CLARITIN) 10 MG capsule, Take 1 capsule by mouth daily, Disp: 30 capsule, Rfl: 3    aspirin 325 MG tablet, Take 1 tablet by mouth every other day DUe 12/12/15, Disp: 30 tablet, Rfl: 5    ALLERGIES:   Allergies   Allergen Reactions    Demerol Hcl [Meperidine]     Lipitor [Atorvastatin]      General aching  General aching    Metformin And Related        FAMILY HISTORY:       Problem Relation Age of Onset    Cancer Father     Cancer Maternal Uncle     Cancer Paternal Uncle         colon    Cancer Paternal Uncle         lung    Cancer Paternal Uncle         pancreas    Cancer Paternal Uncle         pancreas    Cancer Paternal Uncle         pancreas    Cancer Paternal Uncle     Cancer Maternal Uncle     Cancer Maternal Uncle     Heart Disease Mother     Heart Disease Maternal Grandmother     Heart Disease Maternal Grandfather     Heart Disease Paternal Grandmother          SOCIAL HISTORY:   Social History     Socioeconomic History    Marital status:      Spouse name: Not on file    Number of children: Not on file    Years of education: Not on file    Highest education level: Not on file   Occupational History  Not on file   Tobacco Use    Smoking status: Never Smoker    Smokeless tobacco: Never Used   Vaping Use    Vaping Use: Never used   Substance and Sexual Activity    Alcohol use: No    Drug use: No    Sexual activity: Not on file   Other Topics Concern    Not on file   Social History Narrative    Not on file     Social Determinants of Health     Financial Resource Strain:     Difficulty of Paying Living Expenses:    Food Insecurity:     Worried About Running Out of Food in the Last Year:     Ran Out of Food in the Last Year:    Transportation Needs:     Lack of Transportation (Medical):  Lack of Transportation (Non-Medical):    Physical Activity:     Days of Exercise per Week:     Minutes of Exercise per Session:    Stress:     Feeling of Stress :    Social Connections:     Frequency of Communication with Friends and Family:     Frequency of Social Gatherings with Friends and Family:     Attends Confucianism Services:     Active Member of Clubs or Organizations:     Attends Club or Organization Meetings:     Marital Status:    Intimate Partner Violence:     Fear of Current or Ex-Partner:     Emotionally Abused:     Physically Abused:     Sexually Abused:        REVIEW OF SYSTEMS: A 12-point review of systemswas obtained and pertinent positives and negatives were enumerated above in the history of present illness. All other reviewed systems / symptoms were negative. Review of Systems   Constitutional: Positive for appetite change. Negative for activity change, chills, diaphoresis, fatigue, fever and unexpected weight change. HENT: Negative. Eyes: Negative. Respiratory: Negative. Cardiovascular: Negative. Gastrointestinal: Positive for abdominal pain, constipation, nausea and vomiting. Negative for abdominal distention, anal bleeding, blood in stool, diarrhea and rectal pain. Endocrine: Negative. Genitourinary: Negative. Musculoskeletal: Negative.     Skin: Negative. Allergic/Immunologic: Negative. Neurological: Negative. Hematological: Negative. Psychiatric/Behavioral: Negative. LABORATORY DATA: Reviewed  Lab Results   Component Value Date    WBC 9.3 05/14/2021    HGB 17.4 (H) 05/14/2021    HCT 56.8 (H) 05/14/2021    MCV 89.9 05/14/2021     05/14/2021     05/14/2021    K 3.8 05/14/2021    CL 94 (L) 05/14/2021    CO2 26 05/14/2021    BUN 16 05/14/2021    CREATININE 0.72 05/14/2021    LABPROT 6.8 03/07/2013    LABALBU 3.8 05/14/2021    BILITOT 0.61 05/14/2021    ALKPHOS 67 05/14/2021    AST 31 05/14/2021    ALT 25 05/14/2021    INR 1.0 04/28/2021         Lab Results   Component Value Date    RBC 6.32 (H) 05/14/2021    HGB 17.4 (H) 05/14/2021    MCV 89.9 05/14/2021    MCH 27.5 05/14/2021    MCHC 30.6 05/14/2021    RDW 16.6 (H) 05/14/2021    MPV 12.4 05/14/2021    BASOPCT 1 05/14/2021    LYMPHSABS 1.80 05/14/2021    MONOSABS 0.51 05/14/2021    NEUTROABS 6.68 05/14/2021    EOSABS 0.24 05/14/2021    BASOSABS 0.06 05/14/2021         DIAGNOSTIC TESTING:     CT ABDOMEN PELVIS W IV CONTRAST Additional Contrast? Oral    Result Date: 5/14/2021  EXAMINATION: CT OF THE ABDOMEN AND PELVIS WITH CONTRAST 5/14/2021 12:23 pm TECHNIQUE: CT of the abdomen and pelvis was performed with the administration of intravenous contrast. Multiplanar reformatted images are provided for review. Dose modulation, iterative reconstruction, and/or weight based adjustment of the mA/kV was utilized to reduce the radiation dose to as low as reasonably achievable. COMPARISON: CT abdomen and pelvis April 28, 2021.  HISTORY: ORDERING SYSTEM PROVIDED HISTORY: History of pancreatitis TECHNOLOGIST PROVIDED HISTORY: If she cannot tolerate oral contrast, dont push it. severe, sudden onset abdomen pain, loss of appetite, history of pancreatitis Reason for Exam: pt stated vomitting , loss appetite, Weight loss , abomen pain Acuity: Acute Type of Exam: Initial Additional signs and symptoms: pt stated vomitting , loss appetite, Weight loss , abomen pain FINDINGS: Lower Chest: Minimal bibasilar scarring. Organs: Hepatic steatosis. Liver cyst.  Pneumobilia. Gallbladder has been removed. Spleen and adrenal glands appear unremarkable. The pancreas demonstrates fatty infiltration without sequela of pancreatitis such as a peripancreatic fluid collection. Splenic artery demonstrates moderate calcification without aneurysm. Splenic vein is patent. No surrounding inflammatory changes are identified to suggest acute pancreatitis. Abdominal aorta appears normal in caliber. GI/Bowel: Stomach is grossly unremarkable. Small bowel appears nondilated. Some colonic tissue and abdominal wall is excluded from today's study. The visualized colon demonstrates no acute colonic findings. Pelvis: Urinary bladder is grossly unremarkable. Uterus has been removed. No adnexal mass. Peritoneum/Retroperitoneum: No free air, free fluid or lymphadenopathy. Bones/Soft Tissues: Visualized abdominal wall demonstrates no acute findings. Osseous structures demonstrate degenerative change. No acute intra-abdominal process. No significant change in abdominopelvic findings compared to the prior study from April 28, 2021. No CT evidence of acute pancreatitis or sequela. CT ABDOMEN PELVIS W IV CONTRAST Additional Contrast? None    Result Date: 4/28/2021  EXAMINATION: CT OF THE ABDOMEN AND PELVIS WITH CONTRAST 4/28/2021 2:06 am TECHNIQUE: CT of the abdomen and pelvis was performed with the administration of intravenous contrast. Multiplanar reformatted images are provided for review. Dose modulation, iterative reconstruction, and/or weight based adjustment of the mA/kV was utilized to reduce the radiation dose to as low as reasonably achievable.  COMPARISON: 11/15/2019 HISTORY: ORDERING SYSTEM PROVIDED HISTORY: [facility] abdominal pain TECHNOLOGIST PROVIDED HISTORY: [facility] abdominal pain Decision Support Exception->Emergency Medical Condition (MA) Reason for Exam: RUQ abdominal pain Additional signs and symptoms: Pt c/o right abdominal pain for 1 day. Relevant Medical/Surgical History: H/O cholecystectomy. FINDINGS: Lower Chest: The lung bases are clear. There are no pleural or pericardial effusions evident. Organs: There is diffuse hepatic steatosis. There is hepatomegaly. The gallbladder is absent. Pneumobilia is again noted. The spleen, pancreas, adrenal glands and kidneys are normal in appearance. GI/Bowel: There are no abnormally dilated loops of large or small bowel present. There is no mesenteric inflammatory stranding present. Pelvis: The urinary bladder is normal.  There is no free fluid or pelvic mass identified. Peritoneum/Retroperitoneum: There is no pathologically enlarged lymphadenopathy present. Bones/Soft Tissues: No lytic or blastic osseous lesions are identified. 1. No acute findings within the abdomen or pelvis. 2. Status post cholecystectomy. 3. Pneumobilia, unchanged. 4. Enlarged, fatty liver. XR CHEST PORTABLE    Result Date: 4/28/2021  EXAMINATION: ONE XRAY VIEW OF THE CHEST 4/28/2021 3:26 am COMPARISON: 02/18/2020 HISTORY: ORDERING SYSTEM PROVIDED HISTORY: abdominal pain and swelling evaluation for heart failure TECHNOLOGIST PROVIDED HISTORY: abdominal pain and swelling evaluation for heart failure Reason for Exam: abdominal pain and swelling evaluation for heart failure Acuity: Acute Type of Exam: Initial Additional signs and symptoms: abdominal pain and swelling evaluation for heart failure Relevant Medical/Surgical History: abdominal pain and swelling evaluation for heart failure FINDINGS: The mediastinal and cardiac contours are stable. There is a left pacemaker present. The lungs are clear. There is no focal consolidation, pleural effusion or pneumothorax evident. The bones are unremarkable. No acute cardiopulmonary process identified.           PHYSICAL EXAMINATION: Vital signs reviewed per the nursing documentation. /85 (Site: Left Lower Arm, Position: Sitting, Cuff Size: Medium Adult)   Pulse 73   Ht 5' 2\" (1.575 m)   Wt 253 lb (114.8 kg)   SpO2 96%   BMI 46.27 kg/m²   Body mass index is 46.27 kg/m². Physical Exam  Vitals and nursing note reviewed. Constitutional:       General: She is not in acute distress. Appearance: She is well-developed. She is not diaphoretic. HENT:      Head: Normocephalic. Mouth/Throat:      Pharynx: No oropharyngeal exudate. Eyes:      General: No scleral icterus. Pupils: Pupils are equal, round, and reactive to light. Neck:      Thyroid: No thyromegaly. Vascular: No JVD. Trachea: No tracheal deviation. Cardiovascular:      Rate and Rhythm: Normal rate and regular rhythm. Heart sounds: Normal heart sounds. No murmur heard. Pulmonary:      Effort: Pulmonary effort is normal. No respiratory distress. Breath sounds: Normal breath sounds. No wheezing. Abdominal:      General: Bowel sounds are normal. There is no distension. Palpations: Abdomen is soft. Tenderness: There is no abdominal tenderness. There is no guarding or rebound. Comments: No ascites   Musculoskeletal:         General: Normal range of motion. Cervical back: Normal range of motion and neck supple. Skin:     General: Skin is warm. Coloration: Skin is not pale. Findings: No erythema or rash. Comments: She is not diaphoretic   Neurological:      Mental Status: She is alert and oriented to person, place, and time. Deep Tendon Reflexes: Reflexes are normal and symmetric. Psychiatric:         Behavior: Behavior normal.         Thought Content: Thought content normal.         Judgment: Judgment normal.           IMPRESSION: Ms. Jaylin Meadows is a 76 y.o. female with      Diagnosis Orders   1. Right upper quadrant abdominal pain  Lipase    Amylase   2. Hypertriglyceridemia  Triglycerides   3.  Gastroesophageal reflux

## 2021-06-17 ENCOUNTER — HOSPITAL ENCOUNTER (OUTPATIENT)
Age: 66
Discharge: HOME | End: 2021-06-17
Payer: COMMERCIAL

## 2021-06-17 DIAGNOSIS — R10.11 RIGHT UPPER QUADRANT ABDOMINAL PAIN: ICD-10-CM

## 2021-06-17 DIAGNOSIS — E78.1 HYPERTRIGLYCERIDEMIA: ICD-10-CM

## 2021-06-17 LAB
AMYLASE: 38 U/L (ref 28–100)
LIPASE: 33 U/L (ref 13–60)
TRIGL SERPL-MCNC: 172 MG/DL

## 2021-06-17 PROCEDURE — 82150 ASSAY OF AMYLASE: CPT

## 2021-06-17 PROCEDURE — 36415 COLL VENOUS BLD VENIPUNCTURE: CPT

## 2021-06-17 PROCEDURE — 83690 ASSAY OF LIPASE: CPT

## 2021-06-17 PROCEDURE — 84478 ASSAY OF TRIGLYCERIDES: CPT

## 2021-08-18 ENCOUNTER — OFFICE VISIT (OUTPATIENT)
Dept: GASTROENTEROLOGY | Age: 66
End: 2021-08-18
Payer: COMMERCIAL

## 2021-08-18 VITALS
DIASTOLIC BLOOD PRESSURE: 80 MMHG | HEART RATE: 85 BPM | BODY MASS INDEX: 47.73 KG/M2 | WEIGHT: 261 LBS | SYSTOLIC BLOOD PRESSURE: 139 MMHG

## 2021-08-18 DIAGNOSIS — R10.11 RIGHT UPPER QUADRANT ABDOMINAL PAIN: ICD-10-CM

## 2021-08-18 DIAGNOSIS — D36.9 TUBULAR ADENOMA: Primary | ICD-10-CM

## 2021-08-18 DIAGNOSIS — K21.9 GASTROESOPHAGEAL REFLUX DISEASE WITHOUT ESOPHAGITIS: ICD-10-CM

## 2021-08-18 DIAGNOSIS — K76.0 FATTY LIVER: ICD-10-CM

## 2021-08-18 DIAGNOSIS — K76.89 LIVER CYST: ICD-10-CM

## 2021-08-18 PROCEDURE — 1036F TOBACCO NON-USER: CPT | Performed by: INTERNAL MEDICINE

## 2021-08-18 PROCEDURE — G8399 PT W/DXA RESULTS DOCUMENT: HCPCS | Performed by: INTERNAL MEDICINE

## 2021-08-18 PROCEDURE — 99214 OFFICE O/P EST MOD 30 MIN: CPT | Performed by: INTERNAL MEDICINE

## 2021-08-18 PROCEDURE — 3017F COLORECTAL CA SCREEN DOC REV: CPT | Performed by: INTERNAL MEDICINE

## 2021-08-18 PROCEDURE — 4040F PNEUMOC VAC/ADMIN/RCVD: CPT | Performed by: INTERNAL MEDICINE

## 2021-08-18 PROCEDURE — G8417 CALC BMI ABV UP PARAM F/U: HCPCS | Performed by: INTERNAL MEDICINE

## 2021-08-18 PROCEDURE — 1090F PRES/ABSN URINE INCON ASSESS: CPT | Performed by: INTERNAL MEDICINE

## 2021-08-18 PROCEDURE — G8427 DOCREV CUR MEDS BY ELIG CLIN: HCPCS | Performed by: INTERNAL MEDICINE

## 2021-08-18 PROCEDURE — 1123F ACP DISCUSS/DSCN MKR DOCD: CPT | Performed by: INTERNAL MEDICINE

## 2021-08-18 RX ORDER — POLYETHYLENE GLYCOL 3350 17 G/17G
POWDER, FOR SOLUTION ORAL
Qty: 238 G | Refills: 0 | Status: SHIPPED | OUTPATIENT
Start: 2021-08-18 | End: 2022-08-30

## 2021-08-18 ASSESSMENT — ENCOUNTER SYMPTOMS
DIARRHEA: 0
ABDOMINAL DISTENTION: 0
CHOKING: 0
SORE THROAT: 0
RECTAL PAIN: 0
VOMITING: 1
ALLERGIC/IMMUNOLOGIC NEGATIVE: 1
RESPIRATORY NEGATIVE: 1
WHEEZING: 0
BACK PAIN: 0
COUGH: 0
BLOOD IN STOOL: 0
EYES NEGATIVE: 1
NAUSEA: 1
ANAL BLEEDING: 0
SINUS PRESSURE: 0
CONSTIPATION: 1
ABDOMINAL PAIN: 1
VOICE CHANGE: 0
TROUBLE SWALLOWING: 0

## 2021-08-18 NOTE — PROGRESS NOTES
GI CLINIC FOLLOW UP    INTERVAL HISTORY:   No referring provider defined for this encounter. Chief Complaint   Patient presents with    Abdominal Pain     put on Bentyl and pain is much better. She just now feels like she is not emptying her bowels all the way    Gastroesophageal Reflux     Pantoprazole works well    Colonoscopy     She is due for her colonoscopy as well. HISTORY OF PRESENT ILLNESS: Tracey Diamond is a 76 y.o. female , referred for evaluation of*abd pain , fatty liver, GERD, liver cyst, colon polyps      Patient seen for follow-up  For the issues as above  She did have some abdominal pain but responded very well to Bentyl  She is mildly constipated  She is due for colonoscopy  She denied any other issues including any bleeding or fever or chills  Cbc /LFT may/2021 normal      Past Medical,Family, and Social History reviewed and does contribute to the patient presentingcondition. Patient's PMH/PSH,SH,PSYCH Hx, MEDs, ALLERGIES, and ROS were all reviewed and updated in the appropriate sections. PAST MEDICAL HISTORY:  Past Medical History:   Diagnosis Date    Anesthesia     severe breathing issues(stopped breathing) after EGD    Asthma     Atrial fibrillation ([address]     had ablation and pacemaker/defibrillator.      CAD (coronary artery disease)     pt denies    Cancer ([address]     skin on face    GERD (gastroesophageal reflux disease)     Hypertension     Morbid obesity ([address]     Prolonged emergence from general anesthesia     Seasonal allergies     Sleep apnea     uses cpap    Tubular adenoma     Type II or unspecified type diabetes mellitus without mention of complication, not stated as uncontrolled        Past Surgical History:   Procedure Laterality Date    ABDOMEN SURGERY      abdominal adhesion surgery on bowel    ACHILLES TENDON SURGERY Right     APPENDECTOMY      ARM SURGERY Left 10/20/2020    EXCISION LIPOMA FOREARM X 3 performed by Manasa Soto MD at [facility]      2012    BLADDER SUSPENSION      CARDIAC DEFIBRILLATOR PLACEMENT  2012    AICD    CARPAL TUNNEL RELEASE Right 5/2/2019    CARPAL TUNNEL RELEASE performed by Alaina Villeda MD at [address]      x3   [facility] COLONOSCOPY      x3    COLONOSCOPY  3/16/16    Poor prep , not optimal for patient with polyps, repeat colonoscopy in 1- 2 years , tubular adenoma     COLONOSCOPY N/A 4/4/2018    COLONOSCOPY POLYPECTOMY SNARE performed by Josefa Terry MD at [facility] ERCP  07/24/2015    biliary sphincterotomy, pacreatic duct stent placement, it has been removed    EYE SURGERY Bilateral     cataract    HYSTERECTOMY      KNEE ARTHROSCOPY Left     PACEMAKER PLACEMENT  2012    AICD, had ablation first    SKIN BIOPSY      nose    UPPER GASTROINTESTINAL ENDOSCOPY  7 23 15    bx       CURRENT MEDICATIONS:    Current Outpatient Medications:     TRESIBA FLEXTOUCH 100 UNIT/ML SOPN, INJECT 33 UNITS INTO THE SKIN 2 TIMES DAILY, Disp: 60 mL, Rfl: 5    allopurinol (ZYLOPRIM) 300 MG tablet, TAKE 1 TABLET BY MOUTH EVERY DAY, Disp: 90 tablet, Rfl: 1    glipiZIDE (GLUCOTROL) 5 MG tablet, TAKE 1 TABLET BY MOUTH 2 TIMES DAILY, Disp: 180 tablet, Rfl: 1    amLODIPine (NORVASC) 5 MG tablet, TAKE 1 TABLET BY MOUTH EVERY DAY, Disp: 90 tablet, Rfl: 1    FARXIGA 10 MG tablet, TAKE 1 TABLET BY MOUTH EVERY DAY IN THE MORNING, Disp: 90 tablet, Rfl: 1    pantoprazole (PROTONIX) 40 MG tablet, TAKE 1 TABLET BY MOUTH EVERY DAY, Disp: 90 tablet, Rfl: 1    sotalol (BETAPACE) 80 MG tablet, TAKE 1 TABLET BY MOUTH TWICE A DAY, Disp: 180 tablet, Rfl: 1    SYMBICORT 160-4.5 MCG/ACT AERO, TAKE 2 PUFFS BY MOUTH TWICE A DAY, Disp: 30.6 Inhaler, Rfl: 1    valsartan-hydroCHLOROthiazide (DIOVAN-HCT) 320-12.5 MG per tablet, TAKE 1 TABLET BY MOUTH EVERY DAY, Disp: 90 tablet, Rfl: 1    mometasone (NASONEX) 50 MCG/ACT nasal spray, SPRAY TWO SPRAYS IN EACH NOSTRIL ONCE DAILY, Disp: 1 Inhaler, Rfl: 5    albuterol sulfate  (90 Base) MCG/ACT inhaler, TAKE 2 PUFFS BY MOUTH EVERY 6 HOURS AS NEEDED FOR WHEEZE, Disp: 6.7 Inhaler, Rfl: 3    albuterol (PROVENTIL) (2.5 MG/3ML) 0.083% nebulizer solution, Take 3 mLs by nebulization every 6 hours as needed for Wheezing, Disp: 120 each, Rfl: 3    Insulin Pen Needle 32G X 4 MM MISC, 1 each by Does not apply route daily, Disp: 90 each, Rfl: 3    loratadine (CLARITIN) 10 MG capsule, Take 1 capsule by mouth daily, Disp: 30 capsule, Rfl: 3    aspirin 325 MG tablet, Take 1 tablet by mouth every other day DUe 12/12/15, Disp: 30 tablet, Rfl: 5    ALLERGIES:   Allergies   Allergen Reactions    Demerol Hcl [Meperidine]     Lipitor [Atorvastatin]      General aching  General aching    Metformin And Related        FAMILY HISTORY:       Problem Relation Age of Onset    Cancer Father     Cancer Maternal Uncle     Cancer Paternal Uncle         colon    Cancer Paternal Uncle         lung    Cancer Paternal Uncle         pancreas    Cancer Paternal Uncle         pancreas    Cancer Paternal Uncle         pancreas    Cancer Paternal Uncle     Cancer Maternal Uncle     Cancer Maternal Uncle     Heart Disease Mother     Heart Disease Maternal Grandmother     Heart Disease Maternal Grandfather     Heart Disease Paternal Grandmother          SOCIAL HISTORY:   Social History     Socioeconomic History    Marital status:      Spouse name: Not on file    Number of children: Not on file    Years of education: Not on file    Highest education level: Not on file   Occupational History    Not on file   Tobacco Use    Smoking status: Never Smoker    Smokeless tobacco: Never Used   Vaping Use    Vaping Use: Never used   Substance and Sexual Activity    Alcohol use: No    Drug use: No    Sexual activity: Not on file   Other Topics Concern    Not on file   Social History Narrative    Not on file     Social Determinants of Health Allergic/Immunologic: Negative. Negative for environmental allergies and food allergies. Neurological: Negative. Negative for dizziness, weakness, light-headedness, numbness and headaches. Hematological: Negative. Does not bruise/bleed easily. Psychiatric/Behavioral: Negative. Negative for sleep disturbance. The patient is not nervous/anxious. LABORATORY DATA: Reviewed  Lab Results   Component Value Date    WBC 9.3 05/14/2021    HGB 17.4 (H) 05/14/2021    HCT 56.8 (H) 05/14/2021    MCV 89.9 05/14/2021     05/14/2021     05/14/2021    K 3.8 05/14/2021    CL 94 (L) 05/14/2021    CO2 26 05/14/2021    BUN 16 05/14/2021    CREATININE 0.72 05/14/2021    LABPROT 6.8 03/07/2013    LABALBU 3.8 05/14/2021    BILITOT 0.61 05/14/2021    ALKPHOS 67 05/14/2021    AST 31 05/14/2021    ALT 25 05/14/2021    INR 1.0 04/28/2021         Lab Results   Component Value Date    RBC 6.32 (H) 05/14/2021    HGB 17.4 (H) 05/14/2021    MCV 89.9 05/14/2021    MCH 27.5 05/14/2021    MCHC 30.6 05/14/2021    RDW 16.6 (H) 05/14/2021    MPV 12.4 05/14/2021    BASOPCT 1 05/14/2021    LYMPHSABS 1.80 05/14/2021    MONOSABS 0.51 05/14/2021    NEUTROABS 6.68 05/14/2021    EOSABS 0.24 05/14/2021    BASOSABS 0.06 05/14/2021         DIAGNOSTIC TESTING:     No results found. PHYSICAL EXAMINATION: Vital signs reviewed per the nursing documentation. /80   Pulse 85   Wt 261 lb (118.4 kg)   BMI 47.73 kg/m²   Body mass index is 47.73 kg/m². Physical Exam  Vitals and nursing note reviewed. Constitutional:       General: She is not in acute distress. Appearance: She is well-developed. She is not diaphoretic. HENT:      Head: Normocephalic. Mouth/Throat:      Pharynx: No oropharyngeal exudate. Eyes:      General: No scleral icterus. Pupils: Pupils are equal, round, and reactive to light. Neck:      Thyroid: No thyromegaly. Vascular: No JVD. Trachea: No tracheal deviation. Cardiovascular:      Rate and Rhythm: Normal rate and regular rhythm. Heart sounds: Normal heart sounds. No murmur heard. Pulmonary:      Effort: Pulmonary effort is normal. No respiratory distress. Breath sounds: Normal breath sounds. No wheezing. Abdominal:      General: Bowel sounds are normal. There is no distension. Palpations: Abdomen is soft. Tenderness: There is no abdominal tenderness. There is no guarding or rebound. Comments: No ascites   Musculoskeletal:         General: Normal range of motion. Cervical back: Normal range of motion and neck supple. Skin:     General: Skin is warm. Coloration: Skin is not pale. Findings: No erythema or rash. Comments: She is not diaphoretic   Neurological:      Mental Status: She is alert and oriented to person, place, and time. Deep Tendon Reflexes: Reflexes are normal and symmetric. Psychiatric:         Behavior: Behavior normal.         Thought Content: Thought content normal.         Judgment: Judgment normal.           IMPRESSION: Ms. Aguila Rolon is a 76 y.o. female with      Diagnosis Orders   1. Tubular adenoma  COLONOSCOPY W/ OR W/O BIOPSY   2. Fatty liver     3. Liver cyst     4. Gastroesophageal reflux disease without esophagitis     5. Right upper quadrant abdominal pain         We will continue with the PPI  Continue with the Bentyl  Proceed with the above  Diet/life style/natural hx /complication of the dx were all explained in details   Past medical, past surgical, social history, psychiatric history, medications or allergies, all reviewed and  updated    Thank you for allowing me to participate in the care of Ms. Aguila Rolon. For any further questions please do not hesitate to contact me. I have reviewed and agree with the ROS entered by the MA/RN. Note is dictated utilizing voice recognition software. Unfortunately this leads to occasional typographical errors.  Please contact our office if you have any questions.       Shelton George MD  [facility]  O: #[phone]

## 2021-08-19 ENCOUNTER — TELEPHONE (OUTPATIENT)
Dept: GASTROENTEROLOGY | Age: 66
End: 2021-08-19

## 2021-08-19 NOTE — TELEPHONE ENCOUNTER
Writer spoke to pt over the phone informing pt she is scheduled for PAT phone call on 9/2/21 @ 4pm.  Pt voices her understanding.

## 2021-08-24 ENCOUNTER — HOSPITAL ENCOUNTER (OUTPATIENT)
Dept: WOMENS IMAGING | Age: 66
Discharge: HOME | End: 2021-08-26
Payer: COMMERCIAL

## 2021-08-24 DIAGNOSIS — Z12.39 SCREENING BREAST EXAMINATION: ICD-10-CM

## 2021-08-24 PROCEDURE — 77063 BREAST TOMOSYNTHESIS BI: CPT

## 2021-09-02 ENCOUNTER — HOSPITAL ENCOUNTER (OUTPATIENT)
Dept: PREADMISSION TESTING | Age: 66
Discharge: HOME | End: 2021-09-06
Payer: COMMERCIAL

## 2021-09-02 VITALS — WEIGHT: 258 LBS | BODY MASS INDEX: 47.48 KG/M2 | HEIGHT: 62 IN

## 2021-09-02 NOTE — PROGRESS NOTES
Pre-op Instructions For Out-Patient Endoscopy Surgery    Medication Instructions:  · Please stop herbs and any supplements now (includes vitamins and minerals). · Please contact your surgeon and prescribing physician for pre-op instructions for any blood thinners. (aspirin)    · If you have inhalers/aerosol treatments at home, please use them the morning of your surgery and bring the inhalers with you to the hospital.    · Please take the following medications the morning of your surgery with a sip of water:    Sotalol, Amlodipine    Surgery Instructions:  1. After midnight before surgery:  Do not eat or drink anything, including water, mints, gum, and hard candy. You may brush your teeth without swallowing. No smoking, chewing tobacco, or street drugs. 2. Please shower or bathe before surgery. 3. Please do not wear any cologne, lotion, powder, jewelry, piercings, perfume, makeup, nail polish, hair accessories, or hair spray on the day of surgery. Wear loose comfortable clothing. 4. Leave your valuables at home. Bring a storage case for any glasses/contacts. 5. An adult who is responsible for you MUST drive you home and should be with you for the first 24 hours after surgery. The Day of Surgery:  · Arrive at [facility] at the time directed by your surgeon and check in at the desk. · If you have a living will or healthcare power of , please bring a copy. · You will be taken to the pre-op holding area where you will be prepared for surgery. A physical assessment will be performed by a nurse practitioner or house officer. Your IV will be started and you will meet your anesthesiologist.    · When you go to surgery, your family will be directed to the surgical waiting room, where the doctor should speak with them after your surgery.     · After surgery, you will be taken to the recovery room then when you are awake and stable you will go to the short stay unit for preparation to be discharged. Only your one designated person is allowed to come to short stay for your discharge. Above instructions reviewed with patient via phone during PAT phone interview. Patient verbalized understanding of above.

## 2021-09-02 NOTE — PROGRESS NOTES
Patient relates that she had an appointment with her cardiologist to have her pacemaker checked but she slept in and missed her appointment. This nursed explained to patient that she needs to have her pacemaker interrogated face to face within 6 months of her surgery date. Patient relates she will call and make this appointment with her cardiologist as soon as we hang up from this phone call. Patient given my phone number and instructed to call me back with her appointment date. Patient verbalized understanding and relates that she will call back with appointment time.

## 2021-09-02 NOTE — PROGRESS NOTES
Dr. Loli Robb informed of patient's medical history and appointment for pacemaker interrogation on 9/14/21. No clearances requested per Dr. Loli Robb  except proof of pacemaker interrogation.

## 2021-09-03 ENCOUNTER — TELEPHONE (OUTPATIENT)
Dept: GASTROENTEROLOGY | Age: 66
End: 2021-09-03

## 2021-09-03 NOTE — PROGRESS NOTES
Surgery scheduler will notify Dr. Austyn Ritchie office who will be responsible for making sure proof of pacemaker interrogation within 6 months of procedure is obtained and in chart for surgery.

## 2021-09-03 NOTE — TELEPHONE ENCOUNTER
Spoke with pt in regards receiving  Clearance request from [facility], pt has an appt with cardiology on 9/14/21. Pt is aware she is going to be prepping at the time and states this is the only time the office could get her in. Pt does not want to r/s her procedure at this time and is aware that if she is not cleared she will need to r/s her procedure. Faxed clearance request to [facility] cardiology .

## 2021-09-10 RX ORDER — DICYCLOMINE HYDROCHLORIDE 10 MG/1
10 CAPSULE ORAL 4 TIMES DAILY
Qty: 360 CAPSULE | Refills: 3 | Status: SHIPPED | OUTPATIENT
Start: 2021-09-10 | End: 2022-08-30

## 2021-09-10 NOTE — TELEPHONE ENCOUNTER
Writer prepared and signed script per Dr Vanessa Turpin. Writer called and confirmed pt is still taking medication.

## 2021-09-14 ENCOUNTER — ANESTHESIA EVENT (OUTPATIENT)
Dept: ENDOSCOPY | Age: 66
End: 2021-09-14
Payer: COMMERCIAL

## 2021-09-14 RX ORDER — SODIUM CHLORIDE 9 MG/ML
25 INJECTION, SOLUTION INTRAVENOUS PRN
Status: CANCELLED | OUTPATIENT
Start: 2021-09-14

## 2021-09-15 ENCOUNTER — ANESTHESIA (OUTPATIENT)
Dept: ENDOSCOPY | Age: 66
End: 2021-09-15
Payer: COMMERCIAL

## 2021-09-15 ENCOUNTER — HOSPITAL ENCOUNTER (OUTPATIENT)
Age: 66
Setting detail: OUTPATIENT SURGERY
Discharge: HOME | End: 2021-09-15
Attending: INTERNAL MEDICINE | Admitting: INTERNAL MEDICINE
Payer: COMMERCIAL

## 2021-09-15 VITALS
WEIGHT: 260 LBS | HEIGHT: 62 IN | TEMPERATURE: 97.7 F | BODY MASS INDEX: 47.84 KG/M2 | SYSTOLIC BLOOD PRESSURE: 136 MMHG | RESPIRATION RATE: 18 BRPM | DIASTOLIC BLOOD PRESSURE: 71 MMHG | OXYGEN SATURATION: 95 % | HEART RATE: 70 BPM

## 2021-09-15 VITALS
RESPIRATION RATE: 22 BRPM | SYSTOLIC BLOOD PRESSURE: 94 MMHG | OXYGEN SATURATION: 94 % | DIASTOLIC BLOOD PRESSURE: 64 MMHG

## 2021-09-15 LAB
GLUCOSE BLD-MCNC: 142 MG/DL (ref 65–105)
GLUCOSE BLD-MCNC: 150 MG/DL (ref 65–105)

## 2021-09-15 PROCEDURE — 2580000003 HC RX 258: Performed by: ANESTHESIOLOGY

## 2021-09-15 PROCEDURE — 7100000011 HC PHASE II RECOVERY - ADDTL 15 MIN: Performed by: INTERNAL MEDICINE

## 2021-09-15 PROCEDURE — 7100000001 HC PACU RECOVERY - ADDTL 15 MIN: Performed by: INTERNAL MEDICINE

## 2021-09-15 PROCEDURE — 3700000000 HC ANESTHESIA ATTENDED CARE: Performed by: INTERNAL MEDICINE

## 2021-09-15 PROCEDURE — 7100000010 HC PHASE II RECOVERY - FIRST 15 MIN: Performed by: INTERNAL MEDICINE

## 2021-09-15 PROCEDURE — 82947 ASSAY GLUCOSE BLOOD QUANT: CPT

## 2021-09-15 PROCEDURE — 6360000002 HC RX W HCPCS: Performed by: NURSE ANESTHETIST, CERTIFIED REGISTERED

## 2021-09-15 PROCEDURE — 7100000000 HC PACU RECOVERY - FIRST 15 MIN: Performed by: INTERNAL MEDICINE

## 2021-09-15 PROCEDURE — 3609027000 HC COLONOSCOPY: Performed by: INTERNAL MEDICINE

## 2021-09-15 PROCEDURE — 7100000031 HC ASPR PHASE II RECOVERY - ADDTL 15 MIN: Performed by: INTERNAL MEDICINE

## 2021-09-15 PROCEDURE — 45378 DIAGNOSTIC COLONOSCOPY: CPT | Performed by: INTERNAL MEDICINE

## 2021-09-15 PROCEDURE — 2709999900 HC NON-CHARGEABLE SUPPLY: Performed by: INTERNAL MEDICINE

## 2021-09-15 PROCEDURE — 7100000030 HC ASPR PHASE II RECOVERY - FIRST 15 MIN: Performed by: INTERNAL MEDICINE

## 2021-09-15 PROCEDURE — 3700000001 HC ADD 15 MINUTES (ANESTHESIA): Performed by: INTERNAL MEDICINE

## 2021-09-15 RX ORDER — MORPHINE SULFATE 2 MG/ML
2 INJECTION, SOLUTION INTRAMUSCULAR; INTRAVENOUS EVERY 5 MIN PRN
Status: DISCONTINUED | OUTPATIENT
Start: 2021-09-15 | End: 2021-09-15 | Stop reason: HOSPADM

## 2021-09-15 RX ORDER — MEPERIDINE HYDROCHLORIDE 25 MG/ML
12.5 INJECTION INTRAMUSCULAR; INTRAVENOUS; SUBCUTANEOUS EVERY 5 MIN PRN
Status: DISCONTINUED | OUTPATIENT
Start: 2021-09-15 | End: 2021-09-15 | Stop reason: HOSPADM

## 2021-09-15 RX ORDER — SODIUM CHLORIDE 0.9 % (FLUSH) 0.9 %
10 SYRINGE (ML) INJECTION PRN
Status: DISCONTINUED | OUTPATIENT
Start: 2021-09-15 | End: 2021-09-15 | Stop reason: HOSPADM

## 2021-09-15 RX ORDER — ONDANSETRON 2 MG/ML
4 INJECTION INTRAMUSCULAR; INTRAVENOUS
Status: DISCONTINUED | OUTPATIENT
Start: 2021-09-15 | End: 2021-09-15 | Stop reason: HOSPADM

## 2021-09-15 RX ORDER — SODIUM CHLORIDE 0.9 % (FLUSH) 0.9 %
10 SYRINGE (ML) INJECTION EVERY 12 HOURS SCHEDULED
Status: DISCONTINUED | OUTPATIENT
Start: 2021-09-15 | End: 2021-09-15 | Stop reason: HOSPADM

## 2021-09-15 RX ORDER — PROPOFOL 10 MG/ML
INJECTION, EMULSION INTRAVENOUS CONTINUOUS PRN
Status: DISCONTINUED | OUTPATIENT
Start: 2021-09-15 | End: 2021-09-15 | Stop reason: SDUPTHER

## 2021-09-15 RX ORDER — LABETALOL HYDROCHLORIDE 5 MG/ML
5 INJECTION, SOLUTION INTRAVENOUS EVERY 10 MIN PRN
Status: DISCONTINUED | OUTPATIENT
Start: 2021-09-15 | End: 2021-09-15 | Stop reason: HOSPADM

## 2021-09-15 RX ORDER — SODIUM CHLORIDE 9 MG/ML
INJECTION, SOLUTION INTRAVENOUS CONTINUOUS
Status: DISCONTINUED | OUTPATIENT
Start: 2021-09-15 | End: 2021-09-15 | Stop reason: HOSPADM

## 2021-09-15 RX ORDER — LIDOCAINE HYDROCHLORIDE 10 MG/ML
1 INJECTION, SOLUTION EPIDURAL; INFILTRATION; INTRACAUDAL; PERINEURAL
Status: DISCONTINUED | OUTPATIENT
Start: 2021-09-15 | End: 2021-09-15 | Stop reason: HOSPADM

## 2021-09-15 RX ORDER — DIPHENHYDRAMINE HYDROCHLORIDE 50 MG/ML
12.5 INJECTION INTRAMUSCULAR; INTRAVENOUS
Status: DISCONTINUED | OUTPATIENT
Start: 2021-09-15 | End: 2021-09-15 | Stop reason: HOSPADM

## 2021-09-15 RX ADMIN — SODIUM CHLORIDE: 9 INJECTION, SOLUTION INTRAVENOUS at 08:26

## 2021-09-15 RX ADMIN — PROPOFOL 125 MCG/KG/MIN: 10 INJECTION, EMULSION INTRAVENOUS at 09:13

## 2021-09-15 ASSESSMENT — ENCOUNTER SYMPTOMS
SHORTNESS OF BREATH: 0
STRIDOR: 0

## 2021-09-15 ASSESSMENT — PULMONARY FUNCTION TESTS
PIF_VALUE: 1

## 2021-09-15 ASSESSMENT — PAIN SCALES - GENERAL
PAINLEVEL_OUTOF10: 0

## 2021-09-15 ASSESSMENT — LIFESTYLE VARIABLES: SMOKING_STATUS: 0

## 2021-09-15 ASSESSMENT — PAIN - FUNCTIONAL ASSESSMENT: PAIN_FUNCTIONAL_ASSESSMENT: 0-10

## 2021-09-15 NOTE — ANESTHESIA PRE PROCEDURE
Department of Anesthesiology  Preprocedure Note       Name:  Whitney Luis   Age:  76 y.o.  :  1955                                          MRN:  [id number]         Date:  9/15/2021      Surgeon: Ashleigh Burris):  Quinn Reid MD    Procedure: Procedure(s):  COLONOSCOPY DIAGNOSTIC    Medications prior to admission:   Prior to Admission medications    Medication Sig Start Date End Date Taking? Authorizing Provider   dicyclomine (BENTYL) 10 MG capsule TAKE 1 CAPSULE BY MOUTH 4 TIMES DAILY 9/10/21   Sharri Castillo MD   mometasone (NASONEX) 50 MCG/ACT nasal spray SPRAY TWO SPRAYS IN EACH NOSTRIL ONCE DAILY 21   Taiwo Lamar APRN - CNP   FARXIGA 10 MG tablet TAKE 1 TABLET BY MOUTH EVERY DAY IN THE MORNING 21   FLORIDALMA Haley - HANNY   magnesium citrate solution Take 296 mLs by mouth once for 1 dose 21  Sharri Castillo MD   polyethylene glycol (GLYCOLAX) 17 GM/SCOOP powder Use as directed by following your patient instructions given by office.  21   Sharri Castillo MD   bisacodyl (DULCOLAX) 5 MG EC tablet TAKE 4 TABS AT 10 AM THE DAY PRIOR TO COLONOSCOPY 21   Sharri Castillo MD   TRESIBA FLEXTOUCH 100 UNIT/ML SOPN INJECT 33 UNITS INTO THE SKIN 2 TIMES DAILY  Patient taking differently: Inject 40 Units into the skin  21   Raheem Pérez PA-C   allopurinol (ZYLOPRIM) 300 MG tablet TAKE 1 TABLET BY MOUTH EVERY DAY 21   Taiwo Lamar APRN - HANNY   glipiZIDE (GLUCOTROL) 5 MG tablet TAKE 1 TABLET BY MOUTH 2 TIMES DAILY 6/15/21   Taiwo Lamar APRN - CNP   amLODIPine (NORVASC) 5 MG tablet TAKE 1 TABLET BY MOUTH EVERY DAY 21   Taiwo Lamar APRN - CNP   pantoprazole (PROTONIX) 40 MG tablet TAKE 1 TABLET BY MOUTH EVERY DAY 21   Taiwo Lamar, APRN - CNP   sotalol (BETAPACE) 80 MG tablet TAKE 1 TABLET BY MOUTH TWICE A DAY 21   Taiwo Lamar APRN - HANNY   SYMBICORT 160-4.5 MCG/ACT AERO TAKE 2 PUFFS BY MOUTH TWICE A DAY 3/24/21   Sachin Kurtz Amparo Tanvir, APRN - CNP   valsartan-hydroCHLOROthiazide (DIOVAN-HCT) 320-12.5 MG per tablet TAKE 1 TABLET BY MOUTH EVERY DAY 3/24/21   FLORIDALMA Sanchez CNP   albuterol sulfate  (90 Base) MCG/ACT inhaler TAKE 2 PUFFS BY MOUTH EVERY 6 HOURS AS NEEDED FOR WHEEZE 4/29/20   Baljinder Sicks, APRN - CNP   albuterol (PROVENTIL) (2.5 MG/3ML) 0.083% nebulizer solution Take 3 mLs by nebulization every 6 hours as needed for Wheezing 2/18/20   FLORIDALMA Sanchez CNP   Insulin Pen Needle 32G X 4 MM MISC 1 each by Does not apply route daily 2/18/20   FLORIDALMA Sanchez CNP   loratadine (CLARITIN) 10 MG capsule Take 1 capsule by mouth daily 2/18/20   FLORIDALMA Sanchez CNP   aspirin 325 MG tablet Take 1 tablet by mouth every other day DUe 12/12/15 2/18/20   FLORIDALMA Sanchez CNP       Current medications:    Current Facility-Administered Medications   Medication Dose Route Frequency Provider Last Rate Last Admin    0.9 % sodium chloride infusion   IntraVENous Continuous Topher Livingston MD        sodium chloride flush 0.9 % injection 10 mL  10 mL IntraVENous 2 times per day Topher Livingston MD        sodium chloride flush 0.9 % injection 10 mL  10 mL IntraVENous PRN Topher Livingston MD        lidocaine PF 1 % injection 1 mL  1 mL IntraDERmal Once PRN Topher Livingston MD           Allergies:     Allergies   Allergen Reactions    Demerol Hcl [Meperidine]     Lipitor [Atorvastatin]      General aching  General aching    Metformin And Related        Problem List:    Patient Active Problem List   Diagnosis Code    CAD (coronary artery disease) I25.10    Type 2 diabetes mellitus with diabetic polyneuropathy, with long-term current use of insulin (HCC) E11.42, Z79.4    Hypertension I10    Atrial fibrillation ([facility] I48.91    Liver cyst K76.89    Colon polyps K63.5    Fatty liver K76.0    Abdominal pain R10.9    Nausea R11.0    Diarrhea R19.7    Hypertriglyceridemia E78.1    VIVIANA on CPAP G47.33, Z99.89    RUQ abdominal pain R10.11    Asthma J45.909    Obstructive sleep apnea syndrome G47.33    Tubular adenoma D36.9    Gastroesophageal reflux disease without esophagitis K21.9    Rectal bleeding K62.5    Lipoma of left forearm D17.22       Past Medical History:        Diagnosis Date    Anesthesia 2011    severe breathing issues(stopped breathing) after EGD    Asthma     Atrial fibrillation ([address]     had ablation and pacemaker/defibrillator.      CAD (coronary artery disease)     pt denies    Cancer [facility])     skin on face    COVID-19 vaccine administered 5- & 4-    PFIZER    GERD (gastroesophageal reflux disease)     Hypertension     Morbid obesity ([facility]     Prolonged emergence from general anesthesia     Seasonal allergies     Sleep apnea     uses cpap    Tubular adenoma     Type II or unspecified type diabetes mellitus without mention of complication, not stated as uncontrolled        Past Surgical History:        Procedure Laterality Date    ABDOMEN SURGERY      abdominal adhesion surgery on bowel    ACHILLES TENDON SURGERY Right     APPENDECTOMY      ARM SURGERY Left 10/20/2020    EXCISION LIPOMA FOREARM X 3 performed by Jp Beltran MD at [address] [address]   [facility]  2012    [address] Right 5/2/2019    CARPAL TUNNEL RELEASE performed by Tamiko Wilson MD at [address] [facility] [address]      x3   [facility] COLONOSCOPY      x3    COLONOSCOPY  3/16/16    Poor prep , not optimal for patient with polyps, repeat colonoscopy in 1- 2 years , tubular adenoma     COLONOSCOPY N/A 4/4/2018    COLONOSCOPY POLYPECTOMY SNARE performed by Beth Blanca MD at [address] ERCP  07/24/2015    biliary sphincterotomy, pacreatic duct stent placement, it has been removed    EYE SURGERY Bilateral     cataract    HYSTERECTOMY      KNEE ARTHROSCOPY Left     OVARY REMOVAL      PACEMAKER PLACEMENT  2012    AICD, had ablation first    SKIN BIOPSY      nose    UPPER GASTROINTESTINAL ENDOSCOPY  7 23 15    bx       Social History:    Social History     Tobacco Use    Smoking status: Never Smoker    Smokeless tobacco: Never Used   Substance Use Topics    Alcohol use: No                                Counseling given: Not Answered      Vital Signs (Current): There were no vitals filed for this visit. BP Readings from Last 3 Encounters:   08/18/21 139/80   07/15/21 128/82   05/18/21 134/85       NPO Status:                                                                                 BMI:   Wt Readings from Last 3 Encounters:   09/02/21 258 lb (117 kg)   08/18/21 261 lb (118.4 kg)   07/15/21 257 lb (116.6 kg)     There is no height or weight on file to calculate BMI.    CBC:   Lab Results   Component Value Date    WBC 9.3 05/14/2021    RBC 6.32 05/14/2021    RBC 4.23 06/05/2012    HGB 17.4 05/14/2021    HCT 56.8 05/14/2021    MCV 89.9 05/14/2021    RDW 16.6 05/14/2021     05/14/2021     06/05/2012     LR    CMP:   Lab Results   Component Value Date     05/14/2021    K 3.8 05/14/2021    CL 94 05/14/2021    CO2 26 05/14/2021    BUN 16 05/14/2021    CREATININE 0.72 05/14/2021    GFRAA >60 05/14/2021    LABGLOM >60 05/14/2021    GLUCOSE 84 05/14/2021    GLUCOSE 224 06/05/2012    PROT 8.1 05/14/2021    CALCIUM 9.7 05/14/2021    BILITOT 0.61 05/14/2021    ALKPHOS 67 05/14/2021    AST 31 05/14/2021    ALT 25 05/14/2021       POC Tests: No results for input(s): POCGLU, POCNA, POCK, POCCL, POCBUN, POCHEMO, POCHCT in the last 72 hours.     Coags:   Lab Results   Component Value Date    PROTIME 13.6 04/28/2021    PROTIME 10.7 04/24/2012    INR 1.0 04/28/2021    APTT 27.2 04/28/2021       HCG (If Applicable): No results found for: PREGTESTUR, PREGSERUM, HCG, HCGQUANT     ABGs: No results found for: PHART, PO2ART, AOO3BYL, SAN3WIX, BEART, O9MCTXKC     Type & Screen (If Applicable):  No results found for: LABABO, LABRH    Drug/Infectious Status (If Applicable):  Lab Results   Component Value Date    HEPCAB NONREACTIVE 07/22/2015       COVID-19 Screening (If Applicable):   Lab Results   Component Value Date    COVID19 Not Detected 10/16/2020           Anesthesia Evaluation  Patient summary reviewed and Nursing notes reviewed history of anesthetic complications:   Airway: Mallampati: III  TM distance: >3 FB   Neck ROM: full  Mouth opening: > = 3 FB Dental: normal exam         Pulmonary:normal exam  breath sounds clear to auscultation  (+) sleep apnea:  asthma: allergic asthma,     (-) pneumonia, COPD, shortness of breath, recent URI, rhonchi, wheezes, rales, stridor, not a current smoker and no decreased breath sounds          Patient did not smoke on day of surgery. Cardiovascular:  Exercise tolerance: good (>4 METS),   (+) hypertension: no interval change, pacemaker: AICD, pacemaker and no interval change, CAD: no interval change,     (-) valvular problems/murmurs, past MI, CABG/stent, dysrhythmias,  angina,  CHF, orthopnea, PND,  ARTEAGA, murmur, weak pulses,  friction rub, systolic click, carotid bruit,  JVD, peripheral edema, no pulmonary hypertension and no hyperlipidemia    ECG reviewed  Rhythm: regular  Rate: normal  Echocardiogram reviewed         Beta Blocker:  Dose within 24 Hrs         Neuro/Psych:   (+) neuromuscular disease:,    (-) seizures, TIA, CVA, headaches, psychiatric history and depression/anxiety            GI/Hepatic/Renal:   (+) GERD: no interval change, liver disease:,      (-) hiatal hernia, PUD, hepatitis, no renal disease, bowel prep and no morbid obesity       Endo/Other:    (+) DiabetesType II DM, , no malignancy/cancer.     (-) hypothyroidism, hyperthyroidism, blood dyscrasia, arthritis, no electrolyte abnormalities, no malignancy/cancer               Abdominal:             Vascular: negative

## 2021-09-15 NOTE — ANESTHESIA POSTPROCEDURE EVALUATION
POST- ANESTHESIA EVALUATION       Pt Name: Rolfe Frankel  MRN: [id number]  YOB: 1955  Date of evaluation: 9/15/2021  Time:  1:54 PM      /71   Pulse 70   Temp 97.7 °F (36.5 °C) (Temporal)   Resp 18   Ht 5' 2\" (1.575 m)   Wt 260 lb (117.9 kg)   SpO2 95%   BMI 47.55 kg/m²      Consciousness Level  Awake  Cardiopulmonary Status  Stable  Pain Adequately Treated YES  Nausea / Vomiting  NO  Adequate Hydration  YES  Anesthesia Related Complications NONE      Electronically signed by Jessica Allen MD on 9/15/2021 at 1:54 PM       Department of Anesthesiology  Postprocedure Note    Patient: Rolfe Frankel  MRN: [id number]  YOB: 1955  Date of evaluation: 9/15/2021  Time:  1:54 PM     Procedure Summary     Date: 09/15/21 Room / Location: [place] [address] / [facility]    Anesthesia Start: 6995 Anesthesia Stop: Nelta Net    Procedure: COLONOSCOPY DIAGNOSTIC (N/A Anus) Diagnosis: (TUBULAR ADENOMA  (PT HAS HAD COVID VACCINE DONE))    Surgeons: Noah Phelan MD Responsible Provider: Jessica Allen MD    Anesthesia Type: general ASA Status: 4          Anesthesia Type: general    Sarah Phase I: Sarah Score: 9    Sarah Phase II: Sarah Score: 10    Last vitals: Reviewed and per EMR flowsheets.        Anesthesia Post Evaluation

## 2021-09-15 NOTE — H&P
emergence from general anesthesia     Seasonal allergies     Sleep apnea     uses cpap    Tubular adenoma     Type II or unspecified type diabetes mellitus without mention of complication, not stated as uncontrolled        SURGICAL HISTORY       Past Surgical History:   Procedure Laterality Date    ABDOMEN SURGERY      abdominal adhesion surgery on bowel    ACHILLES TENDON SURGERY Right     APPENDECTOMY      ARM SURGERY Left 10/20/2020    EXCISION LIPOMA FOREARM X 3 performed by Miguelito Parekh MD at [address]      2012   [address] [facility]  2012    [address] Right 5/2/2019    CARPAL TUNNEL RELEASE performed by Markel Guerrier MD at [address] [facility] [address]      x3   [address] COLONOSCOPY      x3    COLONOSCOPY  3/16/16    Poor prep , not optimal for patient with polyps, repeat colonoscopy in 1- 2 years , tubular adenoma     COLONOSCOPY N/A 4/4/2018    COLONOSCOPY POLYPECTOMY SNARE performed by Froilan Gilbert MD at [address] ERCP  07/24/2015    biliary sphincterotomy, pacreatic duct stent placement, it has been removed    EYE SURGERY Bilateral     cataract    HYSTERECTOMY      KNEE ARTHROSCOPY Left     OVARY REMOVAL      PACEMAKER PLACEMENT  2012    AICD, had ablation first    SKIN BIOPSY      nose    UPPER GASTROINTESTINAL ENDOSCOPY  7 23 15    bx       FAMILY HISTORY       Family History   Problem Relation Age of Onset    Cancer Father     Cancer Maternal Uncle     Cancer Paternal Uncle         colon    Cancer Paternal Uncle         lung    Cancer Paternal Uncle         pancreas    Cancer Paternal Uncle         pancreas    Cancer Paternal Uncle         pancreas    Cancer Paternal Uncle     Cancer Maternal Uncle     Cancer Maternal Uncle     Heart Disease Mother     Heart Disease Maternal Grandmother     Heart Disease Maternal Grandfather     Heart Disease Paternal Grandmother SOCIAL HISTORY       Social History     Socioeconomic History    Marital status:      Spouse name: Not on file    Number of children: Not on file    Years of education: Not on file    Highest education level: Not on file   Occupational History    Not on file   Tobacco Use    Smoking status: Never Smoker    Smokeless tobacco: Never Used   Vaping Use    Vaping Use: Never used   Substance and Sexual Activity    Alcohol use: No    Drug use: No    Sexual activity: Not on file   Other Topics Concern    Not on file   Social History Narrative    Not on file     Social Determinants of Health     Financial Resource Strain:     Difficulty of Paying Living Expenses:    Food Insecurity:     Worried About Running Out of Food in the Last Year:     [address] in the Last Year:    Transportation Needs:     Lack of Transportation (Medical):  Lack of Transportation (Non-Medical):    Physical Activity:     Days of Exercise per Week:     Minutes of Exercise per Session:    Stress:     Feeling of Stress :    Social Connections:     Frequency of Communication with Friends and Family:     Frequency of Social Gatherings with Friends and Family:     Attends [employer] Services:     Active Member of Clubs or Organizations:     Attends Club or Organization Meetings:     Marital Status:    Intimate Partner Violence:     Fear of Current or Ex-Partner:     Emotionally Abused:     Physically Abused:     Sexually Abused:            REVIEW OF SYSTEMS      Allergies   Allergen Reactions    Demerol Hcl [Meperidine]     Lipitor [Atorvastatin]      General aching  General aching    Metformin And Related        No current facility-administered medications on file prior to encounter.      Current Outpatient Medications on File Prior to Encounter   Medication Sig Dispense Refill    magnesium citrate solution Take 296 mLs by mouth once for 1 dose 296 mL 0    polyethylene glycol (GLYCOLAX) 17 GM/SCOOP powder Use as directed by following your patient instructions given by office. 238 g 0    bisacodyl (DULCOLAX) 5 MG EC tablet TAKE 4 TABS AT 10 AM THE DAY PRIOR TO COLONOSCOPY 4 tablet 0    TRESIBA FLEXTOUCH 100 UNIT/ML SOPN INJECT 33 UNITS INTO THE SKIN 2 TIMES DAILY (Patient taking differently: Inject 40 Units into the skin ) 60 mL 5    allopurinol (ZYLOPRIM) 300 MG tablet TAKE 1 TABLET BY MOUTH EVERY DAY 90 tablet 1    glipiZIDE (GLUCOTROL) 5 MG tablet TAKE 1 TABLET BY MOUTH 2 TIMES DAILY 180 tablet 1    amLODIPine (NORVASC) 5 MG tablet TAKE 1 TABLET BY MOUTH EVERY DAY 90 tablet 1    pantoprazole (PROTONIX) 40 MG tablet TAKE 1 TABLET BY MOUTH EVERY DAY 90 tablet 1    sotalol (BETAPACE) 80 MG tablet TAKE 1 TABLET BY MOUTH TWICE A  tablet 1    SYMBICORT 160-4.5 MCG/ACT AERO TAKE 2 PUFFS BY MOUTH TWICE A DAY 30.6 Inhaler 1    valsartan-hydroCHLOROthiazide (DIOVAN-HCT) 320-12.5 MG per tablet TAKE 1 TABLET BY MOUTH EVERY DAY 90 tablet 1    albuterol sulfate  (90 Base) MCG/ACT inhaler TAKE 2 PUFFS BY MOUTH EVERY 6 HOURS AS NEEDED FOR WHEEZE 6.7 Inhaler 3    albuterol (PROVENTIL) (2.5 MG/3ML) 0.083% nebulizer solution Take 3 mLs by nebulization every 6 hours as needed for Wheezing 120 each 3    Insulin Pen Needle 32G X 4 MM MISC 1 each by Does not apply route daily 90 each 3    loratadine (CLARITIN) 10 MG capsule Take 1 capsule by mouth daily 30 capsule 3    aspirin 325 MG tablet Take 1 tablet by mouth every other day DUe 12/12/15 30 tablet 5       Negative except for what is mentioned in the HPI. GENERAL PHYSICAL EXAM     Vitals :   See vital signs in RN flow sheet. GENERAL APPEARANCE:   Orin Lind is 76 y.o.,  female, severely obese, nourished, conscious, alert. Does not appear to be distress or pain at this time. SKIN:  Warm, dry, no cyanosis or jaundice. HEAD:  Normocephalic, atraumatic, no swelling or tenderness. EYES:  Pupils equal, reactive to light. EARS:  No discharge, no marked hearing loss. NOSE:  No rhinorrhea, epistaxis or septal deformity. THROAT:  Not congested. No ulceration bleeding or discharge. NECK:  No stiffness, trachea central.  No palpable masses or L.N.                 CHEST:  Symmetrical and equal on expansion. HEART:  RRR S1 > S2. No audible murmurs or gallops. AICD in place. LUNGS:  Equal on expansion, normal breath sounds. No adventitious sounds. ABDOMEN:  Obese. Soft on palpation. No dysphagia, No localized tenderness. No guarding or rigidity. No palpable hepatosplenomegaly. LYMPHATICS:  No palpable cervical lymphadenopathy. LOCOMOTOR, BACK AND SPINE:  No tenderness or deformities. EXTREMITIES:  Symmetrical, no pretibial edema. Geovannys sign negative. No discoloration or ulcerations. NEUROLOGIC:  The patient is conscious, alert, oriented,Cranial nerve II-XII intact, taste and smell were not examined. No apparent focal sensory or motor deficits.              PROVISIONAL DIAGNOSES / SURGERY:      TUBULAR ADENOMA    COLONOSCOPY DIAGNOSTIC    Patient Active Problem List    Diagnosis Date Noted    Lipoma of left forearm     Rectal bleeding 03/15/2018    Gastroesophageal reflux disease without esophagitis 04/11/2016    Tubular adenoma     Asthma 12/02/2015    Obstructive sleep apnea syndrome 12/02/2015    Abdominal pain 07/22/2015    Nausea 07/22/2015    Diarrhea 07/22/2015    Hypertriglyceridemia 07/22/2015    VIVIANA on CPAP 07/22/2015    RUQ abdominal pain     Liver cyst 03/27/2013    Colon polyps 03/27/2013    Fatty liver 03/27/2013    CAD (coronary artery disease)     Type 2 diabetes mellitus with diabetic polyneuropathy, with long-term current use of insulin ([address]     Hypertension     Atrial fibrillation ([address]            SAURABH FRANKLIN, APRN - CNP on 9/15/2021 at 7:16 AM

## 2021-09-15 NOTE — OP NOTE
Operative Note  PROCEDURE NOTE    DATE OF PROCEDURE: 9/15/2021    SURGEON: Roman Groves MD  Facility : [facility]  ASSISTANT: None  Anesthesia: MAC  PREOPERATIVE DIAGNOSIS:   History of polyps     POSTOPERATIVE DIAGNOSIS: as described below    OPERATION: Total colonoscopy     ANESTHESIA: Moderate Sedation    ESTIMATED BLOOD LOSS: less than 50     COMPLICATIONS: None. SPECIMENS:  Was Not Obtained    HISTORY: The patient is a 76y.o. year old female with history of above preop diagnosis. I recommended colonoscopy with possible biopsy or polypectomy and I explained the risk, benefits, expected outcome, and alternatives to the procedure. Risks included but are not limited to bleeding, infection, respiratory distress, hypotension, and perforation of the colon and possibility of missing a lesion. The patient understands and is in agreement. The patient was counseled at length about the risks of lorenzo Covid-19 during their perioperative period and any recovery window from their procedure. The patient was made aware that lorenzo Covid-19  may worsen their prognosis for recovering from their procedure  and lend to a higher morbidity and/or mortality risk. All material risks, benefits, and reasonable alternatives including postponing the procedure were discussed. The patient does wish to proceed with the procedure at this time. PROCEDURE: The patient was given IV conscious sedation. The patient's SPO2 remained above 90% throughout the procedure. The colonoscope was inserted per rectum and advanced under direct vision to the cecum without difficulty. Post sedation note : The patient's SPO2 remained above 90% throughout the procedure. the vital signs remained stable , and no immediate complication form the procedure noted, patient will be ready for d/c when criteria is met . The prep was fair.       Findings:  Terminal ileum: normal    Cecum/Ascending colon: normal    Transverse colon: abnormal: Diverticulosis    Descending/Sigmoid colon: abnormal: Diverticulosis    Rectum/Anus: examined in normal and retroflexed positions and was abnormal: Internal hemorrhoids    Withdrawal Time was (minutes): 10    The colon was decompressed and the scope was removed. The patient tolerated the procedure well. Recommendations/Plan:   1. Lifestyle and dietary modifications as discussed  2. F/U Biopsies  3. F/U In OfficeYes  4. Discussed with the family  5.  Repeat colonoscopy [id number]    Electronically signed by Jeanne Triplett MD  on 9/15/2021 at 9:48 AM

## 2021-09-16 ENCOUNTER — TELEPHONE (OUTPATIENT)
Dept: GASTROENTEROLOGY | Age: 66
End: 2021-09-16

## 2021-09-16 NOTE — TELEPHONE ENCOUNTER
Called patient to make a colon follow up and declined all locations other than [place]. Advised a recall letter will be made and sent when time to call for the [place] location. Writer thanked and call ended.

## 2022-02-26 ENCOUNTER — HOSPITAL ENCOUNTER (OUTPATIENT)
Dept: ULTRASOUND IMAGING | Age: 67
Discharge: HOME | End: 2022-02-28
Payer: COMMERCIAL

## 2022-02-26 DIAGNOSIS — R19.03 RLQ ABDOMINAL MASS: ICD-10-CM

## 2022-02-26 PROCEDURE — 76705 ECHO EXAM OF ABDOMEN: CPT

## 2022-05-25 ENCOUNTER — HOSPITAL ENCOUNTER (OUTPATIENT)
Age: 67
Setting detail: SPECIMEN
Discharge: HOME | End: 2022-05-25

## 2022-05-25 DIAGNOSIS — Z79.4 TYPE 2 DIABETES MELLITUS WITH DIABETIC POLYNEUROPATHY, WITH LONG-TERM CURRENT USE OF INSULIN (HCC): ICD-10-CM

## 2022-05-25 DIAGNOSIS — E11.42 TYPE 2 DIABETES MELLITUS WITH DIABETIC POLYNEUROPATHY, WITH LONG-TERM CURRENT USE OF INSULIN (HCC): ICD-10-CM

## 2022-05-25 LAB
CREATININE URINE: 49 MG/DL (ref 28–217)
MICROALBUMIN/CREAT 24H UR: <12 MG/L
MICROALBUMIN/CREAT UR-RTO: NORMAL MCG/MG CREAT

## 2022-09-27 ENCOUNTER — HOSPITAL ENCOUNTER (OUTPATIENT)
Age: 67
Discharge: HOME | End: 2022-09-27
Payer: COMMERCIAL

## 2022-09-27 DIAGNOSIS — E78.1 HYPERTRIGLYCERIDEMIA: ICD-10-CM

## 2022-09-27 LAB
ABSOLUTE EOS #: 0.2 K/UL (ref 0–0.4)
ABSOLUTE LYMPH #: 1.7 K/UL (ref 1–4.8)
ABSOLUTE MONO #: 0.3 K/UL (ref 0.1–1.3)
ALBUMIN SERPL-MCNC: 3.8 G/DL (ref 3.5–5.2)
ALP BLD-CCNC: 68 U/L (ref 35–104)
ALT SERPL-CCNC: 31 U/L (ref 5–33)
ANION GAP SERPL CALCULATED.3IONS-SCNC: 7 MMOL/L (ref 9–17)
ANION GAP SERPL CALCULATED.3IONS-SCNC: 7 MMOL/L (ref 9–17)
AST SERPL-CCNC: 28 U/L
BASOPHILS # BLD: 1 % (ref 0–2)
BASOPHILS ABSOLUTE: 0 K/UL (ref 0–0.2)
BILIRUB SERPL-MCNC: 0.3 MG/DL (ref 0.3–1.2)
BUN BLDV-MCNC: 18 MG/DL (ref 8–23)
BUN BLDV-MCNC: 18 MG/DL (ref 8–23)
CALCIUM SERPL-MCNC: 9.8 MG/DL (ref 8.6–10.4)
CALCIUM SERPL-MCNC: 9.9 MG/DL (ref 8.6–10.4)
CHLORIDE BLD-SCNC: 100 MMOL/L (ref 98–107)
CHLORIDE BLD-SCNC: 99 MMOL/L (ref 98–107)
CHOLESTEROL, FASTING: 165 MG/DL
CHOLESTEROL/HDL RATIO: 3.5
CO2: 34 MMOL/L (ref 20–31)
CO2: 34 MMOL/L (ref 20–31)
CREAT SERPL-MCNC: 0.57 MG/DL (ref 0.5–0.9)
CREAT SERPL-MCNC: 0.59 MG/DL (ref 0.5–0.9)
EOSINOPHILS RELATIVE PERCENT: 4 % (ref 0–4)
GFR AFRICAN AMERICAN: >60 ML/MIN
GFR AFRICAN AMERICAN: >60 ML/MIN
GFR NON-AFRICAN AMERICAN: >60 ML/MIN
GFR NON-AFRICAN AMERICAN: >60 ML/MIN
GFR SERPL CREATININE-BSD FRML MDRD: ABNORMAL ML/MIN/{1.73_M2}
GFR SERPL CREATININE-BSD FRML MDRD: ABNORMAL ML/MIN/{1.73_M2}
GLUCOSE BLD-MCNC: 193 MG/DL (ref 70–99)
GLUCOSE BLD-MCNC: 205 MG/DL (ref 70–99)
HCT VFR BLD CALC: 44.9 % (ref 36–46)
HDLC SERPL-MCNC: 47 MG/DL
HEMOGLOBIN: 14.4 G/DL (ref 12–16)
LDL CHOLESTEROL: 80 MG/DL (ref 0–130)
LYMPHOCYTES # BLD: 30 % (ref 24–44)
MAGNESIUM: 1.8 MG/DL (ref 1.6–2.6)
MCH RBC QN AUTO: 27.6 PG (ref 26–34)
MCHC RBC AUTO-ENTMCNC: 32.1 G/DL (ref 31–37)
MCV RBC AUTO: 85.9 FL (ref 80–100)
MONOCYTES # BLD: 6 % (ref 1–7)
PDW BLD-RTO: 15.4 % (ref 11.5–14.9)
PLATELET # BLD: 115 K/UL (ref 150–450)
PMV BLD AUTO: 9 FL (ref 6–12)
POTASSIUM SERPL-SCNC: 4.4 MMOL/L (ref 3.7–5.3)
POTASSIUM SERPL-SCNC: 4.5 MMOL/L (ref 3.7–5.3)
RBC # BLD: 5.22 M/UL (ref 4–5.2)
SEG NEUTROPHILS: 59 % (ref 36–66)
SEGMENTED NEUTROPHILS ABSOLUTE COUNT: 3.3 K/UL (ref 1.3–9.1)
SODIUM BLD-SCNC: 140 MMOL/L (ref 135–144)
SODIUM BLD-SCNC: 141 MMOL/L (ref 135–144)
TOTAL PROTEIN: 7 G/DL (ref 6.4–8.3)
TRIGLYCERIDE, FASTING: 188 MG/DL
WBC # BLD: 5.6 K/UL (ref 3.5–11)

## 2022-09-27 PROCEDURE — 36415 COLL VENOUS BLD VENIPUNCTURE: CPT

## 2022-09-27 PROCEDURE — 80048 BASIC METABOLIC PNL TOTAL CA: CPT

## 2022-09-27 PROCEDURE — 80061 LIPID PANEL: CPT

## 2022-09-27 PROCEDURE — 80053 COMPREHEN METABOLIC PANEL: CPT

## 2022-09-27 PROCEDURE — 85025 COMPLETE CBC W/AUTO DIFF WBC: CPT

## 2022-09-27 PROCEDURE — 83735 ASSAY OF MAGNESIUM: CPT

## 2022-11-30 ENCOUNTER — HOSPITAL ENCOUNTER (OUTPATIENT)
Dept: WOMENS IMAGING | Age: 67
Discharge: HOME | End: 2022-12-02
Payer: COMMERCIAL

## 2022-11-30 DIAGNOSIS — Z12.31 ENCOUNTER FOR SCREENING MAMMOGRAM FOR MALIGNANT NEOPLASM OF BREAST: ICD-10-CM

## 2022-11-30 PROCEDURE — 77063 BREAST TOMOSYNTHESIS BI: CPT

## 2023-03-02 PROBLEM — D69.6 THROMBOCYTOPENIA (HCC): Status: ACTIVE | Noted: 2023-03-02

## 2023-03-28 ENCOUNTER — HOSPITAL ENCOUNTER (OUTPATIENT)
Dept: NON INVASIVE DIAGNOSTICS | Age: 68
Discharge: HOME | End: 2023-03-28
Payer: MEDICARE

## 2023-03-28 DIAGNOSIS — I42.8 NONISCHEMIC CARDIOMYOPATHY (HCC): ICD-10-CM

## 2023-03-28 LAB
LV EF: 55 %
LVEF MODALITY: NORMAL

## 2023-03-28 PROCEDURE — 93306 TTE W/DOPPLER COMPLETE: CPT

## 2023-05-11 ENCOUNTER — TELEPHONE (OUTPATIENT)
Dept: GASTROENTEROLOGY | Age: 68
End: 2023-05-11

## 2023-05-11 NOTE — TELEPHONE ENCOUNTER
Patient's daughter Rhina Tobias called office asking what pt should do if she feels she is not getting answers about her GI problem and wants a second opinion. Rhina Tobias is advised to have pt call the office to discuss her care and schedule and appt, as information cannot be released to her.

## 2023-06-05 ENCOUNTER — HOSPITAL ENCOUNTER (OUTPATIENT)
Age: 68
Setting detail: SPECIMEN
Discharge: HOME | End: 2023-06-05

## 2023-06-06 ENCOUNTER — HOSPITAL ENCOUNTER (OUTPATIENT)
Age: 68
Setting detail: SPECIMEN
Discharge: HOME | End: 2023-06-06

## 2023-06-06 DIAGNOSIS — Z79.4 TYPE 2 DIABETES MELLITUS WITH DIABETIC MONONEUROPATHY, WITH LONG-TERM CURRENT USE OF INSULIN (HCC): ICD-10-CM

## 2023-06-06 DIAGNOSIS — I10 ESSENTIAL HYPERTENSION: ICD-10-CM

## 2023-06-06 DIAGNOSIS — E11.41 TYPE 2 DIABETES MELLITUS WITH DIABETIC MONONEUROPATHY, WITH LONG-TERM CURRENT USE OF INSULIN (HCC): ICD-10-CM

## 2023-06-06 DIAGNOSIS — E78.1 HYPERTRIGLYCERIDEMIA: ICD-10-CM

## 2023-06-06 LAB
ALBUMIN SERPL-MCNC: 3.6 G/DL (ref 3.5–5.2)
ALBUMIN/GLOB SERPL: 1.1 {RATIO} (ref 1–2.5)
ALP SERPL-CCNC: 68 U/L (ref 35–104)
ALT SERPL-CCNC: 29 U/L (ref 5–33)
ANION GAP SERPL CALCULATED.3IONS-SCNC: 12 MMOL/L (ref 9–17)
AST SERPL-CCNC: 36 U/L
BILIRUB SERPL-MCNC: 0.3 MG/DL (ref 0.3–1.2)
BUN SERPL-MCNC: 21 MG/DL (ref 8–23)
CALCIUM SERPL-MCNC: 9.8 MG/DL (ref 8.6–10.4)
CHLORIDE SERPL-SCNC: 99 MMOL/L (ref 98–107)
CHOLEST SERPL-MCNC: 164 MG/DL
CHOLESTEROL/HDL RATIO: 4.2
CO2 SERPL-SCNC: 29 MMOL/L (ref 20–31)
CREAT SERPL-MCNC: 0.79 MG/DL (ref 0.5–0.9)
CREAT UR-MCNC: 75 MG/DL (ref 28–217)
ERYTHROCYTE [DISTWIDTH] IN BLOOD BY AUTOMATED COUNT: 17.2 % (ref 11.8–14.4)
GFR SERPL CREATININE-BSD FRML MDRD: >60 ML/MIN/1.73M2
GLUCOSE SERPL-MCNC: 139 MG/DL (ref 70–99)
HCT VFR BLD AUTO: 53.7 % (ref 36.3–47.1)
HDLC SERPL-MCNC: 39 MG/DL
HGB BLD-MCNC: 16.2 G/DL (ref 11.9–15.1)
LDLC SERPL CALC-MCNC: 73 MG/DL (ref 0–130)
MCH RBC QN AUTO: 27.6 PG (ref 25.2–33.5)
MCHC RBC AUTO-ENTMCNC: 30.2 G/DL (ref 28.4–34.8)
MCV RBC AUTO: 91.6 FL (ref 82.6–102.9)
MICROALBUMIN UR-MCNC: 27 MG/L
MICROALBUMIN/CREAT UR-RTO: 36 MCG/MG CREAT
NRBC AUTOMATED: 0 PER 100 WBC
PLATELET # BLD AUTO: 134 K/UL (ref 138–453)
PMV BLD AUTO: 12.3 FL (ref 8.1–13.5)
POTASSIUM SERPL-SCNC: 4.5 MMOL/L (ref 3.7–5.3)
PROT SERPL-MCNC: 7 G/DL (ref 6.4–8.3)
RBC # BLD AUTO: 5.86 M/UL (ref 3.95–5.11)
SODIUM SERPL-SCNC: 140 MMOL/L (ref 135–144)
TRIGL SERPL-MCNC: 259 MG/DL
WBC OTHER # BLD: 6 K/UL (ref 3.5–11.3)

## 2023-10-22 ENCOUNTER — HOSPITAL ENCOUNTER (EMERGENCY)
Age: 68
Discharge: HOME | End: 2023-10-22
Attending: EMERGENCY MEDICINE
Payer: MEDICARE

## 2023-10-22 ENCOUNTER — APPOINTMENT (OUTPATIENT)
Dept: GENERAL RADIOLOGY | Age: 68
End: 2023-10-22
Payer: MEDICARE

## 2023-10-22 VITALS
SYSTOLIC BLOOD PRESSURE: 132 MMHG | HEIGHT: 62 IN | TEMPERATURE: 98.1 F | RESPIRATION RATE: 18 BRPM | OXYGEN SATURATION: 93 % | BODY MASS INDEX: 49.32 KG/M2 | WEIGHT: 268 LBS | DIASTOLIC BLOOD PRESSURE: 55 MMHG | HEART RATE: 89 BPM

## 2023-10-22 DIAGNOSIS — R60.0 LEG EDEMA, RIGHT: Primary | ICD-10-CM

## 2023-10-22 LAB — D DIMER PPP FEU-MCNC: 0.76 UG/ML FEU (ref 0–0.59)

## 2023-10-22 PROCEDURE — 99284 EMERGENCY DEPT VISIT MOD MDM: CPT

## 2023-10-22 PROCEDURE — 6370000000 HC RX 637 (ALT 250 FOR IP)

## 2023-10-22 PROCEDURE — 36415 COLL VENOUS BLD VENIPUNCTURE: CPT

## 2023-10-22 PROCEDURE — 85379 FIBRIN DEGRADATION QUANT: CPT

## 2023-10-22 PROCEDURE — 73590 X-RAY EXAM OF LOWER LEG: CPT

## 2023-10-22 RX ORDER — CEPHALEXIN 250 MG/1
500 CAPSULE ORAL ONCE
Status: COMPLETED | OUTPATIENT
Start: 2023-10-22 | End: 2023-10-22

## 2023-10-22 RX ORDER — IBUPROFEN 200 MG
400 TABLET ORAL ONCE
Status: COMPLETED | OUTPATIENT
Start: 2023-10-22 | End: 2023-10-22

## 2023-10-22 RX ORDER — CEPHALEXIN 500 MG/1
500 CAPSULE ORAL 2 TIMES DAILY
Qty: 14 CAPSULE | Refills: 0 | Status: SHIPPED | OUTPATIENT
Start: 2023-10-22 | End: 2023-10-29

## 2023-10-22 RX ORDER — ACETAMINOPHEN 500 MG
1000 TABLET ORAL ONCE
Status: COMPLETED | OUTPATIENT
Start: 2023-10-22 | End: 2023-10-22

## 2023-10-22 RX ADMIN — IBUPROFEN 400 MG: 200 TABLET, FILM COATED ORAL at 20:32

## 2023-10-22 RX ADMIN — CEPHALEXIN 500 MG: 250 CAPSULE ORAL at 21:11

## 2023-10-22 RX ADMIN — APIXABAN 10 MG: 5 TABLET, FILM COATED ORAL at 21:11

## 2023-10-22 RX ADMIN — ACETAMINOPHEN 1000 MG: 500 TABLET ORAL at 20:32

## 2023-10-22 ASSESSMENT — PAIN SCALES - GENERAL: PAINLEVEL_OUTOF10: 8

## 2023-10-22 ASSESSMENT — PAIN DESCRIPTION - ORIENTATION: ORIENTATION: RIGHT;LOWER

## 2023-10-22 ASSESSMENT — PAIN DESCRIPTION - DESCRIPTORS: DESCRIPTORS: THROBBING

## 2023-10-22 ASSESSMENT — PAIN DESCRIPTION - LOCATION: LOCATION: LEG

## 2023-10-22 ASSESSMENT — PAIN - FUNCTIONAL ASSESSMENT: PAIN_FUNCTIONAL_ASSESSMENT: 0-10

## 2023-10-22 ASSESSMENT — PAIN DESCRIPTION - PAIN TYPE: TYPE: ACUTE PAIN

## 2023-10-22 NOTE — ED PROVIDER NOTES
[facility] ED  Emergency Department Encounter  Emergency Medicine Resident     Pt Claudia Case  MRN: [id number]  [address] [facility] 1955  Date of evaluation: 10/22/23  PCP:  FLORIDALMA Reynolds CNP  Note Started: 7:48 PM EDT      CHIEF COMPLAINT       Chief Complaint   Patient presents with    Leg Pain     2 weeks ago, fell going up the stairs of her home. Continues to have swelling and redness to right lower leg, concerned form blood clot       HISTORY OF PRESENT ILLNESS  (Location/Symptom, Timing/Onset, Context/Setting, Quality, Duration, Modifying Factors, Severity.)      Zaki Williamson is a 70 y.o. female who presents with right leg swelling and pain 1 week after falling on it going up the stairs. She did have immediate pain but was able to ambulate without difficulty. Did not hit her head or lose consciousness. Since then has noted worsening swelling and pain in her lower leg, primarily over shin where she hit on the stairs. Denies numbness or tingling in distal extremity. No pain in foot. Denies fever or chills. No history of blood clots. Not a smoker, not on hormone therapy, no recent surgeries. No chest pain, cough, shortness of breath. PAST MEDICAL / SURGICAL / SOCIAL / FAMILY HISTORY      has a past medical history of Anesthesia, Asthma, Atrial fibrillation ([address]), CAD (coronary artery disease), Cancer ([address]), COVID-19 vaccine administered, GERD (gastroesophageal reflux disease), Hypertension, Morbid obesity ([address]), Prolonged emergence from general anesthesia, Seasonal allergies, Sleep apnea, Tubular adenoma, and Type II or unspecified type diabetes mellitus without mention of complication, not stated as uncontrolled. has a past surgical history that includes Appendectomy;  section; bladder suspension; Cholecystectomy; Achilles tendon surgery (Right); Hysterectomy; Upper gastrointestinal endoscopy (7 23 15); ERCP (2015); Knee arthroscopy (Left); skin biopsy;  Colonoscopy;

## 2023-10-23 ENCOUNTER — HOSPITAL ENCOUNTER (OUTPATIENT)
Dept: VASCULAR LAB | Age: 68
Discharge: HOME | End: 2023-10-25
Payer: MEDICARE

## 2023-10-23 DIAGNOSIS — R60.0 LEG EDEMA, RIGHT: ICD-10-CM

## 2023-10-23 PROCEDURE — 93970 EXTREMITY STUDY: CPT | Performed by: SURGERY

## 2023-10-23 PROCEDURE — 93970 EXTREMITY STUDY: CPT

## 2023-10-23 NOTE — DISCHARGE INSTRUCTIONS
You were seen and evaluated in the emergency department for swelling of your right lower extremity. Your findings were concerning for a lower extremity blood clot. Your findings are also concerning for mild cellulitis which is likely due to an infected hematoma. You were started on a course of antibiotics as well as the initial course of anticoagulation. You should call tomorrow to get an ultrasound completed to confirm the diagnosis of a DVT in order for your insurance to pay for your anticoagulation. You should schedule appointment to be seen by your primary care provider within the next week, but ensure this is after the results from your DVT ultrasound of been posted. Begin to experience shortness of breath, chest pain, cough with blood, you should return the emergency department for further evaluation.

## 2023-12-07 PROBLEM — D68.69 SECONDARY HYPERCOAGULABLE STATE (HCC): Status: ACTIVE | Noted: 2023-12-07

## 2024-01-01 NOTE — PROGRESS NOTES
Pt had her AICD interrogated Feb 2019 and needs her pacemaker battery changed in 6 months. She has an appt in May to have  this taken care of. She has a hx of Atrial Fibrillation and V tach.   Cardiac clearance requested because of this and abnormal EKG in PAT
-3

## 2024-02-01 ENCOUNTER — HOSPITAL ENCOUNTER (OUTPATIENT)
Dept: WOMENS IMAGING | Age: 69
Discharge: HOME | End: 2024-02-03
Payer: MEDICARE

## 2024-02-01 VITALS — WEIGHT: 255 LBS | BODY MASS INDEX: 46.93 KG/M2 | HEIGHT: 62 IN

## 2024-02-01 DIAGNOSIS — Z12.31 VISIT FOR SCREENING MAMMOGRAM: ICD-10-CM

## 2024-02-01 PROCEDURE — 77063 BREAST TOMOSYNTHESIS BI: CPT

## 2024-02-14 ENCOUNTER — TELEPHONE (OUTPATIENT)
Dept: PHARMACY | Age: 69
End: 2024-02-14

## 2024-02-14 ENCOUNTER — HOSPITAL ENCOUNTER (OUTPATIENT)
Age: 69
Setting detail: SPECIMEN
Discharge: HOME | End: 2024-02-14

## 2024-02-14 DIAGNOSIS — Z79.4 TYPE 2 DIABETES MELLITUS WITH DIABETIC MONONEUROPATHY, WITH LONG-TERM CURRENT USE OF INSULIN (HCC): ICD-10-CM

## 2024-02-14 DIAGNOSIS — E11.41 TYPE 2 DIABETES MELLITUS WITH DIABETIC MONONEUROPATHY, WITH LONG-TERM CURRENT USE OF INSULIN (HCC): ICD-10-CM

## 2024-02-14 LAB
ALBUMIN SERPL-MCNC: 4 G/DL (ref 3.5–5.2)
ALBUMIN/GLOB SERPL: 1.1 {RATIO} (ref 1–2.5)
ALP SERPL-CCNC: 74 U/L (ref 35–104)
ALT SERPL-CCNC: 26 U/L (ref 5–33)
ANION GAP SERPL CALCULATED.3IONS-SCNC: 10 MMOL/L (ref 9–17)
AST SERPL-CCNC: 29 U/L
BASOPHILS # BLD: 0.05 K/UL (ref 0–0.2)
BASOPHILS NFR BLD: 1 % (ref 0–2)
BILIRUB SERPL-MCNC: 0.4 MG/DL (ref 0.3–1.2)
BUN SERPL-MCNC: 23 MG/DL (ref 8–23)
CALCIUM SERPL-MCNC: 10.1 MG/DL (ref 8.6–10.4)
CHLORIDE SERPL-SCNC: 100 MMOL/L (ref 98–107)
CHOLEST SERPL-MCNC: 191 MG/DL
CHOLESTEROL/HDL RATIO: 4.3
CO2 SERPL-SCNC: 32 MMOL/L (ref 20–31)
CREAT SERPL-MCNC: 0.7 MG/DL (ref 0.5–0.9)
EOSINOPHIL # BLD: 0.22 K/UL (ref 0–0.44)
EOSINOPHILS RELATIVE PERCENT: 3 % (ref 1–4)
ERYTHROCYTE [DISTWIDTH] IN BLOOD BY AUTOMATED COUNT: 18.2 % (ref 11.8–14.4)
GFR SERPL CREATININE-BSD FRML MDRD: >60 ML/MIN/1.73M2
GLUCOSE SERPL-MCNC: 98 MG/DL (ref 70–99)
HDLC SERPL-MCNC: 44 MG/DL
HGB BLD-MCNC: 16.2 G/DL (ref 11.9–15.1)
IMM GRANULOCYTES # BLD AUTO: 0.03 K/UL (ref 0–0.3)
IMM GRANULOCYTES NFR BLD: 0 %
LDLC SERPL CALC-MCNC: 98 MG/DL (ref 0–130)
LYMPHOCYTES NFR BLD: 2.43 K/UL (ref 1.1–3.7)
LYMPHOCYTES RELATIVE PERCENT: 35 % (ref 24–43)
MCH RBC QN AUTO: 27.2 PG (ref 25.2–33.5)
MCHC RBC AUTO-ENTMCNC: 30.1 G/DL (ref 28.4–34.8)
MCV RBC AUTO: 90.3 FL (ref 82.6–102.9)
MONOCYTES NFR BLD: 0.43 K/UL (ref 0.1–1.2)
MONOCYTES NFR BLD: 6 % (ref 3–12)
NEUTROPHILS NFR BLD: 55 % (ref 36–65)
NEUTS SEG NFR BLD: 3.8 K/UL (ref 1.5–8.1)
NRBC BLD-RTO: 0 PER 100 WBC
PLATELET # BLD AUTO: 138 K/UL (ref 138–453)
PMV BLD AUTO: 12.2 FL (ref 8.1–13.5)
POTASSIUM SERPL-SCNC: 4.5 MMOL/L (ref 3.7–5.3)
RBC # BLD AUTO: 5.96 M/UL (ref 3.95–5.11)
RBC # BLD: ABNORMAL 10*6/UL
SODIUM SERPL-SCNC: 142 MMOL/L (ref 135–144)
TRIGL SERPL-MCNC: 243 MG/DL
WBC OTHER # BLD: 7 K/UL (ref 3.5–11.3)

## 2024-02-14 NOTE — TELEPHONE ENCOUNTER
New referral for diabetes medication management received from patient's PCP.  Called to explain service and set up initial appt but no answer so left message for patient to call us back.  Jovita Boss [facility],Pharm.D,, BCPS, CACP  2/14/2024  5:16 PM

## 2024-03-01 ENCOUNTER — TELEPHONE (OUTPATIENT)
Dept: ADMINISTRATIVE | Age: 69
End: 2024-03-01

## 2024-03-01 NOTE — TELEPHONE ENCOUNTER
Patient requesting Colonoscopy to be scheduled with Dr. Castillo, please call patient [phone] [facility]/sc

## 2024-04-04 ENCOUNTER — TELEPHONE (OUTPATIENT)
Dept: PHARMACY | Age: 69
End: 2024-04-04

## 2024-04-04 NOTE — TELEPHONE ENCOUNTER
2nd attempt to contact patient for new diabetes referral. Phone number was invalid.    Ashia Enrique, PharmD  PGY-1 Pharmacy Resident    4/4/2024   4:22 PM

## 2024-04-21 ENCOUNTER — APPOINTMENT (OUTPATIENT)
Dept: GENERAL RADIOLOGY | Age: 69
DRG: 537 | End: 2024-04-21
Payer: MEDICARE

## 2024-04-21 ENCOUNTER — APPOINTMENT (OUTPATIENT)
Dept: CT IMAGING | Age: 69
DRG: 537 | End: 2024-04-21
Payer: MEDICARE

## 2024-04-21 ENCOUNTER — HOSPITAL ENCOUNTER (INPATIENT)
Age: 69
LOS: 2 days | Discharge: NURSING, REHAB OR LONG-TERM CARE | DRG: 537 | End: 2024-04-23
Attending: EMERGENCY MEDICINE | Admitting: FAMILY MEDICINE
Payer: MEDICARE

## 2024-04-21 DIAGNOSIS — S76.312A TEAR OF LEFT HAMSTRING, INITIAL ENCOUNTER: Primary | ICD-10-CM

## 2024-04-21 LAB
ANION GAP SERPL CALCULATED.3IONS-SCNC: 10 MMOL/L (ref 9–17)
BASOPHILS # BLD: 0.1 K/UL (ref 0–0.2)
BASOPHILS NFR BLD: 1 % (ref 0–2)
BUN SERPL-MCNC: 23 MG/DL (ref 8–23)
CALCIUM SERPL-MCNC: 8.9 MG/DL (ref 8.6–10.4)
CHLORIDE SERPL-SCNC: 105 MMOL/L (ref 98–107)
CO2 SERPL-SCNC: 25 MMOL/L (ref 20–31)
CREAT SERPL-MCNC: 0.7 MG/DL (ref 0.5–0.9)
EOSINOPHIL # BLD: 0.2 K/UL (ref 0–0.4)
EOSINOPHILS RELATIVE PERCENT: 2 % (ref 0–4)
ERYTHROCYTE [DISTWIDTH] IN BLOOD BY AUTOMATED COUNT: 16.2 % (ref 11.5–14.9)
GFR SERPL CREATININE-BSD FRML MDRD: >90 ML/MIN/1.73M2
GLUCOSE BLD-MCNC: 101 MG/DL (ref 65–105)
GLUCOSE SERPL-MCNC: 117 MG/DL (ref 70–99)
HCT VFR BLD AUTO: 48.6 % (ref 36–46)
HGB BLD-MCNC: 15.4 G/DL (ref 12–16)
LYMPHOCYTES NFR BLD: 1.7 K/UL (ref 1–4.8)
LYMPHOCYTES RELATIVE PERCENT: 18 % (ref 24–44)
MCH RBC QN AUTO: 27.9 PG (ref 26–34)
MCHC RBC AUTO-ENTMCNC: 31.6 G/DL (ref 31–37)
MCV RBC AUTO: 88.3 FL (ref 80–100)
MONOCYTES NFR BLD: 0.5 K/UL (ref 0.1–1.3)
MONOCYTES NFR BLD: 5 % (ref 1–7)
NEUTROPHILS NFR BLD: 74 % (ref 36–66)
NEUTS SEG NFR BLD: 6.8 K/UL (ref 1.3–9.1)
PLATELET # BLD AUTO: 131 K/UL (ref 150–450)
PMV BLD AUTO: 8.9 FL (ref 6–12)
POTASSIUM SERPL-SCNC: 4.3 MMOL/L (ref 3.7–5.3)
RBC # BLD AUTO: 5.5 M/UL (ref 4–5.2)
SODIUM SERPL-SCNC: 140 MMOL/L (ref 135–144)
WBC OTHER # BLD: 9.2 K/UL (ref 3.5–11)

## 2024-04-21 PROCEDURE — 96374 THER/PROPH/DIAG INJ IV PUSH: CPT

## 2024-04-21 PROCEDURE — 6360000002 HC RX W HCPCS: Performed by: EMERGENCY MEDICINE

## 2024-04-21 PROCEDURE — 73700 CT LOWER EXTREMITY W/O DYE: CPT

## 2024-04-21 PROCEDURE — 73630 X-RAY EXAM OF FOOT: CPT

## 2024-04-21 PROCEDURE — 85025 COMPLETE CBC W/AUTO DIFF WBC: CPT

## 2024-04-21 PROCEDURE — 6360000002 HC RX W HCPCS: Performed by: FAMILY MEDICINE

## 2024-04-21 PROCEDURE — 73552 X-RAY EXAM OF FEMUR 2/>: CPT

## 2024-04-21 PROCEDURE — 82947 ASSAY GLUCOSE BLOOD QUANT: CPT

## 2024-04-21 PROCEDURE — 36415 COLL VENOUS BLD VENIPUNCTURE: CPT

## 2024-04-21 PROCEDURE — 6370000000 HC RX 637 (ALT 250 FOR IP): Performed by: FAMILY MEDICINE

## 2024-04-21 PROCEDURE — 80048 BASIC METABOLIC PNL TOTAL CA: CPT

## 2024-04-21 PROCEDURE — 99285 EMERGENCY DEPT VISIT HI MDM: CPT

## 2024-04-21 PROCEDURE — 96376 TX/PRO/DX INJ SAME DRUG ADON: CPT

## 2024-04-21 PROCEDURE — 1200000000 HC SEMI PRIVATE

## 2024-04-21 PROCEDURE — 73502 X-RAY EXAM HIP UNI 2-3 VIEWS: CPT

## 2024-04-21 PROCEDURE — 2580000003 HC RX 258: Performed by: FAMILY MEDICINE

## 2024-04-21 RX ORDER — SODIUM CHLORIDE 9 MG/ML
INJECTION, SOLUTION INTRAVENOUS CONTINUOUS
Status: ACTIVE | OUTPATIENT
Start: 2024-04-21 | End: 2024-04-23

## 2024-04-21 RX ORDER — INSULIN LISPRO 100 [IU]/ML
0-8 INJECTION, SOLUTION INTRAVENOUS; SUBCUTANEOUS
Status: DISCONTINUED | OUTPATIENT
Start: 2024-04-22 | End: 2024-04-24 | Stop reason: HOSPADM

## 2024-04-21 RX ORDER — ENOXAPARIN SODIUM 100 MG/ML
30 INJECTION SUBCUTANEOUS 2 TIMES DAILY
Status: DISCONTINUED | OUTPATIENT
Start: 2024-04-21 | End: 2024-04-24 | Stop reason: HOSPADM

## 2024-04-21 RX ORDER — VALSARTAN 320 MG/1
320 TABLET ORAL DAILY
Status: DISCONTINUED | OUTPATIENT
Start: 2024-04-22 | End: 2024-04-24 | Stop reason: HOSPADM

## 2024-04-21 RX ORDER — ONDANSETRON 2 MG/ML
4 INJECTION INTRAMUSCULAR; INTRAVENOUS EVERY 6 HOURS PRN
Status: DISCONTINUED | OUTPATIENT
Start: 2024-04-21 | End: 2024-04-24 | Stop reason: HOSPADM

## 2024-04-21 RX ORDER — ACETAMINOPHEN 650 MG/1
650 SUPPOSITORY RECTAL EVERY 6 HOURS PRN
Status: DISCONTINUED | OUTPATIENT
Start: 2024-04-21 | End: 2024-04-24 | Stop reason: HOSPADM

## 2024-04-21 RX ORDER — MAGNESIUM SULFATE 1 G/100ML
1000 INJECTION INTRAVENOUS PRN
Status: DISCONTINUED | OUTPATIENT
Start: 2024-04-21 | End: 2024-04-24 | Stop reason: HOSPADM

## 2024-04-21 RX ORDER — VALSARTAN AND HYDROCHLOROTHIAZIDE 320; 12.5 MG/1; MG/1
1 TABLET, FILM COATED ORAL DAILY
Status: DISCONTINUED | OUTPATIENT
Start: 2024-04-22 | End: 2024-04-21 | Stop reason: CLARIF

## 2024-04-21 RX ORDER — ALLOPURINOL 300 MG/1
300 TABLET ORAL DAILY
Status: DISCONTINUED | OUTPATIENT
Start: 2024-04-22 | End: 2024-04-24 | Stop reason: HOSPADM

## 2024-04-21 RX ORDER — ALBUTEROL SULFATE 2.5 MG/3ML
2.5 SOLUTION RESPIRATORY (INHALATION) EVERY 6 HOURS PRN
Status: DISCONTINUED | OUTPATIENT
Start: 2024-04-21 | End: 2024-04-24 | Stop reason: HOSPADM

## 2024-04-21 RX ORDER — ASPIRIN 325 MG
325 TABLET ORAL EVERY OTHER DAY
Status: DISCONTINUED | OUTPATIENT
Start: 2024-04-22 | End: 2024-04-24 | Stop reason: HOSPADM

## 2024-04-21 RX ORDER — LANOLIN ALCOHOL/MO/W.PET/CERES
3 CREAM (GRAM) TOPICAL NIGHTLY PRN
Status: DISCONTINUED | OUTPATIENT
Start: 2024-04-21 | End: 2024-04-24 | Stop reason: HOSPADM

## 2024-04-21 RX ORDER — POLYETHYLENE GLYCOL 3350 17 G/17G
17 POWDER, FOR SOLUTION ORAL DAILY PRN
Status: DISCONTINUED | OUTPATIENT
Start: 2024-04-21 | End: 2024-04-24 | Stop reason: HOSPADM

## 2024-04-21 RX ORDER — FLUTICASONE PROPIONATE 50 MCG
2 SPRAY, SUSPENSION (ML) NASAL DAILY
Status: DISCONTINUED | OUTPATIENT
Start: 2024-04-22 | End: 2024-04-24 | Stop reason: HOSPADM

## 2024-04-21 RX ORDER — ACETAMINOPHEN 325 MG/1
650 TABLET ORAL EVERY 6 HOURS PRN
Status: DISCONTINUED | OUTPATIENT
Start: 2024-04-21 | End: 2024-04-24 | Stop reason: HOSPADM

## 2024-04-21 RX ORDER — SODIUM CHLORIDE 9 MG/ML
INJECTION, SOLUTION INTRAVENOUS PRN
Status: DISCONTINUED | OUTPATIENT
Start: 2024-04-21 | End: 2024-04-24 | Stop reason: HOSPADM

## 2024-04-21 RX ORDER — MORPHINE SULFATE 4 MG/ML
4 INJECTION, SOLUTION INTRAMUSCULAR; INTRAVENOUS ONCE
Status: COMPLETED | OUTPATIENT
Start: 2024-04-21 | End: 2024-04-21

## 2024-04-21 RX ORDER — PANTOPRAZOLE SODIUM 40 MG/1
40 TABLET, DELAYED RELEASE ORAL
Status: DISCONTINUED | OUTPATIENT
Start: 2024-04-22 | End: 2024-04-24 | Stop reason: HOSPADM

## 2024-04-21 RX ORDER — SODIUM CHLORIDE 0.9 % (FLUSH) 0.9 %
5-40 SYRINGE (ML) INJECTION EVERY 12 HOURS SCHEDULED
Status: DISCONTINUED | OUTPATIENT
Start: 2024-04-21 | End: 2024-04-24 | Stop reason: HOSPADM

## 2024-04-21 RX ORDER — ONDANSETRON 4 MG/1
4 TABLET, ORALLY DISINTEGRATING ORAL EVERY 8 HOURS PRN
Status: DISCONTINUED | OUTPATIENT
Start: 2024-04-21 | End: 2024-04-24 | Stop reason: HOSPADM

## 2024-04-21 RX ORDER — ALBUTEROL SULFATE 90 UG/1
2 AEROSOL, METERED RESPIRATORY (INHALATION) EVERY 6 HOURS PRN
Status: DISCONTINUED | OUTPATIENT
Start: 2024-04-21 | End: 2024-04-24 | Stop reason: HOSPADM

## 2024-04-21 RX ORDER — CETIRIZINE HYDROCHLORIDE 10 MG/1
10 TABLET ORAL DAILY
Status: DISCONTINUED | OUTPATIENT
Start: 2024-04-22 | End: 2024-04-24 | Stop reason: HOSPADM

## 2024-04-21 RX ORDER — HYDROCHLOROTHIAZIDE 25 MG/1
12.5 TABLET ORAL DAILY
Status: DISCONTINUED | OUTPATIENT
Start: 2024-04-22 | End: 2024-04-24 | Stop reason: HOSPADM

## 2024-04-21 RX ORDER — POTASSIUM CHLORIDE 7.45 MG/ML
10 INJECTION INTRAVENOUS PRN
Status: DISCONTINUED | OUTPATIENT
Start: 2024-04-21 | End: 2024-04-24 | Stop reason: HOSPADM

## 2024-04-21 RX ORDER — FENTANYL CITRATE 0.05 MG/ML
25 INJECTION, SOLUTION INTRAMUSCULAR; INTRAVENOUS EVERY 8 HOURS PRN
Status: DISCONTINUED | OUTPATIENT
Start: 2024-04-21 | End: 2024-04-24 | Stop reason: HOSPADM

## 2024-04-21 RX ORDER — INSULIN GLARGINE 100 [IU]/ML
40 INJECTION, SOLUTION SUBCUTANEOUS 2 TIMES DAILY
Status: DISCONTINUED | OUTPATIENT
Start: 2024-04-21 | End: 2024-04-24 | Stop reason: HOSPADM

## 2024-04-21 RX ORDER — GLIPIZIDE 5 MG/1
10 TABLET ORAL
Status: DISCONTINUED | OUTPATIENT
Start: 2024-04-22 | End: 2024-04-24 | Stop reason: HOSPADM

## 2024-04-21 RX ORDER — INSULIN LISPRO 100 [IU]/ML
0-4 INJECTION, SOLUTION INTRAVENOUS; SUBCUTANEOUS NIGHTLY
Status: DISCONTINUED | OUTPATIENT
Start: 2024-04-21 | End: 2024-04-24 | Stop reason: HOSPADM

## 2024-04-21 RX ORDER — OXYCODONE HYDROCHLORIDE AND ACETAMINOPHEN 5; 325 MG/1; MG/1
1 TABLET ORAL EVERY 6 HOURS PRN
Status: DISCONTINUED | OUTPATIENT
Start: 2024-04-21 | End: 2024-04-24 | Stop reason: HOSPADM

## 2024-04-21 RX ORDER — SODIUM CHLORIDE 0.9 % (FLUSH) 0.9 %
10 SYRINGE (ML) INJECTION PRN
Status: DISCONTINUED | OUTPATIENT
Start: 2024-04-21 | End: 2024-04-24 | Stop reason: HOSPADM

## 2024-04-21 RX ORDER — BUDESONIDE AND FORMOTEROL FUMARATE DIHYDRATE 160; 4.5 UG/1; UG/1
2 AEROSOL RESPIRATORY (INHALATION)
Status: DISCONTINUED | OUTPATIENT
Start: 2024-04-21 | End: 2024-04-24 | Stop reason: HOSPADM

## 2024-04-21 RX ORDER — SOTALOL HYDROCHLORIDE 80 MG/1
80 TABLET ORAL 2 TIMES DAILY
Status: DISCONTINUED | OUTPATIENT
Start: 2024-04-21 | End: 2024-04-24 | Stop reason: HOSPADM

## 2024-04-21 RX ORDER — POTASSIUM CHLORIDE 20 MEQ/1
40 TABLET, EXTENDED RELEASE ORAL PRN
Status: DISCONTINUED | OUTPATIENT
Start: 2024-04-21 | End: 2024-04-24 | Stop reason: HOSPADM

## 2024-04-21 RX ORDER — ROSUVASTATIN CALCIUM 5 MG/1
5 TABLET, COATED ORAL NIGHTLY
Status: DISCONTINUED | OUTPATIENT
Start: 2024-04-21 | End: 2024-04-24 | Stop reason: HOSPADM

## 2024-04-21 RX ORDER — AMLODIPINE BESYLATE 5 MG/1
5 TABLET ORAL DAILY
Status: DISCONTINUED | OUTPATIENT
Start: 2024-04-22 | End: 2024-04-24 | Stop reason: HOSPADM

## 2024-04-21 RX ORDER — DEXTROSE MONOHYDRATE 100 MG/ML
INJECTION, SOLUTION INTRAVENOUS CONTINUOUS PRN
Status: DISCONTINUED | OUTPATIENT
Start: 2024-04-21 | End: 2024-04-24 | Stop reason: HOSPADM

## 2024-04-21 RX ADMIN — ROSUVASTATIN CALCIUM 5 MG: 5 TABLET, COATED ORAL at 23:27

## 2024-04-21 RX ADMIN — MORPHINE SULFATE 4 MG: 4 INJECTION, SOLUTION INTRAMUSCULAR; INTRAVENOUS at 18:48

## 2024-04-21 RX ADMIN — SOTALOL HYDROCHLORIDE 80 MG: 80 TABLET ORAL at 23:26

## 2024-04-21 RX ADMIN — MORPHINE SULFATE 4 MG: 4 INJECTION, SOLUTION INTRAMUSCULAR; INTRAVENOUS at 17:01

## 2024-04-21 RX ADMIN — SODIUM CHLORIDE: 9 INJECTION, SOLUTION INTRAVENOUS at 22:45

## 2024-04-21 RX ADMIN — FENTANYL CITRATE 25 MCG: 0.05 INJECTION, SOLUTION INTRAMUSCULAR; INTRAVENOUS at 23:56

## 2024-04-21 RX ADMIN — ENOXAPARIN SODIUM 30 MG: 100 INJECTION SUBCUTANEOUS at 22:53

## 2024-04-21 RX ADMIN — OXYCODONE AND ACETAMINOPHEN 1 TABLET: 5; 325 TABLET ORAL at 22:35

## 2024-04-21 ASSESSMENT — PAIN DESCRIPTION - DESCRIPTORS
DESCRIPTORS: SHARP
DESCRIPTORS: SPASM;ACHING
DESCRIPTORS: SHARP
DESCRIPTORS: SHARP
DESCRIPTORS: SPASM

## 2024-04-21 ASSESSMENT — PAIN DESCRIPTION - ORIENTATION
ORIENTATION: LEFT

## 2024-04-21 ASSESSMENT — LIFESTYLE VARIABLES
HOW OFTEN DO YOU HAVE A DRINK CONTAINING ALCOHOL: NEVER
HOW MANY STANDARD DRINKS CONTAINING ALCOHOL DO YOU HAVE ON A TYPICAL DAY: PATIENT DOES NOT DRINK

## 2024-04-21 ASSESSMENT — PAIN SCALES - GENERAL
PAINLEVEL_OUTOF10: 9
PAINLEVEL_OUTOF10: 8
PAINLEVEL_OUTOF10: 10
PAINLEVEL_OUTOF10: 10
PAINLEVEL_OUTOF10: 8

## 2024-04-21 ASSESSMENT — PAIN DESCRIPTION - PAIN TYPE
TYPE: ACUTE PAIN
TYPE: ACUTE PAIN

## 2024-04-21 ASSESSMENT — PAIN DESCRIPTION - LOCATION
LOCATION: LEG
LOCATION: HIP;LEG
LOCATION: LEG
LOCATION: LEG;FOOT
LOCATION: LEG;FOOT

## 2024-04-21 ASSESSMENT — PAIN - FUNCTIONAL ASSESSMENT: PAIN_FUNCTIONAL_ASSESSMENT: PREVENTS OR INTERFERES SOME ACTIVE ACTIVITIES AND ADLS

## 2024-04-21 NOTE — ED PROVIDER NOTES
[facility] MED SURG  Emergency Department Encounter  Emergency Medicine Resident     Pt Name:Mara Cortés  MRN: [id number]  Birthdate 1955  Date of evaluation: 24  PCP:  Mihai Gipson MD  Note Started: 4:35 PM EDT      CHIEF COMPLAINT       Chief Complaint   Patient presents with    Fall    Hip Pain       HISTORY OF PRESENT ILLNESS  (Location/Symptom, Timing/Onset, Context/Setting, Quality, Duration, Modifying Factors, Severity.)      Mara Cortés is a 68 y.o. female who presents with left hip pain following a fall.  Patient was at home when she was attempting to climb the stairs to her trailer she fell onto her left hip.  Patient was unable to get up independently for an hour.  When her brother arrived, he was able to help get her up.  Patient did not want to be evaluated at [facility] so she drove here.  Patient having significant left-sided hip pain and thigh pain.  Denies any neck or back pain.  No loss of consciousness.  Not on anticoagulation.    PAST MEDICAL / SURGICAL / SOCIAL / FAMILY HISTORY      has a past medical history of Anesthesia, Asthma, Atrial fibrillation (HCC), CAD (coronary artery disease), Cancer (HCC), COVID-19 vaccine administered, GERD (gastroesophageal reflux disease), Hypertension, Morbid obesity (HCC), Prolonged emergence from general anesthesia, Seasonal allergies, Sleep apnea, Tubular adenoma, and Type II or unspecified type diabetes mellitus without mention of complication, not stated as uncontrolled.       has a past surgical history that includes Appendectomy;  section; bladder suspension; Cholecystectomy; Achilles tendon surgery (Right); Hysterectomy; Upper gastrointestinal endoscopy (7 23 15); ERCP (2015); Knee arthroscopy (Left); skin biopsy; Colonoscopy; Colonoscopy (3/16/16); Colonoscopy (N/A, 2018); eye surgery (Bilateral); Cardiac defibrillator placement (); pacemaker placement (); Carpal tunnel release (Right, 2019); Abdomen

## 2024-04-21 NOTE — ED PROVIDER NOTES
[facility] ED  eMERGENCY dEPARTMENT eNCOUnter   Attending Attestation     Pt Name: Mara Cortés  MRN: [id number]  Birthdate 1955  Date of evaluation: 4/21/24       Mara Cortés is a 68 y.o. female who presents with Fall and Hip Pain      History:   Patient had a fall at home landing on her left leg and is having severe pain mostly in the hip and posterior thigh.  Patient did not hit her head no loss consciousness.  Patient was not able to get up and stand on the leg.    Exam: Vitals:   Vitals:    04/21/24 1637 04/21/24 1643   BP: 130/79    Pulse: 64    Resp: 20    Temp: 97.7 °F (36.5 °C)    TempSrc: Oral    SpO2: 97%    Weight:  117.9 kg (260 lb)     No obvious deformity but severe tenderness to palpation of the left hip with decreased range of motion.    I performed a history and physical examination of the patient and discussed management with the resident. I reviewed the resident’s note and agree with the documented findings and plan of care. Any areas of disagreement are noted on the chart. I was personally present for the key portions of any procedures. I have documented in the chart those procedures where I was not present during the key portions. I have personally reviewed all images and agree with the resident's interpretation. I have reviewed the emergency nurses triage note. I agree with the chief complaint, past medical history, past surgical history, allergies, medications, social and family history as documented unless otherwise noted below. Documentation of the HPI, Physical Exam and Medical Decision Making performed by medical students or scribes is based on my personal performance of the HPI, PE and MDM. I personally evaluated and examined the patient in conjunction with the APC and agree with the assessment, treatment plan, and disposition of the patient as recorded by the APC. Additional findings are as noted.    Chuckie Crawford MD  Attending Emergency  Physician             Ty

## 2024-04-21 NOTE — ED NOTES
Report given to EVAN Lindsey from ED.   Report method by phone   The following was reviewed with receiving RN:   Current vital signs:  /66   Pulse 67   Temp 97.7 °F (36.5 °C) (Oral)   Resp 13   Wt 117.9 kg (260 lb)   SpO2 93%   BMI 47.54 kg/m²                MEWS Score: 1     Any medication or safety alerts were reviewed. Any pending diagnostics and notifications were also reviewed, as well as any safety concerns or issues, abnormal labs, abnormal imaging, and abnormal assessment findings. Questions were answered.

## 2024-04-21 NOTE — ED TRIAGE NOTES
Mode of arrival (squad #, walk in, police, etc) : walk-in        Chief complaint(s): hip pain/fall        Arrival Note (brief scenario, treatment PTA, etc).: Pt reports left hip pain after falling at her trailer at [place], [place]. T reports she didn't want to go to [place] so she drove all the way here.         C= \"Have you ever felt that you should Cut down on your drinking?\"  No  A= \"Have people Annoyed you by criticizing your drinking?\"  No  G= \"Have you ever felt bad or Guilty about your drinking?\"  No  E= \"Have you ever had a drink as an Eye-opener first thing in the morning to steady your nerves or to help a hangover?\"  No      Deferred []      Reason for deferring: N/A    *If yes to two or more: probable alcohol abuse.*

## 2024-04-22 LAB
ALBUMIN SERPL-MCNC: 3.4 G/DL (ref 3.5–5.2)
ALP SERPL-CCNC: 66 U/L (ref 35–104)
ALT SERPL-CCNC: 16 U/L (ref 5–33)
ANION GAP SERPL CALCULATED.3IONS-SCNC: 8 MMOL/L (ref 9–17)
AST SERPL-CCNC: 23 U/L
BILIRUB SERPL-MCNC: 0.4 MG/DL (ref 0.3–1.2)
BUN SERPL-MCNC: 27 MG/DL (ref 8–23)
CALCIUM SERPL-MCNC: 8.8 MG/DL (ref 8.6–10.4)
CHLORIDE SERPL-SCNC: 102 MMOL/L (ref 98–107)
CO2 SERPL-SCNC: 30 MMOL/L (ref 20–31)
CREAT SERPL-MCNC: 0.8 MG/DL (ref 0.5–0.9)
ERYTHROCYTE [DISTWIDTH] IN BLOOD BY AUTOMATED COUNT: 16.5 % (ref 11.5–14.9)
GFR SERPL CREATININE-BSD FRML MDRD: 80 ML/MIN/1.73M2
GLUCOSE BLD-MCNC: 133 MG/DL (ref 65–105)
GLUCOSE BLD-MCNC: 137 MG/DL (ref 65–105)
GLUCOSE BLD-MCNC: 140 MG/DL (ref 65–105)
GLUCOSE BLD-MCNC: 60 MG/DL (ref 65–105)
GLUCOSE BLD-MCNC: 83 MG/DL (ref 65–105)
GLUCOSE SERPL-MCNC: 94 MG/DL (ref 70–99)
HCT VFR BLD AUTO: 44.5 % (ref 36–46)
HGB BLD-MCNC: 14 G/DL (ref 12–16)
MCH RBC QN AUTO: 27.9 PG (ref 26–34)
MCHC RBC AUTO-ENTMCNC: 31.5 G/DL (ref 31–37)
MCV RBC AUTO: 88.5 FL (ref 80–100)
PLATELET # BLD AUTO: 138 K/UL (ref 150–450)
PMV BLD AUTO: 9.7 FL (ref 6–12)
POTASSIUM SERPL-SCNC: 3.6 MMOL/L (ref 3.7–5.3)
PROT SERPL-MCNC: 6.2 G/DL (ref 6.4–8.3)
RBC # BLD AUTO: 5.03 M/UL (ref 4–5.2)
SODIUM SERPL-SCNC: 140 MMOL/L (ref 135–144)
WBC OTHER # BLD: 7.1 K/UL (ref 3.5–11)

## 2024-04-22 PROCEDURE — 97162 PT EVAL MOD COMPLEX 30 MIN: CPT

## 2024-04-22 PROCEDURE — 97166 OT EVAL MOD COMPLEX 45 MIN: CPT

## 2024-04-22 PROCEDURE — 97530 THERAPEUTIC ACTIVITIES: CPT

## 2024-04-22 PROCEDURE — 94640 AIRWAY INHALATION TREATMENT: CPT

## 2024-04-22 PROCEDURE — 36415 COLL VENOUS BLD VENIPUNCTURE: CPT

## 2024-04-22 PROCEDURE — 6370000000 HC RX 637 (ALT 250 FOR IP): Performed by: FAMILY MEDICINE

## 2024-04-22 PROCEDURE — 99223 1ST HOSP IP/OBS HIGH 75: CPT | Performed by: FAMILY MEDICINE

## 2024-04-22 PROCEDURE — 82947 ASSAY GLUCOSE BLOOD QUANT: CPT

## 2024-04-22 PROCEDURE — 97535 SELF CARE MNGMENT TRAINING: CPT

## 2024-04-22 PROCEDURE — 2580000003 HC RX 258: Performed by: FAMILY MEDICINE

## 2024-04-22 PROCEDURE — 85027 COMPLETE CBC AUTOMATED: CPT

## 2024-04-22 PROCEDURE — 80053 COMPREHEN METABOLIC PANEL: CPT

## 2024-04-22 PROCEDURE — 1200000000 HC SEMI PRIVATE

## 2024-04-22 PROCEDURE — 94761 N-INVAS EAR/PLS OXIMETRY MLT: CPT

## 2024-04-22 PROCEDURE — 6360000002 HC RX W HCPCS: Performed by: FAMILY MEDICINE

## 2024-04-22 RX ORDER — TIZANIDINE 4 MG/1
4 TABLET ORAL EVERY 8 HOURS PRN
Status: DISCONTINUED | OUTPATIENT
Start: 2024-04-22 | End: 2024-04-24 | Stop reason: HOSPADM

## 2024-04-22 RX ORDER — KETOROLAC TROMETHAMINE 30 MG/ML
15 INJECTION, SOLUTION INTRAMUSCULAR; INTRAVENOUS EVERY 6 HOURS PRN
Status: DISCONTINUED | OUTPATIENT
Start: 2024-04-22 | End: 2024-04-24 | Stop reason: HOSPADM

## 2024-04-22 RX ADMIN — SOTALOL HYDROCHLORIDE 80 MG: 80 TABLET ORAL at 20:44

## 2024-04-22 RX ADMIN — BUDESONIDE AND FORMOTEROL FUMARATE DIHYDRATE 2 PUFF: 160; 4.5 AEROSOL RESPIRATORY (INHALATION) at 18:55

## 2024-04-22 RX ADMIN — GLIPIZIDE 10 MG: 5 TABLET ORAL at 06:35

## 2024-04-22 RX ADMIN — SOTALOL HYDROCHLORIDE 80 MG: 80 TABLET ORAL at 10:07

## 2024-04-22 RX ADMIN — ENOXAPARIN SODIUM 30 MG: 100 INJECTION SUBCUTANEOUS at 10:04

## 2024-04-22 RX ADMIN — TIZANIDINE 4 MG: 4 TABLET ORAL at 11:39

## 2024-04-22 RX ADMIN — TIZANIDINE 4 MG: 4 TABLET ORAL at 20:33

## 2024-04-22 RX ADMIN — ASPIRIN 325 MG: 325 TABLET ORAL at 09:59

## 2024-04-22 RX ADMIN — PANTOPRAZOLE SODIUM 40 MG: 40 TABLET, DELAYED RELEASE ORAL at 06:35

## 2024-04-22 RX ADMIN — CETIRIZINE HYDROCHLORIDE 10 MG: 10 TABLET, FILM COATED ORAL at 09:59

## 2024-04-22 RX ADMIN — BUDESONIDE AND FORMOTEROL FUMARATE DIHYDRATE 2 PUFF: 160; 4.5 AEROSOL RESPIRATORY (INHALATION) at 08:10

## 2024-04-22 RX ADMIN — OXYCODONE AND ACETAMINOPHEN 1 TABLET: 5; 325 TABLET ORAL at 18:13

## 2024-04-22 RX ADMIN — FLUTICASONE PROPIONATE 2 SPRAY: 50 SPRAY, METERED NASAL at 10:09

## 2024-04-22 RX ADMIN — ROSUVASTATIN CALCIUM 5 MG: 5 TABLET, COATED ORAL at 20:44

## 2024-04-22 RX ADMIN — DEXTROSE MONOHYDRATE 125 ML: 100 INJECTION, SOLUTION INTRAVENOUS at 11:32

## 2024-04-22 RX ADMIN — KETOROLAC TROMETHAMINE 15 MG: 30 INJECTION, SOLUTION INTRAMUSCULAR at 05:31

## 2024-04-22 RX ADMIN — OXYCODONE AND ACETAMINOPHEN 1 TABLET: 5; 325 TABLET ORAL at 04:14

## 2024-04-22 RX ADMIN — OXYCODONE AND ACETAMINOPHEN 1 TABLET: 5; 325 TABLET ORAL at 10:14

## 2024-04-22 RX ADMIN — ENOXAPARIN SODIUM 30 MG: 100 INJECTION SUBCUTANEOUS at 20:44

## 2024-04-22 RX ADMIN — ALLOPURINOL 300 MG: 300 TABLET ORAL at 09:59

## 2024-04-22 ASSESSMENT — PAIN SCALES - GENERAL
PAINLEVEL_OUTOF10: 8
PAINLEVEL_OUTOF10: 6
PAINLEVEL_OUTOF10: 9
PAINLEVEL_OUTOF10: 10
PAINLEVEL_OUTOF10: 6
PAINLEVEL_OUTOF10: 6
PAINLEVEL_OUTOF10: 9

## 2024-04-22 ASSESSMENT — PAIN DESCRIPTION - ORIENTATION
ORIENTATION: LEFT
ORIENTATION: RIGHT
ORIENTATION: LEFT

## 2024-04-22 ASSESSMENT — PAIN DESCRIPTION - LOCATION
LOCATION: HIP;LEG
LOCATION: LEG;HIP
LOCATION: HIP;LEG
LOCATION: HIP
LOCATION: HIP;LEG
LOCATION: LEG

## 2024-04-22 ASSESSMENT — PAIN DESCRIPTION - DESCRIPTORS
DESCRIPTORS: ACHING
DESCRIPTORS: SHARP;STABBING
DESCRIPTORS: SPASM
DESCRIPTORS: STABBING;SPASM

## 2024-04-22 NOTE — CARE COORDINATION
Case Management Assessment  Initial Evaluation    Date/Time of Evaluation: 4/22/2024 10:26 AM  Assessment Completed by: Harriet Ambrose RN    If patient is discharged prior to next notation, then this note serves as note for discharge by case management.    Patient Name: Mara Cortés                   YOB: 1955  Diagnosis: Hamstring tendon rupture, left, initial encounter [S76.312A]  Tear of left hamstring, initial encounter [S76.312A]                   Date / Time: 4/21/2024  4:34 PM    Patient Admission Status: Inpatient   Readmission Risk (Low < 19, Mod (19-27), High > 27): No data recorded  Current PCP: Mihai Gipson MD  PCP verified by CM? Yes    Chart Reviewed: Yes      History Provided by: Patient  Patient Orientation: Alert and Oriented    Patient Cognition: Alert    Hospitalization in the last 30 days (Readmission):  No    If yes, Readmission Assessment in CM Navigator will be completed.    Advance Directives:      Code Status: Full Code   Patient's Primary Decision Maker is: Legal Next of Kin    Primary Decision Maker: Veronica Salter - Child - [phone]    Primary Decision Maker: Iman Gomez - Child - [phone]    Primary Decision Maker: Jeancarlos Guillen - Child - [phone]    Discharge Planning:    Patient lives with: Family Members Type of Home: House  Primary Care Giver: Self  Patient Support Systems include: Children, Family Members   Current Financial resources: Medicare  Current community resources: None  Current services prior to admission: Durable Medical Equipment            Current DME: Walker, Cane, Wheelchair            Type of Home Care services:  PT, OT, Nursing Services    ADLS  Prior functional level: Independent in ADLs/IADLs  Current functional level: Assistance with the following:, Bathing, Dressing, Housework, Shopping, Mobility    PT AM-PAC:   /24  OT AM-PAC:   /24    Family can provide assistance at DC: Yes  Would you like Case Management to discuss the

## 2024-04-22 NOTE — H&P
MG tablet TAKE 1 TABLET BY MOUTH TWICE  DAILY 11/25/23   Heather Hauser APRN - CNP   budesonide-formoterol (SYMBICORT) 160-4.5 MCG/ACT AERO INHALE 2 INHALATIONS BY MOUTH  TWICE DAILY 11/25/23   Heather Hauser APRN - CNP   JARDIANCE 25 MG tablet TAKE 1 TABLET BY MOUTH DAILY 11/25/23   Heather Hauser APRN - CNP   albuterol sulfate HFA (PROVENTIL;VENTOLIN;PROAIR) 108 (90 Base) MCG/ACT inhaler TAKE 2 INHALATIONS BY MOUTH  EVERY 6 HOURS AS NEEDED FOR  WHEEZE 8/31/23   Heather Hauser APRN - CNP   pantoprazole (PROTONIX) 40 MG tablet Take 1 tablet by mouth every morning (before breakfast) 6/5/23   Sherin Moscoso APRN - CNP   albuterol (PROVENTIL) (2.5 MG/3ML) 0.083% nebulizer solution Take 3 mLs by nebulization every 6 hours as needed for Wheezing 1/23/23   Sherin Moscoso APRN - CNP   aspirin 325 MG tablet Take 1 tablet by mouth every other day 1/23/23   Sherin Moscoso APRN - CNP   Insulin Pen Needle 32G X 4 MM MISC 1 each by Does not apply route daily 1/23/23   Sherin Moscoso APRN - CNP   loratadine (CLARITIN) 10 MG capsule Take 1 capsule by mouth daily 1/23/23   Sherin Moscoso APRN - CNP   mometasone (NASONEX) 50 MCG/ACT nasal spray Spray 2 times per nare daily for 90 days 1/23/23   Sherin Moscoso APRN - CNP       Allergies:  Demerol hcl [meperidine], Lipitor [atorvastatin], Metformin and related, and Trulicity [dulaglutide]    Social History:  The patient currently lives at home    TOBACCO:   reports that she has never smoked. She has never used smokeless tobacco.  ETOH:   reports no history of alcohol use.    Review of Systems - Negative except as mentioned in the HPI.      Family History:          Problem Relation Age of Onset    Cancer Father     Cancer Maternal Uncle     Cancer Paternal Uncle         colon    Cancer Paternal Uncle         lung    Cancer Paternal Uncle         pancreas    Cancer Paternal Uncle         pancreas    Cancer Paternal Uncle         pancreas    Cancer Paternal Uncle     Cancer Maternal

## 2024-04-22 NOTE — DISCHARGE INSTRUCTIONS
Orthopedic Instructions:  Weight bearing status: Weight-bear as tolerated with a walker at all times.  Ice (20 minutes on and off 1 hour) darshan reduce swelling and throbbing pain.  Call the office or come to Emergency Room if signs of infection appear (hot, swollen, red, draining pus, fever)  Take medications as prescribed.  Wean off narcotics (percocet/norco) as soon as possible. Do not take tylenol if still taking narcotics.  Follow up with Dr. Hart in his office in 1 month from injury. Call [phone] to schedule/confirm.     all other ROS negative except as per HPI

## 2024-04-22 NOTE — PLAN OF CARE
Patient has a Medtronic Pacemaker/ICD.    [facility] is currently unable to perform MRI's on patients with Medtronic devices.     If device is MRI conditional, pt can transferred to [facility] or [facility] for exam to be done.    MRI informed pt's RN.    Any questions, please call MRI at [phone].

## 2024-04-22 NOTE — PLAN OF CARE
Please have patient or POA answer all questions on MRI screening form in [facility]. Exam will be scheduled after form has been completed and reviewed. Thank you.

## 2024-04-22 NOTE — PLAN OF CARE
Problem: Discharge Planning  Goal: Discharge to home or other facility with appropriate resources  Outcome: Progressing  Flowsheets (Taken 4/22/2024 0318)  Discharge to home or other facility with appropriate resources:   Identify barriers to discharge with patient and caregiver   Arrange for needed discharge resources and transportation as appropriate   Identify discharge learning needs (meds, wound care, etc)   Arrange for interpreters to assist at discharge as needed   Refer to discharge planning if patient needs post-hospital services based on physician order or complex needs related to functional status, cognitive ability or social support system     Problem: Pain  Goal: Verbalizes/displays adequate comfort level or baseline comfort level  Outcome: Progressing  Flowsheets (Taken 4/22/2024 0318)  Verbalizes/displays adequate comfort level or baseline comfort level:   Encourage patient to monitor pain and request assistance   Assess pain using appropriate pain scale   Administer analgesics based on type and severity of pain and evaluate response   Implement non-pharmacological measures as appropriate and evaluate response   Consider cultural and social influences on pain and pain management   Notify Licensed Independent Practitioner if interventions unsuccessful or patient reports new pain     Problem: Safety - Adult  Goal: Free from fall injury  Outcome: Progressing  Flowsheets (Taken 4/22/2024 0318)  Free From Fall Injury: Instruct family/caregiver on patient safety     Problem: ABCDS Injury Assessment  Goal: Absence of physical injury  Outcome: Progressing  Flowsheets (Taken 4/22/2024 0318)  Absence of Physical Injury: Implement safety measures based on patient assessment

## 2024-04-22 NOTE — PLAN OF CARE
Problem: Discharge Planning  Goal: Discharge to home or other facility with appropriate resources  4/22/2024 1504 by Nidhi Bustamante, RN  Outcome: Progressing     Problem: Pain  Goal: Verbalizes/displays adequate comfort level or baseline comfort level  4/22/2024 1504 by Nidhi Bustamante, RN  Outcome: Progressing     Problem: Safety - Adult  Goal: Free from fall injury  4/22/2024 1504 by Nidhi Bustamante, RN  Outcome: Progressing     Problem: ABCDS Injury Assessment  Goal: Absence of physical injury  4/22/2024 1504 by Nidhi Bustamante, RN  Outcome: Progressing

## 2024-04-22 NOTE — DISCHARGE INSTR - COC
Treatments: Skilled Nursing assessment and monitoring. Medication education and monitoring per protocol.       Patient's personal belongings (please select all that are sent with patient):  None    RN SIGNATURE:  Electronically signed by Rosalia Avilez RN on 4/23/24 at 3:37 PM EDT    CASE MANAGEMENT/SOCIAL WORK SECTION    Inpatient Status Date:     Readmission Risk Assessment Score:  Readmission Risk              Risk of Unplanned Readmission:  11           Discharging to Facility/ Agency   [facility] [address]  [address] [place] [address]  P: [phone]  F: [phone]    [facility]  [address] [place] [address]  P: [phone]   F: [phone]      Dialysis Facility (if applicable)   Name:  Address:  Dialysis Schedule:  Phone:  Fax:    / signature: Electronically signed by Harriet Ambrose RN on 4/22/24 at 10:26 AM EDT    PHYSICIAN SECTION    Prognosis: Good    Condition at Discharge: Stable    Rehab Potential (if transferring to Rehab): Good    Recommended Labs or Other Treatments After Discharge:     Physician Certification: I certify the above information and transfer of Mara Cortés  is necessary for the continuing treatment of the diagnosis listed and that she requires Skilled Nursing Facility for less 30 days.     Update Admission H&P: No change in H&P    PHYSICIAN SIGNATURE:  Electronically signed by Hayley York MD on 4/23/24 at 1:57 PM EDT

## 2024-04-22 NOTE — ACP (ADVANCE CARE PLANNING)
Advance Care Planning     Advance Care Planning Activator (Inpatient)  Conversation Note      Date of ACP Conversation: 4/22/2024     Conversation Conducted with: Patient with Decision Making Capacity    ACP Activator: Harriet Ambrose RN    Health Care Decision Maker:     Current Designated Health Care Decision Maker:     Primary Decision Maker: Veronica Salter - Child - [phone]    Primary Decision Maker: Iman Gomez - Child - [phone]    Primary Decision Maker: Gage Guillenian - Child - [phone]  Click here to complete Healthcare Decision Makers including section of the Healthcare Decision Maker Relationship (ie \"Primary\")  Today we documented Decision Maker(s) consistent with Legal Next of Kin hierarchy.    Care Preferences    Ventilation:  \"If you were in your present state of health and suddenly became very ill and were unable to breathe on your own, what would your preference be about the use of a ventilator (breathing machine) if it were available to you?\"      Would the patient desire the use of ventilator (breathing machine)?: yes    \"If your health worsens and it becomes clear that your chance of recovery is unlikely, what would your preference be about the use of a ventilator (breathing machine) if it were available to you?\"     Would the patient desire the use of ventilator (breathing machine)?: No      Resuscitation  \"CPR works best to restart the heart when there is a sudden event, like a heart attack, in someone who is otherwise healthy. Unfortunately, CPR does not typically restart the heart for people who have serious health conditions or who are very sick.\"    \"In the event your heart stopped as a result of an underlying serious health condition, would you want attempts to be made to restart your heart (answer \"yes\" for attempt to resuscitate) or would you prefer a natural death (answer \"no\" for do not attempt to resuscitate)?\" yes       [] Yes   [] No   Educated Patient / Decision

## 2024-04-22 NOTE — CONSULTS
Orthopedic Consult      CHIEF COMPLAINT: Left hip pain    HISTORY OF PRESENT ILLNESS:      The patient is a 68 y.o. female who presented to [facility] ED after sustaining a left hip injury when she states she was getting her trailer ready on [place], when she tripped over a metal post, and her left leg hyperextended towards her head.  She sustained a small cut to her left foot on the lateral aspect.  Imaging performed the ED, demonstrated hamstring avulsion injury with hematoma present.  Patient denies any prior history of left lower extremity injury in the past.  Denies any numbness or tingling.  Patient does have diabetes at baseline.  Patient ambulates without any assistive devices.    Past Medical History:    Past Medical History:   Diagnosis Date    Anesthesia     severe breathing issues(stopped breathing) after EGD    Asthma     Atrial fibrillation (HCC)     had ablation and pacemaker/defibrillator.     CAD (coronary artery disease)     pt denies    Cancer (HCC)     skin on face    COVID-19 vaccine administered 2021 & 2021    PFIZER    GERD (gastroesophageal reflux disease)     Hypertension     Morbid obesity (HCC)     Prolonged emergence from general anesthesia     Seasonal allergies     Sleep apnea     uses cpap    Tubular adenoma     Type II or unspecified type diabetes mellitus without mention of complication, not stated as uncontrolled        Past Surgical History:    Past Surgical History:   Procedure Laterality Date    ABDOMEN SURGERY      abdominal adhesion surgery on bowel    ACHILLES TENDON SURGERY Right     APPENDECTOMY      ARM SURGERY Left 10/20/2020    EXCISION LIPOMA FOREARM X 3 performed by Stas Nur MD at [facility] OR    ATRIAL ABLATION SURGERY      2012    BLADDER SUSPENSION      CARDIAC DEFIBRILLATOR PLACEMENT  2012    AICD    CARPAL TUNNEL RELEASE Right 2019    CARPAL TUNNEL RELEASE performed by Don Botello MD at [facility] OR     SECTION      x3

## 2024-04-23 VITALS
DIASTOLIC BLOOD PRESSURE: 86 MMHG | WEIGHT: 260 LBS | TEMPERATURE: 99.3 F | HEART RATE: 72 BPM | RESPIRATION RATE: 18 BRPM | BODY MASS INDEX: 47.54 KG/M2 | SYSTOLIC BLOOD PRESSURE: 138 MMHG | OXYGEN SATURATION: 94 %

## 2024-04-23 LAB
GLUCOSE BLD-MCNC: 118 MG/DL (ref 65–105)
GLUCOSE BLD-MCNC: 144 MG/DL (ref 65–105)
GLUCOSE BLD-MCNC: 145 MG/DL (ref 65–105)
GLUCOSE BLD-MCNC: 77 MG/DL (ref 65–105)

## 2024-04-23 PROCEDURE — 82947 ASSAY GLUCOSE BLOOD QUANT: CPT

## 2024-04-23 PROCEDURE — 6360000002 HC RX W HCPCS: Performed by: FAMILY MEDICINE

## 2024-04-23 PROCEDURE — 6370000000 HC RX 637 (ALT 250 FOR IP): Performed by: FAMILY MEDICINE

## 2024-04-23 PROCEDURE — 99239 HOSP IP/OBS DSCHRG MGMT >30: CPT | Performed by: FAMILY MEDICINE

## 2024-04-23 PROCEDURE — 94640 AIRWAY INHALATION TREATMENT: CPT

## 2024-04-23 PROCEDURE — 97110 THERAPEUTIC EXERCISES: CPT

## 2024-04-23 PROCEDURE — 97530 THERAPEUTIC ACTIVITIES: CPT

## 2024-04-23 PROCEDURE — 94761 N-INVAS EAR/PLS OXIMETRY MLT: CPT

## 2024-04-23 PROCEDURE — 97116 GAIT TRAINING THERAPY: CPT

## 2024-04-23 RX ORDER — LANOLIN ALCOHOL/MO/W.PET/CERES
3 CREAM (GRAM) TOPICAL NIGHTLY PRN
Qty: 5 TABLET | Refills: 0
Start: 2024-04-23

## 2024-04-23 RX ORDER — KETOROLAC TROMETHAMINE 30 MG/ML
15 INJECTION, SOLUTION INTRAMUSCULAR; INTRAVENOUS EVERY 6 HOURS PRN
Qty: 8 ML | Refills: 0
Start: 2024-04-23 | End: 2024-04-27

## 2024-04-23 RX ORDER — TIZANIDINE 4 MG/1
4 TABLET ORAL EVERY 8 HOURS PRN
Qty: 15 TABLET | Refills: 0
Start: 2024-04-23

## 2024-04-23 RX ADMIN — EMPAGLIFLOZIN 25 MG: 10 TABLET, FILM COATED ORAL at 07:33

## 2024-04-23 RX ADMIN — SOTALOL HYDROCHLORIDE 80 MG: 80 TABLET ORAL at 07:36

## 2024-04-23 RX ADMIN — BUDESONIDE AND FORMOTEROL FUMARATE DIHYDRATE 2 PUFF: 160; 4.5 AEROSOL RESPIRATORY (INHALATION) at 19:04

## 2024-04-23 RX ADMIN — BUDESONIDE AND FORMOTEROL FUMARATE DIHYDRATE 2 PUFF: 160; 4.5 AEROSOL RESPIRATORY (INHALATION) at 06:58

## 2024-04-23 RX ADMIN — CETIRIZINE HYDROCHLORIDE 10 MG: 10 TABLET, FILM COATED ORAL at 07:33

## 2024-04-23 RX ADMIN — ENOXAPARIN SODIUM 30 MG: 100 INJECTION SUBCUTANEOUS at 07:34

## 2024-04-23 RX ADMIN — AMLODIPINE BESYLATE 5 MG: 5 TABLET ORAL at 07:33

## 2024-04-23 RX ADMIN — SOTALOL HYDROCHLORIDE 80 MG: 80 TABLET ORAL at 20:35

## 2024-04-23 RX ADMIN — GLIPIZIDE 10 MG: 5 TABLET ORAL at 07:22

## 2024-04-23 RX ADMIN — PANTOPRAZOLE SODIUM 40 MG: 40 TABLET, DELAYED RELEASE ORAL at 07:22

## 2024-04-23 RX ADMIN — TIZANIDINE 4 MG: 4 TABLET ORAL at 20:35

## 2024-04-23 RX ADMIN — ROSUVASTATIN CALCIUM 5 MG: 5 TABLET, COATED ORAL at 20:35

## 2024-04-23 RX ADMIN — HYDROCHLOROTHIAZIDE 12.5 MG: 25 TABLET ORAL at 07:33

## 2024-04-23 RX ADMIN — ENOXAPARIN SODIUM 30 MG: 100 INJECTION SUBCUTANEOUS at 20:34

## 2024-04-23 RX ADMIN — GLIPIZIDE 10 MG: 5 TABLET ORAL at 16:54

## 2024-04-23 RX ADMIN — FLUTICASONE PROPIONATE 2 SPRAY: 50 SPRAY, METERED NASAL at 07:35

## 2024-04-23 RX ADMIN — INSULIN GLARGINE 40 UNITS: 100 INJECTION, SOLUTION SUBCUTANEOUS at 07:35

## 2024-04-23 RX ADMIN — VALSARTAN 320 MG: 320 TABLET ORAL at 07:36

## 2024-04-23 RX ADMIN — KETOROLAC TROMETHAMINE 15 MG: 30 INJECTION, SOLUTION INTRAMUSCULAR at 11:26

## 2024-04-23 RX ADMIN — ALLOPURINOL 300 MG: 300 TABLET ORAL at 07:33

## 2024-04-23 RX ADMIN — OXYCODONE AND ACETAMINOPHEN 1 TABLET: 5; 325 TABLET ORAL at 00:46

## 2024-04-23 RX ADMIN — TIZANIDINE 4 MG: 4 TABLET ORAL at 07:22

## 2024-04-23 RX ADMIN — OXYCODONE AND ACETAMINOPHEN 1 TABLET: 5; 325 TABLET ORAL at 19:46

## 2024-04-23 ASSESSMENT — PAIN DESCRIPTION - LOCATION
LOCATION: HIP

## 2024-04-23 ASSESSMENT — PAIN DESCRIPTION - ORIENTATION
ORIENTATION: LEFT
ORIENTATION: LEFT
ORIENTATION: RIGHT
ORIENTATION: LEFT

## 2024-04-23 ASSESSMENT — PAIN SCALES - GENERAL
PAINLEVEL_OUTOF10: 8
PAINLEVEL_OUTOF10: 5
PAINLEVEL_OUTOF10: 8
PAINLEVEL_OUTOF10: 6
PAINLEVEL_OUTOF10: 8
PAINLEVEL_OUTOF10: 9

## 2024-04-23 ASSESSMENT — PAIN DESCRIPTION - DESCRIPTORS
DESCRIPTORS: ACHING
DESCRIPTORS: SPASM
DESCRIPTORS: ACHING

## 2024-04-23 ASSESSMENT — PAIN SCALES - WONG BAKER: WONGBAKER_NUMERICALRESPONSE: HURTS LITTLE MORE

## 2024-04-23 NOTE — PLAN OF CARE
Problem: Discharge Planning  Goal: Discharge to home or other facility with appropriate resources  4/23/2024 0301 by Burak Chery, RN  Outcome: Progressing     Problem: Pain  Goal: Verbalizes/displays adequate comfort level or baseline comfort level  4/23/2024 0301 by Burak Chery, RN  Outcome: Progressing     Problem: Safety - Adult  Goal: Free from fall injury  4/23/2024 0301 by Burak Chery, RN  Outcome: Progressing

## 2024-04-23 NOTE — CARE COORDINATION
Writer spoke with Mallika at [facility]. They are able to accept patient when medically ready for discharge. Bed available as soon as today

## 2024-04-23 NOTE — CARE COORDINATION
Transportation arranged for patient to go to [facility] at 830p via [facility]. Facility informed. Bedside nurse informed.     Number for Report: [phone]

## 2024-04-24 ENCOUNTER — HOSPITAL ENCOUNTER (OUTPATIENT)
Age: 69
Setting detail: SPECIMEN
Discharge: HOME | End: 2024-04-24

## 2024-04-24 LAB
ALBUMIN SERPL-MCNC: 3.7 G/DL (ref 3.5–5.2)
ALP SERPL-CCNC: 78 U/L (ref 35–104)
ALT SERPL-CCNC: 18 U/L (ref 5–33)
ANION GAP SERPL CALCULATED.3IONS-SCNC: 10 MMOL/L (ref 9–17)
AST SERPL-CCNC: 24 U/L
BILIRUB SERPL-MCNC: 0.3 MG/DL (ref 0.3–1.2)
BUN SERPL-MCNC: 23 MG/DL (ref 8–23)
BUN/CREAT SERPL: 26 (ref 9–20)
CALCIUM SERPL-MCNC: 9.3 MG/DL (ref 8.6–10.4)
CHLORIDE SERPL-SCNC: 101 MMOL/L (ref 98–107)
CO2 SERPL-SCNC: 30 MMOL/L (ref 20–31)
CREAT SERPL-MCNC: 0.9 MG/DL (ref 0.5–0.9)
ERYTHROCYTE [DISTWIDTH] IN BLOOD BY AUTOMATED COUNT: 15.8 % (ref 11.8–14.4)
GFR SERPL CREATININE-BSD FRML MDRD: 69 ML/MIN/1.73M2
GLUCOSE SERPL-MCNC: 131 MG/DL (ref 70–99)
HCT VFR BLD AUTO: 45.7 % (ref 36.3–47.1)
HGB BLD-MCNC: 13.9 G/DL (ref 11.9–15.1)
MCH RBC QN AUTO: 27.6 PG (ref 25.2–33.5)
MCHC RBC AUTO-ENTMCNC: 30.4 G/DL (ref 28.4–34.8)
MCV RBC AUTO: 90.9 FL (ref 82.6–102.9)
NRBC BLD-RTO: 0 PER 100 WBC
PLATELET # BLD AUTO: 143 K/UL (ref 138–453)
PMV BLD AUTO: 12.9 FL (ref 8.1–13.5)
POTASSIUM SERPL-SCNC: 4.6 MMOL/L (ref 3.7–5.3)
PROT SERPL-MCNC: 6.5 G/DL (ref 6.4–8.3)
RBC # BLD AUTO: 5.03 M/UL (ref 3.95–5.11)
SODIUM SERPL-SCNC: 141 MMOL/L (ref 135–144)
WBC OTHER # BLD: 6 K/UL (ref 3.5–11.3)

## 2024-04-24 PROCEDURE — P9603 ONE-WAY ALLOW PRORATED MILES: HCPCS

## 2024-04-24 PROCEDURE — 80053 COMPREHEN METABOLIC PANEL: CPT

## 2024-04-24 PROCEDURE — 36415 COLL VENOUS BLD VENIPUNCTURE: CPT

## 2024-04-24 PROCEDURE — 85027 COMPLETE CBC AUTOMATED: CPT

## 2024-04-24 NOTE — PLAN OF CARE
Problem: Discharge Planning  Goal: Discharge to home or other facility with appropriate resources  Outcome: Completed     Problem: Pain  Goal: Verbalizes/displays adequate comfort level or baseline comfort level  Outcome: Completed     Problem: Safety - Adult  Goal: Free from fall injury  Outcome: Completed     Problem: ABCDS Injury Assessment  Goal: Absence of physical injury  Outcome: Completed

## 2024-04-26 ENCOUNTER — HOSPITAL ENCOUNTER (OUTPATIENT)
Age: 69
Setting detail: SPECIMEN
Discharge: HOME | End: 2024-04-26

## 2024-04-26 LAB
ANION GAP SERPL CALCULATED.3IONS-SCNC: 9 MMOL/L (ref 9–17)
BUN SERPL-MCNC: 17 MG/DL (ref 8–23)
BUN/CREAT SERPL: 28 (ref 9–20)
CALCIUM SERPL-MCNC: 9.3 MG/DL (ref 8.6–10.4)
CHLORIDE SERPL-SCNC: 100 MMOL/L (ref 98–107)
CO2 SERPL-SCNC: 29 MMOL/L (ref 20–31)
CREAT SERPL-MCNC: 0.6 MG/DL (ref 0.5–0.9)
ERYTHROCYTE [DISTWIDTH] IN BLOOD BY AUTOMATED COUNT: 15.4 % (ref 11.8–14.4)
GFR SERPL CREATININE-BSD FRML MDRD: >90 ML/MIN/1.73M2
GLUCOSE SERPL-MCNC: 82 MG/DL (ref 70–99)
HCT VFR BLD AUTO: 44.1 % (ref 36.3–47.1)
HGB BLD-MCNC: 13.6 G/DL (ref 11.9–15.1)
MCH RBC QN AUTO: 27.9 PG (ref 25.2–33.5)
MCHC RBC AUTO-ENTMCNC: 30.8 G/DL (ref 28.4–34.8)
MCV RBC AUTO: 90.6 FL (ref 82.6–102.9)
NRBC BLD-RTO: 0 PER 100 WBC
PLATELET # BLD AUTO: 144 K/UL (ref 138–453)
PMV BLD AUTO: 12.1 FL (ref 8.1–13.5)
POTASSIUM SERPL-SCNC: 4 MMOL/L (ref 3.7–5.3)
RBC # BLD AUTO: 4.87 M/UL (ref 3.95–5.11)
SODIUM SERPL-SCNC: 138 MMOL/L (ref 135–144)
WBC OTHER # BLD: 5.1 K/UL (ref 3.5–11.3)

## 2024-04-26 PROCEDURE — 36415 COLL VENOUS BLD VENIPUNCTURE: CPT

## 2024-04-26 PROCEDURE — 85027 COMPLETE CBC AUTOMATED: CPT

## 2024-04-26 PROCEDURE — P9603 ONE-WAY ALLOW PRORATED MILES: HCPCS

## 2024-04-26 PROCEDURE — 80048 BASIC METABOLIC PNL TOTAL CA: CPT

## 2024-04-30 ENCOUNTER — HOSPITAL ENCOUNTER (OUTPATIENT)
Age: 69
Setting detail: SPECIMEN
Discharge: HOME | End: 2024-04-30

## 2024-04-30 LAB
ANION GAP SERPL CALCULATED.3IONS-SCNC: 8 MMOL/L (ref 9–17)
BUN SERPL-MCNC: 26 MG/DL (ref 8–23)
BUN/CREAT SERPL: 33 (ref 9–20)
CALCIUM SERPL-MCNC: 9.5 MG/DL (ref 8.6–10.4)
CHLORIDE SERPL-SCNC: 98 MMOL/L (ref 98–107)
CO2 SERPL-SCNC: 31 MMOL/L (ref 20–31)
CREAT SERPL-MCNC: 0.8 MG/DL (ref 0.5–0.9)
ERYTHROCYTE [DISTWIDTH] IN BLOOD BY AUTOMATED COUNT: 16 % (ref 11.8–14.4)
GFR SERPL CREATININE-BSD FRML MDRD: 80 ML/MIN/1.73M2
GLUCOSE SERPL-MCNC: 132 MG/DL (ref 70–99)
HCT VFR BLD AUTO: 47.9 % (ref 36.3–47.1)
HGB BLD-MCNC: 14.7 G/DL (ref 11.9–15.1)
MCH RBC QN AUTO: 27.9 PG (ref 25.2–33.5)
MCHC RBC AUTO-ENTMCNC: 30.7 G/DL (ref 28.4–34.8)
MCV RBC AUTO: 90.9 FL (ref 82.6–102.9)
NRBC BLD-RTO: 0 PER 100 WBC
PLATELET # BLD AUTO: 165 K/UL (ref 138–453)
PMV BLD AUTO: 12.1 FL (ref 8.1–13.5)
POTASSIUM SERPL-SCNC: 4.3 MMOL/L (ref 3.7–5.3)
RBC # BLD AUTO: 5.27 M/UL (ref 3.95–5.11)
SODIUM SERPL-SCNC: 137 MMOL/L (ref 135–144)
WBC OTHER # BLD: 7.7 K/UL (ref 3.5–11.3)

## 2024-04-30 PROCEDURE — 36415 COLL VENOUS BLD VENIPUNCTURE: CPT

## 2024-04-30 PROCEDURE — 85027 COMPLETE CBC AUTOMATED: CPT

## 2024-04-30 PROCEDURE — P9603 ONE-WAY ALLOW PRORATED MILES: HCPCS

## 2024-04-30 PROCEDURE — 80048 BASIC METABOLIC PNL TOTAL CA: CPT

## 2024-05-01 ENCOUNTER — HOSPITAL ENCOUNTER (OUTPATIENT)
Age: 69
Setting detail: SPECIMEN
Discharge: HOME | End: 2024-05-01

## 2024-05-01 LAB
ANION GAP SERPL CALCULATED.3IONS-SCNC: 7 MMOL/L (ref 9–17)
BUN SERPL-MCNC: 27 MG/DL (ref 8–23)
BUN/CREAT SERPL: 45 (ref 9–20)
CALCIUM SERPL-MCNC: 9.8 MG/DL (ref 8.6–10.4)
CHLORIDE SERPL-SCNC: 100 MMOL/L (ref 98–107)
CO2 SERPL-SCNC: 31 MMOL/L (ref 20–31)
CREAT SERPL-MCNC: 0.6 MG/DL (ref 0.5–0.9)
ERYTHROCYTE [DISTWIDTH] IN BLOOD BY AUTOMATED COUNT: 16.4 % (ref 11.8–14.4)
GFR, ESTIMATED: >90 ML/MIN/1.73M2
GLUCOSE SERPL-MCNC: 92 MG/DL (ref 70–99)
HCT VFR BLD AUTO: 47.8 % (ref 36.3–47.1)
HGB BLD-MCNC: 14.8 G/DL (ref 11.9–15.1)
MCH RBC QN AUTO: 27.8 PG (ref 25.2–33.5)
MCHC RBC AUTO-ENTMCNC: 31 G/DL (ref 28.4–34.8)
MCV RBC AUTO: 89.8 FL (ref 82.6–102.9)
NRBC BLD-RTO: 0 PER 100 WBC
PLATELET # BLD AUTO: 167 K/UL (ref 138–453)
PMV BLD AUTO: 11.8 FL (ref 8.1–13.5)
POTASSIUM SERPL-SCNC: 4.4 MMOL/L (ref 3.7–5.3)
RBC # BLD AUTO: 5.32 M/UL (ref 3.95–5.11)
SODIUM SERPL-SCNC: 138 MMOL/L (ref 135–144)
WBC OTHER # BLD: 7.3 K/UL (ref 3.5–11.3)

## 2024-05-01 PROCEDURE — 80048 BASIC METABOLIC PNL TOTAL CA: CPT

## 2024-05-01 PROCEDURE — 36415 COLL VENOUS BLD VENIPUNCTURE: CPT

## 2024-05-01 PROCEDURE — P9603 ONE-WAY ALLOW PRORATED MILES: HCPCS

## 2024-05-01 PROCEDURE — 85027 COMPLETE CBC AUTOMATED: CPT

## 2024-05-10 ENCOUNTER — HOSPITAL ENCOUNTER (OUTPATIENT)
Age: 69
Setting detail: SPECIMEN
Discharge: HOME | End: 2024-05-10

## 2024-05-20 ENCOUNTER — OFFICE VISIT (OUTPATIENT)
Dept: ORTHOPEDIC SURGERY | Age: 69
End: 2024-05-20
Payer: MEDICARE

## 2024-05-20 VITALS — BODY MASS INDEX: 46.19 KG/M2 | HEIGHT: 62 IN | WEIGHT: 251 LBS

## 2024-05-20 DIAGNOSIS — S76.312A TEAR OF LEFT HAMSTRING, INITIAL ENCOUNTER: Primary | ICD-10-CM

## 2024-05-20 PROCEDURE — 1036F TOBACCO NON-USER: CPT | Performed by: STUDENT IN AN ORGANIZED HEALTH CARE EDUCATION/TRAINING PROGRAM

## 2024-05-20 PROCEDURE — 1123F ACP DISCUSS/DSCN MKR DOCD: CPT | Performed by: STUDENT IN AN ORGANIZED HEALTH CARE EDUCATION/TRAINING PROGRAM

## 2024-05-20 PROCEDURE — 1111F DSCHRG MED/CURRENT MED MERGE: CPT | Performed by: STUDENT IN AN ORGANIZED HEALTH CARE EDUCATION/TRAINING PROGRAM

## 2024-05-20 PROCEDURE — 1090F PRES/ABSN URINE INCON ASSESS: CPT | Performed by: STUDENT IN AN ORGANIZED HEALTH CARE EDUCATION/TRAINING PROGRAM

## 2024-05-20 PROCEDURE — G8417 CALC BMI ABV UP PARAM F/U: HCPCS | Performed by: STUDENT IN AN ORGANIZED HEALTH CARE EDUCATION/TRAINING PROGRAM

## 2024-05-20 PROCEDURE — 3017F COLORECTAL CA SCREEN DOC REV: CPT | Performed by: STUDENT IN AN ORGANIZED HEALTH CARE EDUCATION/TRAINING PROGRAM

## 2024-05-20 PROCEDURE — 99213 OFFICE O/P EST LOW 20 MIN: CPT | Performed by: STUDENT IN AN ORGANIZED HEALTH CARE EDUCATION/TRAINING PROGRAM

## 2024-05-20 PROCEDURE — G8399 PT W/DXA RESULTS DOCUMENT: HCPCS | Performed by: STUDENT IN AN ORGANIZED HEALTH CARE EDUCATION/TRAINING PROGRAM

## 2024-05-20 PROCEDURE — G8427 DOCREV CUR MEDS BY ELIG CLIN: HCPCS | Performed by: STUDENT IN AN ORGANIZED HEALTH CARE EDUCATION/TRAINING PROGRAM

## 2024-05-20 NOTE — PROGRESS NOTES
to work on core strengthening, hamstring stretching and strengthening left lower extremity.  Patient may discontinue walker use as patient feels comfortable.  Patient will follow-up in 2 months for reevaluation.  All questions answered.  Patient is amenable to this plan.        Electronically signed by Lc Hart DO on 5/20/2024 at 2:28 PM    This note is created with the assistance of a speech recognition program.  While intending to generate a document that actually reflects the content of the visit, the document can still have some errors including those of syntax and sound a like substitutions which may escape proof reading.  In such instances, actual meaning can be extrapolated by contextual diversion

## 2024-08-21 ENCOUNTER — HOSPITAL ENCOUNTER (OUTPATIENT)
Age: 69
Setting detail: SPECIMEN
Discharge: HOME | End: 2024-08-21

## 2024-08-21 DIAGNOSIS — Z79.4 TYPE 2 DIABETES MELLITUS WITH DIABETIC POLYNEUROPATHY, WITH LONG-TERM CURRENT USE OF INSULIN (HCC): ICD-10-CM

## 2024-08-21 DIAGNOSIS — E11.42 TYPE 2 DIABETES MELLITUS WITH DIABETIC POLYNEUROPATHY, WITH LONG-TERM CURRENT USE OF INSULIN (HCC): ICD-10-CM

## 2024-08-21 LAB
ANION GAP SERPL CALCULATED.3IONS-SCNC: 10 MMOL/L (ref 9–16)
BUN SERPL-MCNC: 20 MG/DL (ref 8–23)
CALCIUM SERPL-MCNC: 10.4 MG/DL (ref 8.6–10.4)
CHLORIDE SERPL-SCNC: 100 MMOL/L (ref 98–107)
CO2 SERPL-SCNC: 30 MMOL/L (ref 20–31)
CREAT SERPL-MCNC: 0.9 MG/DL (ref 0.5–0.9)
CREAT UR-MCNC: 81.1 MG/DL (ref 28–217)
GFR, ESTIMATED: 72 ML/MIN/1.73M2
GLUCOSE SERPL-MCNC: 133 MG/DL (ref 74–99)
MICROALBUMIN UR-MCNC: <12 MG/L (ref 0–20)
MICROALBUMIN/CREAT UR-RTO: NORMAL MCG/MG CREAT (ref 0–25)
POTASSIUM SERPL-SCNC: 4.8 MMOL/L (ref 3.7–5.3)
SODIUM SERPL-SCNC: 140 MMOL/L (ref 136–145)

## 2024-09-04 NOTE — PROGRESS NOTES
----- Message from Edilberto Marin DO sent at 9/3/2024  8:07 AM CDT -----  Worsening renal function  Hold metformin 2 weeks  Decrease lasix 20mg daily   Labs in 3 weeks  
Called and left message for Dr Hart informing him that patient is unable to have a MRI due to pacemaker and defibrillator.   
[facility]   Occupational Therapy Evaluation  Date: 24  Patient Name: Mara Cortés       Room: -  MRN: [id number]  Account: [id number]   : 1955  (68 y.o.) Gender: female     Discharge Recommendations:  Further Occupational Therapy is recommended upon facility discharge.    OT Equipment Recommendations  Other: TBD    Referring Practitioner: Hayley York MD  Diagnosis: Hamstring tendon rupture, left     Additional Pertinent Hx: Per ortho consult : The patient is a 68 y.o. female who presented to [facility] ED after sustaining a left hip injury when she states she was getting her trailer ready [place], when she tripped over a metal post, and her left leg hyperextended towards her head.  She sustained a small cut to her left foot on the lateral aspect.  Imaging performed the ED, demonstrated hamstring avulsion injury with hematoma present.  Patient denies any prior history of left lower extremity injury in the past.  Denies any numbness or tingling.  Patient does have diabetes at baseline.  Patient ambulates without any assistive devices.    Treatment Diagnosis: Impaired self-care status    Past Medical History:  has a past medical history of Anesthesia, Asthma, Atrial fibrillation (HCC), CAD (coronary artery disease), Cancer ([facility]), COVID-19 vaccine administered, GERD (gastroesophageal reflux disease), Hypertension, Morbid obesity (HCC), Prolonged emergence from general anesthesia, Seasonal allergies, Sleep apnea, Tubular adenoma, and Type II or unspecified type diabetes mellitus without mention of complication, not stated as uncontrolled.    Past Surgical History:   has a past surgical history that includes Appendectomy;  section; bladder suspension; Cholecystectomy; Achilles tendon surgery (Right); Hysterectomy; Upper gastrointestinal endoscopy (7 23 15); ERCP (2015); Knee arthroscopy (Left); skin biopsy; Colonoscopy; Colonoscopy 
Physical Therapy  Facility/Department: [facility] MED SURG  Daily Treatment Note  NAME: Mara Cortés  : 1955  MRN: [id number]    Date of Service: 2024    Discharge Recommendations:  Therapy recommended at discharge, Patient would benefit from continued therapy after discharge   PT Equipment Recommendations  Equipment Needed: Yes  Mobility Devices: Walker  Other: RW    Patient Diagnosis(es): The encounter diagnosis was Tear of left hamstring, initial encounter.    Assessment   Activity Tolerance: Patient limited by pain;Patient limited by fatigue  Equipment Needed: Yes  Mobility Devices: Walker  Other: RW     Plan    Physical Therapy Plan  General Plan: 1 time a day 7 days a week  Specific Instructions for Next Treatment: progress transfers, and ambulation. monitor BP  Current Treatment Recommendations: Strengthening;ROM;Balance training;Functional mobility training;Transfer training;Endurance training;Gait training;Pain management;Home exercise program;Safety education & training;Patient/Caregiver education & training;Equipment evaluation, education, & procurement;Therapeutic activities;Wheelchair mobility training     Restrictions  Restrictions/Precautions  Restrictions/Precautions: General Precautions, Contact Precautions, Other (comment), Up as Tolerated (Per Dr. Hart Protected weightbearing left lower extremity with a walker. up with assistance)  Required Braces or Orthoses?: No  Implants present? : Pacemaker  Position Activity Restriction  Other position/activity restrictions: Per ortho, No acute surgical intervention at this time. Will order MRI, to assess for extent of retraction, and muscle injury. Protected weightbearing left lower extremity with a walker.     Subjective    Subjective  Subjective: Pt resting in bed on PT/OT arrival. \"Oh good I've been waiting for you to come\". Pt very pleasant and cooperative with therapy. Co-tx with Garrett Zapata S/CAM  Pain: reports pain 7/10 in lower 
Physical Therapy  Facility/Department: [facility] MED SURG  Physical Therapy Initial Assessment    Name: Mara Cortés  : 1955  MRN: [id number]  Date of Service: 2024    Discharge Recommendations:  Therapy recommended at discharge, Patient would benefit from continued therapy after discharge   PT Equipment Recommendations  Equipment Needed: Yes  Mobility Devices: Walker  Other: RW      Patient Diagnosis(es): The encounter diagnosis was Tear of left hamstring, initial encounter.  Past Medical History:  has a past medical history of Anesthesia, Asthma, Atrial fibrillation (HCC), CAD (coronary artery disease), Cancer (HCC), COVID-19 vaccine administered, GERD (gastroesophageal reflux disease), Hypertension, Morbid obesity (HCC), Prolonged emergence from general anesthesia, Seasonal allergies, Sleep apnea, Tubular adenoma, and Type II or unspecified type diabetes mellitus without mention of complication, not stated as uncontrolled.  Past Surgical History:  has a past surgical history that includes Appendectomy;  section; bladder suspension; Cholecystectomy; Achilles tendon surgery (Right); Hysterectomy; Upper gastrointestinal endoscopy (7 23 15); ERCP (2015); Knee arthroscopy (Left); skin biopsy; Colonoscopy; Colonoscopy (3/16/16); Colonoscopy (N/A, 2018); eye surgery (Bilateral); Cardiac defibrillator placement (); pacemaker placement (); Carpal tunnel release (Right, 2019); Abdomen surgery; Atrial ablation surgery; Arm Surgery (Left, 10/20/2020); Ovary removal; and Colonoscopy (N/A, 9/15/2021).    Assessment   Assessment: Pt present with LLE weakness d/t tear of the left hamstring, increased pain, and impaired functional mobility. Pt requires Deng for bed mobility, minAx2 for STS with RW. Futher mobility unable to be assessed d/t hypotension. Pt would benefit from skilled PT service to improve these deficits.  Treatment Diagnosis: impaired functional mobility  Specific Instructions 
[facility]   INPATIENT OCCUPATIONAL THERAPY  PROGRESS NOTE  Date: 2024  Patient Name: Mara Cortés       Room:   MRN: [id number]    : 1955  (68 y.o.)  Gender: female   Referring Practitioner: Hayley York MD  Diagnosis: Hamstring tendon rupture, left      Discharge Recommendations:  Further Occupational Therapy is recommended upon facility discharge.    OT Equipment Recommendations  Other: TBD    Restrictions/Precautions  Restrictions/Precautions  Restrictions/Precautions: General Precautions;Contact Precautions;Other (comment);Up as Tolerated (Per Dr. Hart Protected weightbearing left lower extremity with a walker. up with assistance)  Required Braces or Orthoses?: No  Implants present? : Pacemaker    Comment: BP monitored throughout session. Semi-supine: 132/53. EOB: 114/67. Standin/75. Seated in bedside chair after SPT: 122/59.    Subjective  Subjective  Subjective: Pt agreeable to OT/PT co-tx with PTA Osvaldo.  Subjective  Pain: reports pain 7/10 in lower back.  Comments: OK for OT/PT co-tx per EVAN Lindsey.    Objective  Orientation  Overall Orientation Status: Within Functional Limits  Cognition  Overall Cognitive Status: WNL    Activities of Daily Living  ADL  Feeding: Modified independent , Based on clinical judgement  Grooming: Setup, Based on clinical judgement  UE Bathing: Minimal assistance, Based on clinical judgement  LE Bathing: Maximum assistance, Based on clinical judgement  UE Dressing: Minimal assistance, Based on clinical judgement  LE Dressing: Maximum assistance, Based on clinical judgement  LE Dressing Skilled Clinical Factors: Pt TD for donning/doffing footwear while seated EOB. TD for mgmt of brief around hips.  Toileting: Dependent/Total  Toileting Skilled Clinical Factors: purewick/brief  Functional Mobility: Unable to assess (Comment)  Functional Mobility Skilled Clinical Factors: Unsafe to attempt due to pain level and 
Transport changed due to patient being unable to ambulate to wheelchair and needing stretcher, Transport arranged for [facility] via [facility] at 2300.   
Provider Last Rate Last Admin    ketorolac (TORADOL) injection 15 mg  15 mg IntraVENous Q6H PRN Hayley York MD   15 mg at 04/22/24 0531    tiZANidine (ZANAFLEX) tablet 4 mg  4 mg Oral Q8H PRN Hayley York MD   4 mg at 04/22/24 2033    albuterol (PROVENTIL) (2.5 MG/3ML) 0.083% nebulizer solution 2.5 mg  2.5 mg Nebulization Q6H PRN Hayley York MD        aspirin tablet 325 mg  325 mg Oral Every Other Day Hayley York MD   325 mg at 04/22/24 0959    pantoprazole (PROTONIX) tablet 40 mg  40 mg Oral QAM AC Hayley York MD   40 mg at 04/22/24 0635    albuterol sulfate HFA (PROVENTIL;VENTOLIN;PROAIR) 108 (90 Base) MCG/ACT inhaler 2 puff  2 puff Inhalation Q6H PRN Hayley York MD        sotalol (BETAPACE) tablet 80 mg  80 mg Oral BID Hayley York MD   80 mg at 04/22/24 2044    budesonide-formoterol (SYMBICORT) 160-4.5 MCG/ACT inhaler 2 puff  2 puff Inhalation BID RT Hayley York MD   2 puff at 04/22/24 1855    empagliflozin (JARDIANCE) tablet 25 mg  25 mg Oral Daily Hayley York MD        glipiZIDE (GLUCOTROL) tablet 10 mg  10 mg Oral BID AC Hayley York MD   10 mg at 04/22/24 0635    amLODIPine (NORVASC) tablet 5 mg  5 mg Oral Daily Hayley York MD        allopurinol (ZYLOPRIM) tablet 300 mg  300 mg Oral Daily Hayley York MD   300 mg at 04/22/24 0959    Tirzepatide (MOUNJARO) SC injection 2.5 mg  2.5 mg SubCUTAneous Weekly Hayley York MD        rosuvastatin (CRESTOR) tablet 5 mg  5 mg Oral Nightly Hayley York MD   5 mg at 04/22/24 2044    cetirizine (ZYRTEC) tablet 10 mg  10 mg Oral Daily Hayley York MD   10 mg at 04/22/24 0959    fluticasone (FLONASE) 50 MCG/ACT nasal spray 2 spray  2 spray Each Nostril Daily Hayley York MD   2 spray at 04/22/24 1009    sodium chloride flush 0.9 % injection 5-40 mL  5-40 mL IntraVENous 2 times per day Hayley York MD        sodium chloride flush 0.9 % injection 10 mL  10 mL IntraVENous PRN Jaylen,

## 2024-10-08 ENCOUNTER — OFFICE VISIT (OUTPATIENT)
Dept: PODIATRY | Age: 69
End: 2024-10-08
Payer: MEDICARE

## 2024-10-08 VITALS — HEIGHT: 62 IN | WEIGHT: 243 LBS | BODY MASS INDEX: 44.72 KG/M2

## 2024-10-08 DIAGNOSIS — E11.42 DIABETIC POLYNEUROPATHY ASSOCIATED WITH TYPE 2 DIABETES MELLITUS (HCC): ICD-10-CM

## 2024-10-08 DIAGNOSIS — M20.41 HAMMER TOES OF BOTH FEET: ICD-10-CM

## 2024-10-08 DIAGNOSIS — M20.42 HAMMER TOES OF BOTH FEET: ICD-10-CM

## 2024-10-08 DIAGNOSIS — L84 CORNS AND CALLOSITIES: ICD-10-CM

## 2024-10-08 DIAGNOSIS — M79.672 PAIN IN LEFT FOOT: ICD-10-CM

## 2024-10-08 DIAGNOSIS — M79.674 PAIN OF TOES OF BOTH FEET: ICD-10-CM

## 2024-10-08 DIAGNOSIS — M79.675 PAIN OF TOES OF BOTH FEET: ICD-10-CM

## 2024-10-08 DIAGNOSIS — B35.1 ONYCHOMYCOSIS OF TOENAIL: Primary | ICD-10-CM

## 2024-10-08 DIAGNOSIS — M79.671 PAIN IN RIGHT FOOT: ICD-10-CM

## 2024-10-08 PROCEDURE — G8484 FLU IMMUNIZE NO ADMIN: HCPCS | Performed by: PODIATRIST

## 2024-10-08 PROCEDURE — 99203 OFFICE O/P NEW LOW 30 MIN: CPT | Performed by: PODIATRIST

## 2024-10-08 PROCEDURE — 11057 PARNG/CUTG B9 HYPRKR LES >4: CPT | Performed by: PODIATRIST

## 2024-10-08 PROCEDURE — G8399 PT W/DXA RESULTS DOCUMENT: HCPCS | Performed by: PODIATRIST

## 2024-10-08 PROCEDURE — 3051F HG A1C>EQUAL 7.0%<8.0%: CPT | Performed by: PODIATRIST

## 2024-10-08 PROCEDURE — 1090F PRES/ABSN URINE INCON ASSESS: CPT | Performed by: PODIATRIST

## 2024-10-08 PROCEDURE — 1036F TOBACCO NON-USER: CPT | Performed by: PODIATRIST

## 2024-10-08 PROCEDURE — 1123F ACP DISCUSS/DSCN MKR DOCD: CPT | Performed by: PODIATRIST

## 2024-10-08 PROCEDURE — 2022F DILAT RTA XM EVC RTNOPTHY: CPT | Performed by: PODIATRIST

## 2024-10-08 PROCEDURE — 3017F COLORECTAL CA SCREEN DOC REV: CPT | Performed by: PODIATRIST

## 2024-10-08 PROCEDURE — G8428 CUR MEDS NOT DOCUMENT: HCPCS | Performed by: PODIATRIST

## 2024-10-08 PROCEDURE — G8417 CALC BMI ABV UP PARAM F/U: HCPCS | Performed by: PODIATRIST

## 2024-10-08 ASSESSMENT — ENCOUNTER SYMPTOMS
NAUSEA: 0
SHORTNESS OF BREATH: 0
DIARRHEA: 0
BACK PAIN: 0
COLOR CHANGE: 0

## 2024-10-08 NOTE — PROGRESS NOTES
to web spaces 1-4, Bilateral.     There are no preulcerative lesions noted to the right foot.     There are no preulcerative lesions noted to the left foot.     The skin to the bilateral feet is not thin and shiny.     The skin to the bilateral feet is  warm, supple, and dry.      Vascular: DP pulse of the right foot is  palpable.     DP pulse of the left foot is  palpable.     PT pulse of the right foot is  palpable.     PT pulse of the left foot is  palpable.     CFT is less than 3 secs to the digits of the right foot.     CFT is less than 3 secs to the digits of the left foot.     There is no edema noted to the bilateral foot or ankle.     There is hair growth noted to the digits of the bilateral feet.     There are no varicosities noted to the right foot/ankle.     There are no varicosities noted to the left foot/ankle.     Erythema is absent to the bilateral feet.    Neurological: Achilles tendon reflex is diminished bilaterally.    Protective sensation is absent to the right plantar foot as noted with a 5.07 [place]-Shubham monofilament.     Protective sensation is absent to the left plantar foot as noted with a 5.07 [place]-Shubham monofilament.    Vibratory sensation is absent to the bilateral feet when compared to the wrists.    Musculoskeletal:  Muscle strength is +5/5 to all four muscle groups of the right lower extremity and +5/5 to all four muscle groups of the left lower extremity.     There are no areas of subluxation, dislocation, or laxity noted to either lower extremity.     Range of motion to the right ankle is  free of pain or grinding.     Range of motion to the left ankle is  free of pain or grinding.     Range of motion to the right subtalar joint is  free of pain or grinding.     Range of motion to the left subtalar joint is  free of pain or grinding.     No abnormalities, asymmetries, or misalignments are seen between the extremities.    Weightbearing evaluation does reveal rearfoot

## 2024-12-01 ENCOUNTER — HOSPITAL ENCOUNTER (EMERGENCY)
Age: 69
Discharge: HOME | End: 2024-12-01
Attending: EMERGENCY MEDICINE
Payer: MEDICARE

## 2024-12-01 ENCOUNTER — APPOINTMENT (OUTPATIENT)
Dept: CT IMAGING | Age: 69
End: 2024-12-01
Payer: MEDICARE

## 2024-12-01 ENCOUNTER — APPOINTMENT (OUTPATIENT)
Dept: GENERAL RADIOLOGY | Age: 69
End: 2024-12-01
Payer: MEDICARE

## 2024-12-01 VITALS
DIASTOLIC BLOOD PRESSURE: 75 MMHG | SYSTOLIC BLOOD PRESSURE: 115 MMHG | WEIGHT: 245 LBS | TEMPERATURE: 97.7 F | OXYGEN SATURATION: 95 % | BODY MASS INDEX: 45.08 KG/M2 | RESPIRATION RATE: 16 BRPM | HEIGHT: 62 IN | HEART RATE: 86 BPM

## 2024-12-01 DIAGNOSIS — R07.89 CHEST WALL PAIN: Primary | ICD-10-CM

## 2024-12-01 LAB
ANION GAP SERPL CALCULATED.3IONS-SCNC: 11 MMOL/L (ref 9–16)
BASOPHILS # BLD: 0 K/UL (ref 0–0.2)
BASOPHILS NFR BLD: 1 % (ref 0–2)
BUN SERPL-MCNC: 16 MG/DL (ref 8–23)
CALCIUM SERPL-MCNC: 10 MG/DL (ref 8.6–10.4)
CHLORIDE SERPL-SCNC: 100 MMOL/L (ref 98–107)
CO2 SERPL-SCNC: 27 MMOL/L (ref 20–31)
CREAT SERPL-MCNC: 0.8 MG/DL (ref 0.7–1.2)
D DIMER PPP FEU-MCNC: 0.58 UG/ML FEU (ref 0–0.59)
EOSINOPHIL # BLD: 0.1 K/UL (ref 0–0.4)
EOSINOPHILS RELATIVE PERCENT: 1 % (ref 0–4)
ERYTHROCYTE [DISTWIDTH] IN BLOOD BY AUTOMATED COUNT: 15.9 % (ref 11.5–14.9)
GFR, ESTIMATED: 79 ML/MIN/1.73M2
GLUCOSE SERPL-MCNC: 128 MG/DL (ref 74–99)
HCT VFR BLD AUTO: 48 % (ref 36–46)
HGB BLD-MCNC: 15.9 G/DL (ref 12–16)
INR PPP: 1.1
LYMPHOCYTES NFR BLD: 2.2 K/UL (ref 1–4.8)
LYMPHOCYTES RELATIVE PERCENT: 25 % (ref 24–44)
MCH RBC QN AUTO: 29.5 PG (ref 26–34)
MCHC RBC AUTO-ENTMCNC: 33.2 G/DL (ref 31–37)
MCV RBC AUTO: 89.1 FL (ref 80–100)
MONOCYTES NFR BLD: 0.5 K/UL (ref 0.1–1.3)
MONOCYTES NFR BLD: 5 % (ref 1–7)
MYOGLOBIN SERPL-MCNC: 48 NG/ML (ref 25–58)
MYOGLOBIN SERPL-MCNC: 52 NG/ML (ref 25–58)
NEUTROPHILS NFR BLD: 68 % (ref 36–66)
NEUTS SEG NFR BLD: 6 K/UL (ref 1.3–9.1)
PARTIAL THROMBOPLASTIN TIME: 26.6 SEC (ref 24–36)
PLATELET # BLD AUTO: 114 K/UL (ref 150–450)
PMV BLD AUTO: 9.8 FL (ref 6–12)
POTASSIUM SERPL-SCNC: 4.2 MMOL/L (ref 3.7–5.3)
PROTHROMBIN TIME: 14.5 SEC (ref 11.8–14.6)
RBC # BLD AUTO: 5.39 M/UL (ref 4–5.2)
SODIUM SERPL-SCNC: 138 MMOL/L (ref 136–145)
T4 FREE SERPL-MCNC: 1.1 NG/DL (ref 0.9–1.7)
TROPONIN I SERPL HS-MCNC: 10 NG/L (ref 0–14)
TROPONIN I SERPL HS-MCNC: 13 NG/L (ref 0–14)
TSH SERPL DL<=0.05 MIU/L-ACNC: 2.78 UIU/ML (ref 0.27–4.2)
WBC OTHER # BLD: 8.8 K/UL (ref 3.5–11)

## 2024-12-01 PROCEDURE — 85610 PROTHROMBIN TIME: CPT

## 2024-12-01 PROCEDURE — 6360000004 HC RX CONTRAST MEDICATION: Performed by: EMERGENCY MEDICINE

## 2024-12-01 PROCEDURE — 6370000000 HC RX 637 (ALT 250 FOR IP): Performed by: EMERGENCY MEDICINE

## 2024-12-01 PROCEDURE — 85025 COMPLETE CBC W/AUTO DIFF WBC: CPT

## 2024-12-01 PROCEDURE — 2580000003 HC RX 258: Performed by: EMERGENCY MEDICINE

## 2024-12-01 PROCEDURE — 6360000002 HC RX W HCPCS: Performed by: EMERGENCY MEDICINE

## 2024-12-01 PROCEDURE — 83874 ASSAY OF MYOGLOBIN: CPT

## 2024-12-01 PROCEDURE — 84443 ASSAY THYROID STIM HORMONE: CPT

## 2024-12-01 PROCEDURE — 93005 ELECTROCARDIOGRAM TRACING: CPT | Performed by: EMERGENCY MEDICINE

## 2024-12-01 PROCEDURE — 84439 ASSAY OF FREE THYROXINE: CPT

## 2024-12-01 PROCEDURE — 99285 EMERGENCY DEPT VISIT HI MDM: CPT

## 2024-12-01 PROCEDURE — 71045 X-RAY EXAM CHEST 1 VIEW: CPT

## 2024-12-01 PROCEDURE — 85730 THROMBOPLASTIN TIME PARTIAL: CPT

## 2024-12-01 PROCEDURE — 96375 TX/PRO/DX INJ NEW DRUG ADDON: CPT

## 2024-12-01 PROCEDURE — 84484 ASSAY OF TROPONIN QUANT: CPT

## 2024-12-01 PROCEDURE — 71260 CT THORAX DX C+: CPT

## 2024-12-01 PROCEDURE — 85379 FIBRIN DEGRADATION QUANT: CPT

## 2024-12-01 PROCEDURE — 80048 BASIC METABOLIC PNL TOTAL CA: CPT

## 2024-12-01 PROCEDURE — 36415 COLL VENOUS BLD VENIPUNCTURE: CPT

## 2024-12-01 PROCEDURE — 96374 THER/PROPH/DIAG INJ IV PUSH: CPT

## 2024-12-01 RX ORDER — LIDOCAINE 50 MG/G
1 PATCH TOPICAL DAILY
Qty: 10 PATCH | Refills: 0 | Status: SHIPPED | OUTPATIENT
Start: 2024-12-01 | End: 2024-12-11

## 2024-12-01 RX ORDER — 0.9 % SODIUM CHLORIDE 0.9 %
100 INTRAVENOUS SOLUTION INTRAVENOUS ONCE
Status: COMPLETED | OUTPATIENT
Start: 2024-12-01 | End: 2024-12-01

## 2024-12-01 RX ORDER — NITROGLYCERIN 0.4 MG/1
0.4 TABLET SUBLINGUAL EVERY 5 MIN PRN
Status: DISCONTINUED | OUTPATIENT
Start: 2024-12-01 | End: 2024-12-01 | Stop reason: HOSPADM

## 2024-12-01 RX ORDER — ORPHENADRINE CITRATE 30 MG/ML
60 INJECTION INTRAMUSCULAR; INTRAVENOUS ONCE
Status: COMPLETED | OUTPATIENT
Start: 2024-12-01 | End: 2024-12-01

## 2024-12-01 RX ORDER — IBUPROFEN 600 MG/1
600 TABLET, FILM COATED ORAL EVERY 6 HOURS PRN
Qty: 28 TABLET | Refills: 0 | Status: SHIPPED | OUTPATIENT
Start: 2024-12-01 | End: 2024-12-08

## 2024-12-01 RX ORDER — LIDOCAINE 4 G/G
1 PATCH TOPICAL ONCE
Status: DISCONTINUED | OUTPATIENT
Start: 2024-12-01 | End: 2024-12-01 | Stop reason: HOSPADM

## 2024-12-01 RX ORDER — SODIUM CHLORIDE 0.9 % (FLUSH) 0.9 %
10 SYRINGE (ML) INJECTION PRN
Status: DISCONTINUED | OUTPATIENT
Start: 2024-12-01 | End: 2024-12-01 | Stop reason: HOSPADM

## 2024-12-01 RX ORDER — MORPHINE SULFATE 4 MG/ML
4 INJECTION, SOLUTION INTRAMUSCULAR; INTRAVENOUS ONCE
Status: COMPLETED | OUTPATIENT
Start: 2024-12-01 | End: 2024-12-01

## 2024-12-01 RX ORDER — IOPAMIDOL 755 MG/ML
75 INJECTION, SOLUTION INTRAVASCULAR
Status: COMPLETED | OUTPATIENT
Start: 2024-12-01 | End: 2024-12-01

## 2024-12-01 RX ORDER — CYCLOBENZAPRINE HCL 10 MG
10 TABLET ORAL 3 TIMES DAILY PRN
Qty: 20 TABLET | Refills: 0 | Status: SHIPPED | OUTPATIENT
Start: 2024-12-01 | End: 2024-12-08

## 2024-12-01 RX ADMIN — ORPHENADRINE CITRATE 60 MG: 60 INJECTION INTRAMUSCULAR; INTRAVENOUS at 17:26

## 2024-12-01 RX ADMIN — SODIUM CHLORIDE, PRESERVATIVE FREE 10 ML: 5 INJECTION INTRAVENOUS at 17:01

## 2024-12-01 RX ADMIN — IOPAMIDOL 75 ML: 755 INJECTION, SOLUTION INTRAVENOUS at 17:01

## 2024-12-01 RX ADMIN — NITROGLYCERIN 0.4 MG: 0.4 TABLET SUBLINGUAL at 16:04

## 2024-12-01 RX ADMIN — MORPHINE SULFATE 4 MG: 4 INJECTION, SOLUTION INTRAMUSCULAR; INTRAVENOUS at 16:11

## 2024-12-01 RX ADMIN — SODIUM CHLORIDE 100 ML: 9 INJECTION, SOLUTION INTRAVENOUS at 17:02

## 2024-12-01 RX ADMIN — NITROGLYCERIN 0.4 MG: 0.4 TABLET SUBLINGUAL at 15:52

## 2024-12-01 ASSESSMENT — PAIN DESCRIPTION - ORIENTATION
ORIENTATION: MID
ORIENTATION: MID
ORIENTATION: RIGHT

## 2024-12-01 ASSESSMENT — PAIN DESCRIPTION - LOCATION
LOCATION: CHEST

## 2024-12-01 ASSESSMENT — PAIN SCALES - GENERAL
PAINLEVEL_OUTOF10: 8

## 2024-12-01 ASSESSMENT — PAIN - FUNCTIONAL ASSESSMENT: PAIN_FUNCTIONAL_ASSESSMENT: 0-10

## 2024-12-01 ASSESSMENT — PAIN DESCRIPTION - DESCRIPTORS
DESCRIPTORS: STABBING
DESCRIPTORS: STABBING

## 2024-12-01 ASSESSMENT — PAIN DESCRIPTION - PAIN TYPE: TYPE: ACUTE PAIN

## 2024-12-01 NOTE — ED PROVIDER NOTES
eMERGENCY dEPARTMENT eNCOUnter      Pt Name: Mara Cortés  MRN: [id number]  Birthdate 1955  Date of evaluation: 12/1/24      CHIEF COMPLAINT       Chief Complaint   Patient presents with    Chest Pain         HISTORY OF PRESENT ILLNESS    Mara Cortés is a 69 y.o. female who presents complaining of chest pain.  Patient states she had severe substernal chest pain that started around 10:00 this morning.  Patient states the pain is worse if she tries to lift her right arm.  Patient has no shortness of breath no cough no fevers no abdominal pain no vomiting.  Patient has no pain or swelling in the legs.  Patient does have heart disease had an ablation due to atrial fibrillation and has a defibrillator in place.      REVIEW OF SYSTEMS       Review of Systems   Constitutional:  Negative for activity change, appetite change, chills, diaphoresis and fever.   HENT:  Negative for congestion, ear pain, facial swelling, nosebleeds, rhinorrhea, sinus pressure, sore throat and trouble swallowing.    Eyes:  Negative for pain, discharge and redness.   Respiratory:  Negative for cough, chest tightness, shortness of breath and wheezing.    Cardiovascular:  Positive for chest pain. Negative for palpitations and leg swelling.   Gastrointestinal:  Negative for abdominal pain, blood in stool, constipation, diarrhea, nausea and vomiting.   Genitourinary:  Negative for difficulty urinating, dysuria, flank pain, frequency, genital sores and hematuria.   Musculoskeletal:  Negative for arthralgias, back pain, gait problem, joint swelling, myalgias and neck pain.   Skin:  Negative for color change, pallor, rash and wound.   Neurological:  Negative for dizziness, tremors, seizures, syncope, speech difficulty, weakness, numbness and headaches.   Psychiatric/Behavioral:  Negative for confusion, decreased concentration, hallucinations, self-injury, sleep disturbance and suicidal ideas.        PAST MEDICAL HISTORY     Past Medical History:

## 2024-12-01 NOTE — ED NOTES
Mode of arrival (squad #, walk in, police, etc) : [facility], from home         Arrival Note (brief scenario, treatment PTA, etc).: Patient reports around 1030 today, she got out of her chair, walked to her bathroom and developed mid sternal chest pain,shortness of breath shortly after the pain and had an episode of feeling lightheaded. She reports pain has gotten progressively worse throughout the day. Reports pain radiates to right shoulder and is worse when raising right arm.   EMS administered Aspirin 324 mg PO PTA.

## 2024-12-02 LAB
EKG ATRIAL RATE: 85 BPM
EKG ATRIAL RATE: 90 BPM
EKG P AXIS: 63 DEGREES
EKG P AXIS: 91 DEGREES
EKG P-R INTERVAL: 190 MS
EKG P-R INTERVAL: 212 MS
EKG Q-T INTERVAL: 418 MS
EKG Q-T INTERVAL: 452 MS
EKG QRS DURATION: 110 MS
EKG QRS DURATION: 114 MS
EKG QTC CALCULATION (BAZETT): 497 MS
EKG QTC CALCULATION (BAZETT): 537 MS
EKG R AXIS: -37 DEGREES
EKG R AXIS: -77 DEGREES
EKG T AXIS: 84 DEGREES
EKG T AXIS: 97 DEGREES
EKG VENTRICULAR RATE: 85 BPM
EKG VENTRICULAR RATE: 85 BPM

## 2024-12-02 PROCEDURE — 93010 ELECTROCARDIOGRAM REPORT: CPT | Performed by: INTERNAL MEDICINE

## 2024-12-05 LAB
EKG ATRIAL RATE: 90 BPM
EKG P AXIS: 29 DEGREES
EKG P-R INTERVAL: 178 MS
EKG Q-T INTERVAL: 422 MS
EKG QRS DURATION: 120 MS
EKG QTC CALCULATION (BAZETT): 516 MS
EKG R AXIS: -78 DEGREES
EKG T AXIS: 87 DEGREES
EKG VENTRICULAR RATE: 90 BPM

## 2024-12-05 PROCEDURE — 93010 ELECTROCARDIOGRAM REPORT: CPT | Performed by: INTERNAL MEDICINE

## 2024-12-13 ENCOUNTER — OFFICE VISIT (OUTPATIENT)
Dept: PODIATRY | Age: 69
End: 2024-12-13

## 2024-12-13 VITALS — BODY MASS INDEX: 46.01 KG/M2 | HEIGHT: 62 IN | WEIGHT: 250 LBS

## 2024-12-13 DIAGNOSIS — M79.671 PAIN IN RIGHT FOOT: ICD-10-CM

## 2024-12-13 DIAGNOSIS — M79.674 PAIN OF TOES OF BOTH FEET: ICD-10-CM

## 2024-12-13 DIAGNOSIS — M79.672 PAIN IN LEFT FOOT: ICD-10-CM

## 2024-12-13 DIAGNOSIS — E11.42 DIABETIC POLYNEUROPATHY ASSOCIATED WITH TYPE 2 DIABETES MELLITUS (HCC): ICD-10-CM

## 2024-12-13 DIAGNOSIS — L84 CORNS AND CALLOSITIES: ICD-10-CM

## 2024-12-13 DIAGNOSIS — M79.675 PAIN OF TOES OF BOTH FEET: ICD-10-CM

## 2024-12-13 DIAGNOSIS — B35.1 ONYCHOMYCOSIS OF TOENAIL: Primary | ICD-10-CM

## 2025-02-24 ENCOUNTER — TELEPHONE (OUTPATIENT)
Dept: GASTROENTEROLOGY | Age: 70
End: 2025-02-24

## 2025-03-06 NOTE — TELEPHONE ENCOUNTER
Office visit on 4-9 [place]  To address change in stool  Family hx of colon ca  Testing positive for gene   Last screen was 9/21

## (undated) DEVICE — BANDAGE,ELASTIC,ESMARK,STERILE,4"X9',LF: Brand: MEDLINE

## (undated) DEVICE — SUTURE VCRL + SZ 3-0 L27IN ABSRB UD L26MM SH 1/2 CIR VCP416H

## (undated) DEVICE — BNDG,ELSTC,MATRIX,STRL,2"X5YD,LF,HOOK&LP: Brand: MEDLINE

## (undated) DEVICE — STRIP,CLOSURE,WOUND,MEDI-STRIP,1/2X4: Brand: MEDLINE

## (undated) DEVICE — SUTURE ETHLN SZ 3-0 L18IN NONABSORBABLE BLK FS-1 L24MM 3/8 663H

## (undated) DEVICE — DEFENDO AIR WATER SUCTION AND BIOPSY VALVE KIT FOR  OLYMPUS: Brand: DEFENDO AIR/WATER/SUCTION AND BIOPSY VALVE

## (undated) DEVICE — YANKAUER,POOLE TIP,STERILE,50/CS: Brand: MEDLINE

## (undated) DEVICE — GOWN,AURORA,NONREINFORCED,LARGE: Brand: MEDLINE

## (undated) DEVICE — STRAP,POSITIONING,KNEE/BODY,FOAM,4X60": Brand: MEDLINE

## (undated) DEVICE — MERCY HEALTH ST CHARLES: Brand: MEDLINE INDUSTRIES, INC.

## (undated) DEVICE — TUBING, SUCTION, 3/16" X 10', STRAIGHT: Brand: MEDLINE

## (undated) DEVICE — GLOVE SURG SZ 8 L12IN FNGR THK13MIL BRN LTX SYN POLYMER W

## (undated) DEVICE — GOWN,SIRUS,NONRNF,SETINSLV,XL,20/CS: Brand: MEDLINE

## (undated) DEVICE — Z DUP USE 2257490 ADHESIVE SKIN CLSRE 036ML TPCL 2CTL CNCRLTE HIGH VSCSTY DRMB

## (undated) DEVICE — AIRLIFE™ NASAL OXYGEN CANNULA CURVED, FLARED TIP, WITH 7 FEET (2.1 M) CRUSH RESISTANT TUBING, OVER-THE-EAR STYLE: Brand: AIRLIFE™

## (undated) DEVICE — PENCIL ES L3M BTTN SWCH HOLSTER W/ BLDE ELECTRD EDGE

## (undated) DEVICE — SYRINGE IRRIG 60ML SFT PLIABLE BLB EZ TO GRP 1 HND USE W/

## (undated) DEVICE — SUTURE MCRYL + SZ 4-0 L27IN ABSRB UD L19MM PS-2 3/8 CIR MCP426H

## (undated) DEVICE — SNARE ENDOSCP L240CM LOOP W13MM DIA2.4MM SHT THROW SM OVL

## (undated) DEVICE — NEEDLE HYPO 25GA L1.5IN BLU POLYPR HUB S STL REG BVL STR

## (undated) DEVICE — ZIMMER® STERILE DISPOSABLE TOURNIQUET CUFF WITH PLC, DUAL PORT, DUAL BLADDER, 18 IN. (46 CM)

## (undated) DEVICE — ELECTRODE PT RET AD L9FT HI MOIST COND ADH HYDRGEL CORDED

## (undated) DEVICE — SOLUTION IV IRRIG POUR BRL 0.9% SODIUM CHL 2F7124

## (undated) DEVICE — DRESSING,GAUZE,XEROFORM,CURAD,1"X8",ST: Brand: CURAD

## (undated) DEVICE — GLOVE ORTHO 7 1/2   MSG9475

## (undated) DEVICE — Device

## (undated) DEVICE — ENDO KIT W/SYRINGE: Brand: MEDLINE INDUSTRIES, INC.

## (undated) DEVICE — DRESSING TRNSPAR W2XL2.75IN FLM SHT SEMIPERMEABLE WIND

## (undated) DEVICE — GLOVE SURG SZ 6 THK91MIL LTX FREE SYN POLYISOPRENE ANTI

## (undated) DEVICE — PADDING CAST W2INXL4YD COT LO LINTING WYTEX

## (undated) DEVICE — PAD,NON-ADHERENT,3X8,STERILE,LF,1/PK: Brand: MEDLINE

## (undated) DEVICE — DUP USE 393023 JELLY LUBRICATING EZ 2OZ

## (undated) DEVICE — SYRINGE 20ML LL S/C 50